# Patient Record
Sex: FEMALE | Race: WHITE | NOT HISPANIC OR LATINO
[De-identification: names, ages, dates, MRNs, and addresses within clinical notes are randomized per-mention and may not be internally consistent; named-entity substitution may affect disease eponyms.]

---

## 2018-10-02 ENCOUNTER — RX RENEWAL (OUTPATIENT)
Age: 83
End: 2018-10-02

## 2018-11-05 ENCOUNTER — RX RENEWAL (OUTPATIENT)
Age: 83
End: 2018-11-05

## 2018-11-12 ENCOUNTER — OTHER (OUTPATIENT)
Age: 83
End: 2018-11-12

## 2018-11-27 ENCOUNTER — RECORD ABSTRACTING (OUTPATIENT)
Age: 83
End: 2018-11-27

## 2018-11-27 ENCOUNTER — APPOINTMENT (OUTPATIENT)
Dept: ENDOCRINOLOGY | Facility: CLINIC | Age: 83
End: 2018-11-27
Payer: MEDICARE

## 2018-11-27 VITALS
DIASTOLIC BLOOD PRESSURE: 74 MMHG | OXYGEN SATURATION: 96 % | HEIGHT: 62 IN | HEART RATE: 79 BPM | BODY MASS INDEX: 36.8 KG/M2 | SYSTOLIC BLOOD PRESSURE: 120 MMHG | WEIGHT: 200 LBS

## 2018-11-27 DIAGNOSIS — Z78.9 OTHER SPECIFIED HEALTH STATUS: ICD-10-CM

## 2018-11-27 DIAGNOSIS — Z87.39 PERSONAL HISTORY OF OTHER DISEASES OF THE MUSCULOSKELETAL SYSTEM AND CONNECTIVE TISSUE: ICD-10-CM

## 2018-11-27 DIAGNOSIS — Z86.79 PERSONAL HISTORY OF OTHER DISEASES OF THE CIRCULATORY SYSTEM: ICD-10-CM

## 2018-11-27 LAB — GLUCOSE BLDC GLUCOMTR-MCNC: 288

## 2018-11-27 PROCEDURE — 99214 OFFICE O/P EST MOD 30 MIN: CPT | Mod: 25

## 2018-11-27 PROCEDURE — 82962 GLUCOSE BLOOD TEST: CPT

## 2018-11-27 RX ORDER — CLOPIDOGREL BISULFATE 75 MG/1
75 TABLET, FILM COATED ORAL
Refills: 0 | Status: ACTIVE | COMMUNITY

## 2018-12-04 ENCOUNTER — MEDICATION RENEWAL (OUTPATIENT)
Age: 83
End: 2018-12-04

## 2019-01-07 ENCOUNTER — OTHER (OUTPATIENT)
Age: 84
End: 2019-01-07

## 2019-01-14 ENCOUNTER — APPOINTMENT (OUTPATIENT)
Dept: ENDOCRINOLOGY | Facility: CLINIC | Age: 84
End: 2019-01-14
Payer: MEDICARE

## 2019-01-14 ENCOUNTER — MEDICATION RENEWAL (OUTPATIENT)
Age: 84
End: 2019-01-14

## 2019-01-14 VITALS — SYSTOLIC BLOOD PRESSURE: 100 MMHG | HEART RATE: 85 BPM | DIASTOLIC BLOOD PRESSURE: 60 MMHG

## 2019-01-14 PROCEDURE — 95251 CONT GLUC MNTR ANALYSIS I&R: CPT

## 2019-01-14 PROCEDURE — 99215 OFFICE O/P EST HI 40 MIN: CPT | Mod: 25

## 2019-01-14 RX ORDER — METOPROLOL TARTRATE 25 MG/1
25 TABLET, FILM COATED ORAL
Refills: 0 | Status: DISCONTINUED | COMMUNITY
End: 2019-01-14

## 2019-01-14 RX ORDER — MIRTAZAPINE 15 MG/1
15 TABLET, FILM COATED ORAL
Refills: 0 | Status: DISCONTINUED | COMMUNITY
End: 2019-01-14

## 2019-01-14 RX ORDER — LEVOTHYROXINE SODIUM 0.05 MG/1
50 TABLET ORAL DAILY
Qty: 90 | Refills: 0 | Status: DISCONTINUED | COMMUNITY
Start: 2018-12-04 | End: 2019-01-14

## 2019-01-14 RX ORDER — ASPIRIN 81 MG
81 TABLET, DELAYED RELEASE (ENTERIC COATED) ORAL
Refills: 0 | Status: ACTIVE | COMMUNITY

## 2019-01-14 RX ORDER — DONEPEZIL HYDROCHLORIDE 5 MG/1
5 TABLET ORAL
Refills: 0 | Status: DISCONTINUED | COMMUNITY
End: 2019-01-14

## 2019-01-14 RX ORDER — CALCITRIOL 0.25 UG/1
0.25 CAPSULE, LIQUID FILLED ORAL
Refills: 0 | Status: ACTIVE | COMMUNITY

## 2019-01-14 RX ORDER — PRAVASTATIN SODIUM 20 MG/1
20 TABLET ORAL
Refills: 0 | Status: DISCONTINUED | COMMUNITY
End: 2019-01-14

## 2019-01-14 RX ORDER — LEVOTHYROXINE SODIUM 0.07 MG/1
75 TABLET ORAL DAILY
Qty: 90 | Refills: 1 | Status: DISCONTINUED | COMMUNITY
Start: 2018-12-04 | End: 2019-01-14

## 2019-01-14 RX ORDER — FENOFIBRATE 48 MG/1
48 TABLET ORAL
Refills: 0 | Status: DISCONTINUED | COMMUNITY
End: 2019-01-14

## 2019-01-14 RX ORDER — OXYBUTYNIN CHLORIDE 10 MG/1
10 TABLET, EXTENDED RELEASE ORAL
Refills: 0 | Status: DISCONTINUED | COMMUNITY
End: 2019-01-14

## 2019-01-14 RX ORDER — ALLOPURINOL 100 MG/1
100 TABLET ORAL
Refills: 0 | Status: DISCONTINUED | COMMUNITY
End: 2019-01-14

## 2019-01-14 RX ORDER — HYDROXYZINE HYDROCHLORIDE 50 MG/1
50 TABLET ORAL
Refills: 0 | Status: ACTIVE | COMMUNITY

## 2019-01-22 ENCOUNTER — RECORD ABSTRACTING (OUTPATIENT)
Age: 84
End: 2019-01-22

## 2019-01-22 RX ORDER — METOPROLOL TARTRATE 25 MG/1
25 TABLET, FILM COATED ORAL
Refills: 0 | Status: ACTIVE | COMMUNITY

## 2019-01-25 ENCOUNTER — RX RENEWAL (OUTPATIENT)
Age: 84
End: 2019-01-25

## 2019-02-01 ENCOUNTER — RX RENEWAL (OUTPATIENT)
Age: 84
End: 2019-02-01

## 2019-02-04 ENCOUNTER — APPOINTMENT (OUTPATIENT)
Dept: ENDOCRINOLOGY | Facility: CLINIC | Age: 84
End: 2019-02-04
Payer: MEDICARE

## 2019-02-04 PROCEDURE — 97803 MED NUTRITION INDIV SUBSEQ: CPT

## 2019-02-20 ENCOUNTER — APPOINTMENT (OUTPATIENT)
Dept: ENDOCRINOLOGY | Facility: CLINIC | Age: 84
End: 2019-02-20
Payer: MEDICARE

## 2019-02-20 DIAGNOSIS — G30.9 ALZHEIMER'S DISEASE, UNSPECIFIED: ICD-10-CM

## 2019-02-20 DIAGNOSIS — F02.80 ALZHEIMER'S DISEASE, UNSPECIFIED: ICD-10-CM

## 2019-02-20 PROCEDURE — 36415 COLL VENOUS BLD VENIPUNCTURE: CPT

## 2019-02-26 ENCOUNTER — APPOINTMENT (OUTPATIENT)
Dept: ENDOCRINOLOGY | Facility: CLINIC | Age: 84
End: 2019-02-26

## 2019-03-01 LAB
ALBUMIN SERPL ELPH-MCNC: 4 G/DL
ALP BLD-CCNC: 63 U/L
ALT SERPL-CCNC: 13 U/L
ANION GAP SERPL CALC-SCNC: 17 MMOL/L
AST SERPL-CCNC: 10 U/L
BASOPHILS # BLD AUTO: 0.06 K/UL
BASOPHILS NFR BLD AUTO: 0.8 %
BILIRUB SERPL-MCNC: 0.3 MG/DL
BUN SERPL-MCNC: 74 MG/DL
CALCIUM SERPL-MCNC: 10.2 MG/DL
CHLORIDE SERPL-SCNC: 102 MMOL/L
CHOLEST SERPL-MCNC: 153 MG/DL
CHOLEST/HDLC SERPL: 4.1 RATIO
CO2 SERPL-SCNC: 26 MMOL/L
CREAT SERPL-MCNC: 2.43 MG/DL
EOSINOPHIL # BLD AUTO: 0.35 K/UL
EOSINOPHIL NFR BLD AUTO: 4.5 %
GLUCOSE SERPL-MCNC: 112 MG/DL
HBA1C MFR BLD HPLC: 10 %
HCT VFR BLD CALC: 43 %
HDLC SERPL-MCNC: 37 MG/DL
HGB BLD-MCNC: 13.3 G/DL
IMM GRANULOCYTES NFR BLD AUTO: 0.5 %
LDLC SERPL CALC-MCNC: 83 MG/DL
LYMPHOCYTES # BLD AUTO: 1.74 K/UL
LYMPHOCYTES NFR BLD AUTO: 22.5 %
MAN DIFF?: NORMAL
MCHC RBC-ENTMCNC: 29.7 PG
MCHC RBC-ENTMCNC: 30.9 GM/DL
MCV RBC AUTO: 96 FL
MONOCYTES # BLD AUTO: 0.85 K/UL
MONOCYTES NFR BLD AUTO: 11 %
NEUTROPHILS # BLD AUTO: 4.7 K/UL
NEUTROPHILS NFR BLD AUTO: 60.7 %
PLATELET # BLD AUTO: 256 K/UL
POTASSIUM SERPL-SCNC: 5.4 MMOL/L
PROT SERPL-MCNC: 6.1 G/DL
RBC # BLD: 4.48 M/UL
RBC # FLD: 12.3 %
SODIUM SERPL-SCNC: 145 MMOL/L
TRIGL SERPL-MCNC: 164 MG/DL
TSH SERPL-ACNC: 3.84 UIU/ML
WBC # FLD AUTO: 7.74 K/UL

## 2019-03-14 ENCOUNTER — APPOINTMENT (OUTPATIENT)
Dept: ENDOCRINOLOGY | Facility: CLINIC | Age: 84
End: 2019-03-14
Payer: MEDICARE

## 2019-03-14 ENCOUNTER — LABORATORY RESULT (OUTPATIENT)
Age: 84
End: 2019-03-14

## 2019-03-14 PROCEDURE — 36415 COLL VENOUS BLD VENIPUNCTURE: CPT

## 2019-03-19 ENCOUNTER — RECORD ABSTRACTING (OUTPATIENT)
Age: 84
End: 2019-03-19

## 2019-03-20 ENCOUNTER — APPOINTMENT (OUTPATIENT)
Dept: ENDOCRINOLOGY | Facility: CLINIC | Age: 84
End: 2019-03-20
Payer: MEDICARE

## 2019-03-20 PROCEDURE — 97803 MED NUTRITION INDIV SUBSEQ: CPT

## 2019-04-08 ENCOUNTER — RX RENEWAL (OUTPATIENT)
Age: 84
End: 2019-04-08

## 2019-04-15 ENCOUNTER — MEDICATION RENEWAL (OUTPATIENT)
Age: 84
End: 2019-04-15

## 2019-04-15 RX ORDER — FLASH GLUCOSE SENSOR
KIT MISCELLANEOUS
Qty: 9 | Refills: 2 | Status: DISCONTINUED | COMMUNITY
Start: 2018-11-05 | End: 2019-04-15

## 2019-04-16 RX ORDER — FLASH GLUCOSE SENSOR
KIT MISCELLANEOUS
Qty: 1 | Refills: 0 | Status: ACTIVE | COMMUNITY
Start: 2019-02-04 | End: 1900-01-01

## 2019-04-17 ENCOUNTER — APPOINTMENT (OUTPATIENT)
Dept: ENDOCRINOLOGY | Facility: CLINIC | Age: 84
End: 2019-04-17
Payer: MEDICARE

## 2019-04-17 VITALS
WEIGHT: 200 LBS | DIASTOLIC BLOOD PRESSURE: 60 MMHG | BODY MASS INDEX: 36.8 KG/M2 | HEIGHT: 62 IN | HEART RATE: 61 BPM | SYSTOLIC BLOOD PRESSURE: 108 MMHG

## 2019-04-17 LAB — GLUCOSE BLDC GLUCOMTR-MCNC: 301

## 2019-04-17 PROCEDURE — 99214 OFFICE O/P EST MOD 30 MIN: CPT | Mod: 25

## 2019-04-17 PROCEDURE — 82962 GLUCOSE BLOOD TEST: CPT

## 2019-04-17 PROCEDURE — 95251 CONT GLUC MNTR ANALYSIS I&R: CPT

## 2019-04-17 RX ORDER — FLASH GLUCOSE SENSOR
KIT MISCELLANEOUS
Qty: 6 | Refills: 1 | Status: ACTIVE | COMMUNITY
Start: 2019-02-04 | End: 1900-01-01

## 2019-04-17 NOTE — PHYSICAL EXAM
[Alert] : alert [No Acute Distress] : no acute distress [Well Nourished] : well nourished [Well Developed] : well developed [Normal Sclera/Conjunctiva] : normal sclera/conjunctiva [EOMI] : extra ocular movement intact [Normal Rate and Effort] : normal respiratory rhythm and effort [Normal Rate] : heart rate was normal  [Clear to Auscultation] : lungs were clear to auscultation bilaterally [Normal S1, S2] : normal S1 and S2 [Normal Bowel Sounds] : normal bowel sounds [Not Tender] : non-tender [Soft] : abdomen soft [Not Distended] : not distended [Normal Reflexes] : deep tendon reflexes were 2+ and symmetric [No Tremors] : no tremors [Oriented x3] : oriented to person, place, and time [Normal Insight/Judgement] : insight and judgment were intact [Normal Affect] : the affect was normal [Normal Mood] : the mood was normal

## 2019-04-17 NOTE — DATA REVIEWED
[FreeTextEntry1] : Labs 11/20/18\par A1c 9.9\par TSH 3.64 FT4 1.38\par Cr 2.14\par Tg 194\par LDL chol 99\par \par Labs 1/2/19\par Cr 2.28, GFR 19\par B12 470\par LDL chol 96. HDL 37, Tg 168\par A1c 10.4%\par TSH 4.550\par 25 vit D 40.2\par \par \par \par

## 2019-04-17 NOTE — HISTORY OF PRESENT ILLNESS
[FreeTextEntry1] : T2DM since 1990's on insulin since 2008\par aide with pt, daughter moved out to [place], another son helps out around the house and helps with medications \par no longer going to day center b/c she has an aide\par \par Current DM/thyroid meds:\par pt self injects insulin\par Basaglar 46 units in morning\par \par Novolog Breakfast\par 151-200 14 units\par 201-250 16\par 251-300 18\par 301-350 20\par 351-400 22\par 401+ 24 units\par \par Novolog Lunch\par 151-200 10 units\par 201-250 14\par 251-300 16\par 301-350 18\par 351-400 20\par 401+ 22 units\par \par Novolog Dinner\par 151-200 14 units\par 201-250 16\par 251-300 18\par 301-350 20\par 351-400 22\par 401+ 24 units \par \par adherent with thyroid meds\par does not have med list with her\par \par SMBG: using Hamlet sensor\par CGMS downloaded and reviewed: Hamlet\par Average glucose: 208\par % HIGH: 69\par % TARGET: 31\par % LOW: 0\par \par Interpretation: persistant hyperglycemia, worse after meals\par \par Diet: aids cooks diabetic, good appetite\par 9 am breakfast - farina, 1-2 pm lunch, 5-6 pm dinner\par \par Exercise: none\par \par Complications:\par CKD - sees Dr Acosta\par no recent eye exam \par

## 2019-04-17 NOTE — REVIEW OF SYSTEMS
[Nocturia] : nocturia [Polyuria] : polyuria [Insomnia] : insomnia [Stress] : stress [Fatigue] : no fatigue [Recent Weight Gain (___ Lbs)] : no recent weight gain [Recent Weight Loss (___ Lbs)] : no recent weight loss [Blurry Vision] : no blurred vision [Visual Field Defect] : no visual field defect [Chest Pain] : no chest pain [Palpitations] : no palpitations [Shortness Of Breath] : no shortness of breath [Nausea] : no nausea [SOB on Exertion] : no shortness of breath during exertion [Constipation] : no constipation [Vomiting] : no vomiting was observed [Diarrhea] : no diarrhea [Abdominal Pain] : no abdominal pain [Pain/Numbness of Digits] : no pain/numbness of digits [Depression] : no depression [Anxiety] : no anxiety [Polydipsia] : no polydipsia

## 2019-07-18 ENCOUNTER — APPOINTMENT (OUTPATIENT)
Dept: ENDOCRINOLOGY | Facility: CLINIC | Age: 84
End: 2019-07-18
Payer: MEDICARE

## 2019-07-18 DIAGNOSIS — Z00.00 ENCOUNTER FOR GENERAL ADULT MEDICAL EXAMINATION W/OUT ABNORMAL FINDINGS: ICD-10-CM

## 2019-07-18 PROCEDURE — 36415 COLL VENOUS BLD VENIPUNCTURE: CPT

## 2019-07-24 LAB
ALBUMIN SERPL ELPH-MCNC: 4.3 G/DL
ALP BLD-CCNC: 51 U/L
ALT SERPL-CCNC: 14 U/L
ANION GAP SERPL CALC-SCNC: 14 MMOL/L
AST SERPL-CCNC: 15 U/L
BASOPHILS # BLD AUTO: 0.06 K/UL
BASOPHILS NFR BLD AUTO: 0.8 %
BILIRUB SERPL-MCNC: 0.3 MG/DL
BUN SERPL-MCNC: 73 MG/DL
CALCIUM SERPL-MCNC: 10.3 MG/DL
CHLORIDE SERPL-SCNC: 101 MMOL/L
CHOLEST SERPL-MCNC: 180 MG/DL
CHOLEST/HDLC SERPL: 5.3 RATIO
CO2 SERPL-SCNC: 30 MMOL/L
CREAT SERPL-MCNC: 2.55 MG/DL
EOSINOPHIL # BLD AUTO: 0.34 K/UL
EOSINOPHIL NFR BLD AUTO: 4.7 %
ESTIMATED AVERAGE GLUCOSE: 183 MG/DL
GLUCOSE SERPL-MCNC: 116 MG/DL
HBA1C MFR BLD HPLC: 8 %
HCT VFR BLD CALC: 46.3 %
HDLC SERPL-MCNC: 34 MG/DL
HGB BLD-MCNC: 14.2 G/DL
IMM GRANULOCYTES NFR BLD AUTO: 0.3 %
LDLC SERPL CALC-MCNC: 95 MG/DL
LYMPHOCYTES # BLD AUTO: 1.62 K/UL
LYMPHOCYTES NFR BLD AUTO: 22.2 %
MAN DIFF?: NORMAL
MCHC RBC-ENTMCNC: 28 PG
MCHC RBC-ENTMCNC: 30.7 GM/DL
MCV RBC AUTO: 91.1 FL
MONOCYTES # BLD AUTO: 0.78 K/UL
MONOCYTES NFR BLD AUTO: 10.7 %
NEUTROPHILS # BLD AUTO: 4.49 K/UL
NEUTROPHILS NFR BLD AUTO: 61.3 %
PLATELET # BLD AUTO: 306 K/UL
POTASSIUM SERPL-SCNC: 4.6 MMOL/L
PROT SERPL-MCNC: 6.7 G/DL
RBC # BLD: 5.08 M/UL
RBC # FLD: 13.3 %
SODIUM SERPL-SCNC: 145 MMOL/L
T4 FREE SERPL-MCNC: 1.3 NG/DL
TRIGL SERPL-MCNC: 253 MG/DL
TSH SERPL-ACNC: 7.11 UIU/ML
WBC # FLD AUTO: 7.31 K/UL

## 2019-07-30 ENCOUNTER — APPOINTMENT (OUTPATIENT)
Dept: ENDOCRINOLOGY | Facility: CLINIC | Age: 84
End: 2019-07-30
Payer: MEDICARE

## 2019-07-30 VITALS — DIASTOLIC BLOOD PRESSURE: 58 MMHG | HEART RATE: 71 BPM | HEIGHT: 62 IN | SYSTOLIC BLOOD PRESSURE: 110 MMHG

## 2019-07-30 LAB — GLUCOSE BLDC GLUCOMTR-MCNC: 186

## 2019-07-30 PROCEDURE — 82962 GLUCOSE BLOOD TEST: CPT

## 2019-07-30 PROCEDURE — 95251 CONT GLUC MNTR ANALYSIS I&R: CPT

## 2019-07-30 PROCEDURE — 99215 OFFICE O/P EST HI 40 MIN: CPT | Mod: 25

## 2019-08-13 ENCOUNTER — MEDICATION RENEWAL (OUTPATIENT)
Age: 84
End: 2019-08-13

## 2019-08-13 RX ORDER — METOPROLOL SUCCINATE 25 MG/1
25 TABLET, EXTENDED RELEASE ORAL
Refills: 0 | Status: DISCONTINUED | COMMUNITY
End: 2019-08-13

## 2019-08-13 RX ORDER — LEVOCETIRIZINE DIHYDROCHLORIDE 5 MG/1
5 TABLET ORAL
Refills: 0 | Status: DISCONTINUED | COMMUNITY
End: 2019-08-13

## 2019-08-13 RX ORDER — FENOFIBRATE 48 MG/1
48 TABLET ORAL
Refills: 0 | Status: ACTIVE | COMMUNITY

## 2019-08-13 RX ORDER — OMEGA-3/DHA/EPA/FISH OIL 300-1000MG
1000 CAPSULE ORAL
Refills: 0 | Status: ACTIVE | COMMUNITY
Start: 2019-08-13

## 2019-08-13 RX ORDER — PANTOPRAZOLE SODIUM 40 MG/1
40 GRANULE, DELAYED RELEASE ORAL
Refills: 0 | Status: DISCONTINUED | COMMUNITY
End: 2019-08-13

## 2019-08-13 RX ORDER — PRAVASTATIN SODIUM 20 MG/1
20 TABLET ORAL
Refills: 0 | Status: ACTIVE | COMMUNITY

## 2019-08-13 RX ORDER — L.ACID/L.CASEI/B.BIF/B.LON/FOS 2B CELL-50
CAPSULE ORAL
Refills: 0 | Status: ACTIVE | COMMUNITY
Start: 2019-08-13

## 2019-08-13 RX ORDER — PRAVASTATIN SODIUM 20 MG/1
20 TABLET ORAL
Refills: 0 | Status: DISCONTINUED | COMMUNITY
End: 2019-08-13

## 2019-08-13 RX ORDER — OXYBUTYNIN CHLORIDE 10 MG/1
10 TABLET, EXTENDED RELEASE ORAL
Refills: 0 | Status: DISCONTINUED | COMMUNITY
End: 2019-08-13

## 2019-08-13 RX ORDER — PSYLLIUM HUSK 0.52 G/1
0.52 CAPSULE ORAL
Refills: 0 | Status: ACTIVE | COMMUNITY
Start: 2019-08-13

## 2019-08-13 RX ORDER — ALLOPURINOL 100 MG/1
100 TABLET ORAL
Refills: 0 | Status: DISCONTINUED | COMMUNITY
End: 2019-08-13

## 2019-08-13 RX ORDER — SERTRALINE 25 MG/1
25 TABLET, FILM COATED ORAL
Refills: 0 | Status: ACTIVE | COMMUNITY
Start: 2019-08-13

## 2019-08-13 RX ORDER — PANTOPRAZOLE 40 MG/1
40 TABLET, DELAYED RELEASE ORAL
Refills: 0 | Status: ACTIVE | COMMUNITY
Start: 2019-08-13

## 2019-08-13 RX ORDER — CHOLECALCIFEROL (VITAMIN D3) 25 MCG
25 TABLET ORAL
Refills: 0 | Status: ACTIVE | COMMUNITY
Start: 2019-08-13

## 2019-08-13 RX ORDER — DONEPEZIL HYDROCHLORIDE 5 MG/1
5 TABLET ORAL
Refills: 0 | Status: DISCONTINUED | COMMUNITY
End: 2019-08-13

## 2019-08-13 RX ORDER — INSULIN GLARGINE 100 [IU]/ML
100 INJECTION, SOLUTION SUBCUTANEOUS
Refills: 0 | Status: DISCONTINUED | COMMUNITY
End: 2019-08-13

## 2019-09-16 ENCOUNTER — RX RENEWAL (OUTPATIENT)
Age: 84
End: 2019-09-16

## 2019-10-02 ENCOUNTER — RX RENEWAL (OUTPATIENT)
Age: 84
End: 2019-10-02

## 2019-10-18 LAB
HBA1C MFR BLD HPLC: 7.9
LDLC SERPL DIRECT ASSAY-MCNC: 79.9

## 2019-10-21 ENCOUNTER — APPOINTMENT (OUTPATIENT)
Dept: ENDOCRINOLOGY | Facility: CLINIC | Age: 84
End: 2019-10-21
Payer: MEDICARE

## 2019-10-21 VITALS
BODY MASS INDEX: 36.8 KG/M2 | HEART RATE: 64 BPM | SYSTOLIC BLOOD PRESSURE: 130 MMHG | WEIGHT: 200 LBS | DIASTOLIC BLOOD PRESSURE: 80 MMHG | OXYGEN SATURATION: 95 % | HEIGHT: 62 IN

## 2019-10-21 LAB — GLUCOSE BLDC GLUCOMTR-MCNC: 175

## 2019-10-21 PROCEDURE — 82962 GLUCOSE BLOOD TEST: CPT

## 2019-10-21 PROCEDURE — 95251 CONT GLUC MNTR ANALYSIS I&R: CPT

## 2019-10-21 PROCEDURE — 99214 OFFICE O/P EST MOD 30 MIN: CPT | Mod: 25

## 2019-10-21 NOTE — HISTORY OF PRESENT ILLNESS
[FreeTextEntry1] : Quality: Type 2 DM\par Severity: moderate \par Duration: since 1990s and on insulin since 2008 \par Onset: routine labs\par Modifying Factors: Better with insulin \par Associated Symptoms: neuropathy \par \par Current Regimen:\par Novolog before meals \par 120-200 14 units\par 201-250 16 units\par 301-350 20 units\par 351-400 22 units \par > 400 24 units \par Basaglar 45 units at HS\par \par - Levothyroxine 125 mcg daily - taking appropriately \par \par \par Self blood sugar monitoring: Hamlet Personal Sensor - average glucose 157, SD 54.6\par % high: 36\par % Target: 60\par % Low: 4 \par - some overnight lows \par \par exercise: physical therapy twice a walk, ambulates with walker \par \par Diet:\par B- cream of wheat \par L- burger\par D- varies \par Snacks - cake, canned fruit \par \par Date of last eye exam: 9/2019 (-) diabetic retinopathy\par Date of last foot exam: last week with podiatry \par Date of last flu vaccine: 2018\par Date of last Pneumovax: 2018

## 2019-10-21 NOTE — ASSESSMENT
[FreeTextEntry1] : 84 y/o female with Type 2 DM, HTN, HLD, and Hypothyroidism. Labs reviewed from 10/10/19 - , A1C 7.9%, BUN 71, cholesterol 168, LDL 79, TSH 2.3, and Free T4 1.32\par \par Plan: \par Type 2 DM: controlled - A1C at goal\par - decrease Basaglar 40 units at HS \par - continue Novolog scale \par - continue Hamlet sensor \par - repeat A1C in 3 months\par \par BUN elevated - repeat CMP\par - follow up with Nephrology \par \par HLD: controlled with statin and Fenofibrate \par \par Hypothyroidism: euthyroid clinically and biomedically \par - continue Levothyroxine 125 mcg daily\par - repeat TFTs in 3 months \par \par HTN: acceptable, continue BB and Lasix\par  \par Labs and follow up in 3 months.

## 2019-10-21 NOTE — PHYSICAL EXAM
[Alert] : alert [No Acute Distress] : no acute distress [Well Nourished] : well nourished [Normal Sclera/Conjunctiva] : normal sclera/conjunctiva [Well Developed] : well developed [EOMI] : extra ocular movement intact [Supple] : the neck was supple [No LAD] : no lymphadenopathy [Thyroid Not Enlarged] : the thyroid was not enlarged [Normal Rate and Effort] : normal respiratory rhythm and effort [No Accessory Muscle Use] : no accessory muscle use [Clear to Auscultation] : lungs were clear to auscultation bilaterally [Normal Rate] : heart rate was normal  [Normal S1, S2] : normal S1 and S2 [No Tremors] : no tremors [Regular Rhythm] : with a regular rhythm [Oriented x3] : oriented to person, place, and time [Normal Insight/Judgement] : insight and judgment were intact [de-identified] : Pt. examined in wheelchair  [Normal Mood] : the mood was normal [de-identified] : Pt. refused foot examination

## 2019-10-21 NOTE — REASON FOR VISIT
[Follow-Up: _____] : a [unfilled] follow-up visit [Other: _____] : [unfilled] [FreeTextEntry1] : Type 2 DM, HTN, HLD, and Hypothyroidism

## 2019-10-21 NOTE — REVIEW OF SYSTEMS
[Recent Weight Loss (___ Lbs)] : recent [unfilled] ~Ulb weight loss [Constipation] : constipation [Polyuria] : polyuria [Polydipsia] : polydipsia [Heat Intolerance] : heat intolerant [Fatigue] : no fatigue [Decreased Appetite] : appetite not decreased [Recent Weight Gain (___ Lbs)] : no recent weight gain [Visual Field Defect] : no visual field defect [Blurry Vision] : no blurred vision [Dysphagia] : no dysphagia [Dysphonia] : no dysphonia [Neck Pain] : no neck pain [Chest Pain] : no chest pain [Palpitations] : no palpitations [Diarrhea] : no diarrhea [Dysuria] : no dysuria [Headache] : no headaches [Tremors] : no tremors [Depression] : no depression [Anxiety] : no anxiety [Cold Intolerance] : cold tolerant [Easy Bruising] : no tendency for easy bruising [Swelling] : no swelling

## 2019-10-22 ENCOUNTER — RESULT REVIEW (OUTPATIENT)
Age: 84
End: 2019-10-22

## 2019-10-22 LAB
ALBUMIN SERPL ELPH-MCNC: 4.4 G/DL
ALP BLD-CCNC: 56 U/L
ALT SERPL-CCNC: 16 U/L
ANION GAP SERPL CALC-SCNC: 17 MMOL/L
AST SERPL-CCNC: 17 U/L
BILIRUB SERPL-MCNC: 0.2 MG/DL
BUN SERPL-MCNC: 87 MG/DL
CALCIUM SERPL-MCNC: 10.2 MG/DL
CHLORIDE SERPL-SCNC: 99 MMOL/L
CO2 SERPL-SCNC: 27 MMOL/L
CREAT SERPL-MCNC: 3.12 MG/DL
GLUCOSE SERPL-MCNC: 167 MG/DL
POTASSIUM SERPL-SCNC: 5.2 MMOL/L
PROT SERPL-MCNC: 6.4 G/DL
SODIUM SERPL-SCNC: 143 MMOL/L

## 2019-10-23 ENCOUNTER — RX RENEWAL (OUTPATIENT)
Age: 84
End: 2019-10-23

## 2019-11-01 ENCOUNTER — RX RENEWAL (OUTPATIENT)
Age: 84
End: 2019-11-01

## 2019-11-01 RX ORDER — LEVOTHYROXINE SODIUM 0.12 MG/1
125 TABLET ORAL
Qty: 90 | Refills: 1 | Status: ACTIVE | COMMUNITY
Start: 2019-11-01 | End: 1900-01-01

## 2019-12-02 ENCOUNTER — APPOINTMENT (OUTPATIENT)
Dept: ENDOCRINOLOGY | Facility: CLINIC | Age: 84
End: 2019-12-02

## 2019-12-04 ENCOUNTER — RX RENEWAL (OUTPATIENT)
Age: 84
End: 2019-12-04

## 2020-01-21 ENCOUNTER — APPOINTMENT (OUTPATIENT)
Dept: ENDOCRINOLOGY | Facility: CLINIC | Age: 85
End: 2020-01-21
Payer: MEDICARE

## 2020-01-21 VITALS
DIASTOLIC BLOOD PRESSURE: 74 MMHG | WEIGHT: 200 LBS | BODY MASS INDEX: 36.8 KG/M2 | HEART RATE: 64 BPM | HEIGHT: 62 IN | SYSTOLIC BLOOD PRESSURE: 124 MMHG

## 2020-01-21 LAB — GLUCOSE BLDC GLUCOMTR-MCNC: 146

## 2020-01-21 PROCEDURE — 99214 OFFICE O/P EST MOD 30 MIN: CPT | Mod: 25

## 2020-01-21 PROCEDURE — 95251 CONT GLUC MNTR ANALYSIS I&R: CPT

## 2020-01-21 PROCEDURE — 82962 GLUCOSE BLOOD TEST: CPT

## 2020-01-21 NOTE — REVIEW OF SYSTEMS
[Constipation] : constipation [Polyuria] : polyuria [Polydipsia] : polydipsia [Heat Intolerance] : heat intolerant [Incontinence] : incontinence [Pain/Numbness of Digits] : pain/numbness of digits [Fatigue] : no fatigue [Decreased Appetite] : appetite not decreased [Recent Weight Gain (___ Lbs)] : no recent weight gain [Recent Weight Loss (___ Lbs)] : no recent weight loss [Visual Field Defect] : no visual field defect [Blurry Vision] : no blurred vision [Dysphagia] : no dysphagia [Dysphonia] : no dysphonia [Neck Pain] : no neck pain [Chest Pain] : no chest pain [Palpitations] : no palpitations [Nausea] : no nausea [Vomiting] : no vomiting was observed [Diarrhea] : no diarrhea [Abdominal Pain] : no abdominal pain [Nocturia] : no nocturia [Headache] : no headaches [Tremors] : no tremors [Depression] : no depression [Anxiety] : no anxiety [Cold Intolerance] : cold tolerant [Easy Bruising] : no tendency for easy bruising [Swelling] : no swelling

## 2020-01-21 NOTE — PHYSICAL EXAM
[Alert] : alert [No Acute Distress] : no acute distress [Well Nourished] : well nourished [Normal Sclera/Conjunctiva] : normal sclera/conjunctiva [Well Developed] : well developed [EOMI] : extra ocular movement intact [Supple] : the neck was supple [No LAD] : no lymphadenopathy [Thyroid Not Enlarged] : the thyroid was not enlarged [Normal Rate and Effort] : normal respiratory rhythm and effort [Clear to Auscultation] : lungs were clear to auscultation bilaterally [Normal Rate] : heart rate was normal  [Normal S1, S2] : normal S1 and S2 [Regular Rhythm] : with a regular rhythm [No Tremors] : no tremors [Oriented x3] : oriented to person, place, and time [Normal Insight/Judgement] : insight and judgment were intact [Normal Mood] : the mood was normal [Foot Ulcers] : no foot ulcers [de-identified] : Pt. examined in wheelchair  [de-identified] : bilateral 2+ pitting edema up to shins [de-identified] : unable to palpate pedal pulses due to edema

## 2020-01-21 NOTE — REASON FOR VISIT
[Follow-Up: _____] : a [unfilled] follow-up visit [Other: _____] : [unfilled] [FreeTextEntry1] : worsening Type 2 DM, stable HyperTg, HLD, and Hypothyroidism

## 2020-01-21 NOTE — DATA REVIEWED
[FreeTextEntry1] : Labs 1/15/20\par urine microalb/Cr 72\par B12 429\par Cr 2.36, GFR 20\par A1c 8.5, Gluc 181\par FT4 0.93. TSH 1.850\par LDL 74\par Vit D 44.3

## 2020-01-21 NOTE — ASSESSMENT
[FreeTextEntry1] : Type 2 DM complicated by CKD - worsening control\par - pt to call office with insulin doses\par - Hamlet DL reviewed - good control overnight but post meal hyperglycemia - will need more prandial insulin once I have proper insulin doses\par - continue Hamlet sensor \par - repeat A1C in 3 months\par  \par HLD: controlled with statin \par HyperTg controlled with Fenofibrate \par Hypothyroidism: clinically and biomedically euthyroid\par - continue Levothyroxine 125 mcg daily\par 2+ pitting edema - pt advised to f/u PCP\par - repeat TFTs in 3 months \par \par

## 2020-01-21 NOTE — HISTORY OF PRESENT ILLNESS
[FreeTextEntry1] : recent URI s/p Abx\par \par Quality: Type 2 DM\par Severity: moderate \par Duration: since 1990s and on insulin since 2008 \par Onset: routine labs\par Modifying Factors: Better with insulin \par Associated Symptoms: neuropathy, ?recent eye exam. (+) CKD elevated Cr - following with Dr Acosta\par \par Current Regimen: pt is not sure of scale\par Novolog before meals \par 120-200 14 units\par 201-250 16 units\par 301-350 20 units\par 351-400 22 units \par > 400 24 units \par Basaglar ?40 or 45 units at HS\par \par Exercise: physical therapy twice a walk, ambulates with walker \par Diet:\par B- cream of wheat \par L- burger\par D- varies \par Snacks - cake, canned fruit \par \par SMBG - Hamlet\par CGM downloaded and reviewed\par Average glucose:168\par SD: 49\par % HIGH:39\par % TARGET:59\par % LOW:2\par \par Interpretation: overnight numbers okay, post BF and lunch and dinner hyperglycemia\par \par \par

## 2020-03-03 ENCOUNTER — APPOINTMENT (OUTPATIENT)
Dept: ENDOCRINOLOGY | Facility: CLINIC | Age: 85
End: 2020-03-03
Payer: MEDICARE

## 2020-03-03 PROCEDURE — 97803 MED NUTRITION INDIV SUBSEQ: CPT

## 2020-03-20 ENCOUNTER — RX RENEWAL (OUTPATIENT)
Age: 85
End: 2020-03-20

## 2020-03-25 ENCOUNTER — RX RENEWAL (OUTPATIENT)
Age: 85
End: 2020-03-25

## 2020-04-13 ENCOUNTER — NON-APPOINTMENT (OUTPATIENT)
Age: 85
End: 2020-04-13

## 2020-04-14 ENCOUNTER — APPOINTMENT (OUTPATIENT)
Dept: ENDOCRINOLOGY | Facility: CLINIC | Age: 85
End: 2020-04-14
Payer: MEDICARE

## 2020-04-14 PROCEDURE — 98967 PH1 ASSMT&MGMT NQHP 11-20: CPT | Mod: CR

## 2020-05-12 ENCOUNTER — APPOINTMENT (OUTPATIENT)
Dept: ENDOCRINOLOGY | Facility: CLINIC | Age: 85
End: 2020-05-12
Payer: MEDICARE

## 2020-05-12 PROCEDURE — 99442: CPT | Mod: CR

## 2020-05-12 NOTE — HISTORY OF PRESENT ILLNESS
[FreeTextEntry1] : Interval Hx: no issues\par \par Quality: Type 2 DM\par Severity: moderate \par Duration: since 1990s and on insulin since 2008 \par Onset: routine labs\par Modifying Factors: Better with insulin \par Associated Symptoms: neuropathy, ?recent eye exam. (+) CKD elevated Cr - following with Dr Acosta\par \par Current Regimen: pt is not sure of scale\par Novolog before meals \par 120 - 200 16 units\par 201 - 250 18 units\par 251 - 300 20 units\par 300 - 350 22 units\par 351- 402 24 units\par Over 401 26 units\par Basaglar 25 units HS\par \par Exercise: physical therapy twice a walk, ambulates with walker \par Diet: eats 3 meals daily\par \par SMBG - Hamlet, per pt FS <200, scanning 4x daily, occasional FS 59 at 2 pm 1x/week\par ===================================\par Hypothyroid on LT4 125 mcg daily; adherent. denies anterior neck pain, dysphagia or voice changes\par ===================================\par HyperTg on Feofibrate\par =================================\par HLD - on pravastatin 20 mg nightly\par \par \par

## 2020-05-12 NOTE — ASSESSMENT
[FreeTextEntry1] : Type 2 DM complicated by CKD - stable control by history, c/o afternoon lows at times\par - cont Basaglar 25 units\par cont Novolog scale : w Bf and Dinner, suggest -2 units for Lunch\par 120 - 200 16 units\par 201 - 250 18 units\par 251 - 300 20 units\par 300 - 350 22 units\par 351- 402 24 units\par Over 401 26 units\par - continue Hamlet sensor \par - repeat A1C in 3 months\par  \par HLD: controlled with statin\par  \par HyperTg : controlled with Fenofibrate\par  \par Hypothyroidism: clinically and biomedically euthyroid\par - continue Levothyroxine 125 mcg daily\par \par repeat labs 3 months\par \par \par

## 2020-05-12 NOTE — DATA REVIEWED
[FreeTextEntry1] : Labs 1/15/20\par urine microalb/Cr 72\par B12 429\par Cr 2.36, GFR 20\par A1c 8.5, Gluc 181\par FT4 0.93. TSH 1.850\par LDL 74\par Vit D 44.3\par \par Labs 5/6/20\par Gluc 185\par Cr 2.54, GFR 18\par A1c 7.1\par TSH 1.700\par LDL 81

## 2020-05-12 NOTE — REVIEW OF SYSTEMS
[Recent Weight Gain (___ Lbs)] : recent weight gain: [unfilled] lbs [Recent Weight Loss (___ Lbs)] : no recent weight loss [Blurred Vision] : no blurred vision [As Noted in HPI] : as noted in HPI [Chest Pain] : no chest pain [Shortness Of Breath] : no shortness of breath [Palpitations] : no palpitations [SOB on Exertion] : no shortness of breath on exertion [Constipation] : no constipation [Polyuria] : polyuria [Diarrhea] : no diarrhea [Abdominal Pain] : no abdominal pain [Nocturia] : no nocturia [Muscle Weakness] : muscle weakness [Pain/Numbness of Digits] : pain/numbness of digits [Polydipsia] : polydipsia

## 2020-05-12 NOTE — REASON FOR VISIT
[Verbal consent obtained from patient] : the patient, [unfilled] [Follow - Up] : a follow-up visit [Hypothyroidism] : hypothyroidism [DM Type 2] : DM Type 2 [Other___] : [unfilled]

## 2020-06-17 ENCOUNTER — RX RENEWAL (OUTPATIENT)
Age: 85
End: 2020-06-17

## 2020-12-18 ENCOUNTER — RX RENEWAL (OUTPATIENT)
Age: 85
End: 2020-12-18

## 2020-12-31 PROBLEM — G30.9 ALZHEIMER'S DEMENTIA: Status: ACTIVE | Noted: 2018-11-27

## 2021-03-01 ENCOUNTER — RX RENEWAL (OUTPATIENT)
Age: 86
End: 2021-03-01

## 2021-04-13 ENCOUNTER — APPOINTMENT (OUTPATIENT)
Dept: ENDOCRINOLOGY | Facility: CLINIC | Age: 86
End: 2021-04-13
Payer: MEDICARE

## 2021-04-13 ENCOUNTER — RESULT CHARGE (OUTPATIENT)
Age: 86
End: 2021-04-13

## 2021-04-13 VITALS
OXYGEN SATURATION: 99 % | BODY MASS INDEX: 36.8 KG/M2 | WEIGHT: 200 LBS | HEART RATE: 69 BPM | HEIGHT: 62 IN | DIASTOLIC BLOOD PRESSURE: 70 MMHG | SYSTOLIC BLOOD PRESSURE: 110 MMHG

## 2021-04-13 LAB — GLUCOSE BLDC GLUCOMTR-MCNC: 197

## 2021-04-13 PROCEDURE — 95251 CONT GLUC MNTR ANALYSIS I&R: CPT

## 2021-04-13 PROCEDURE — 99214 OFFICE O/P EST MOD 30 MIN: CPT | Mod: 25

## 2021-04-13 PROCEDURE — 82962 GLUCOSE BLOOD TEST: CPT

## 2021-04-13 NOTE — HISTORY OF PRESENT ILLNESS
[FreeTextEntry1] : Interval Hx: sacral pressure ulcer is healing, still seeing wound care and has visiting nurse 3x per week.\par \par Quality: Type 2 DM\par Severity: moderate \par Duration: since  and on insulin since  \par Onset: routine labs\par Modifying Factors: Better with insulin \par Associated Symptoms: \par kidney: (+) CKD elevated Cr - following with Dr Acosta, has appointment coming up\par eye exam scheduled for , left eye getting worse. unsure of history of diabetic retinopathy.\par feet: +neuropathy\par heart: no MI, stent, or CVA\par \par Current Regimen: pt is not sure of scale\par Novolog before meals, -2 units at lunch, dose set up by aide and then patient injects\par 120 - 200 16 units\par 201 - 250 18 units\par 251 - 300 20 units\par 300 - 350 22 units\par 351- 402 24 units\par Over 401 26 units\par Basaglar 25 units HS, given by her children\par \par Exercise: ambulates with walker, goes up and down the tanner 2-3x daily\par Diet: eats 3 meals daily\par weight: stable\par \par SMBG with Freestyle Hamlet, reviewed CGMS. Avg , CV 25.5% with 78% of values in range and 22% high. None low. \par BG spikes significantly after dinner. Mild spike after breakfast.\par Current B, berries with whipped cream, hard boiled egg, and coffee with sugar for breakfast 2.5 hours ago.\par ===================================\par Hypothyroid on LT4 125 mcg daily; takes appropriately\par Denies anterior neck pain, dysphagia or voice changes\par ===================================\par HyperTg on Feofibrate\par =================================\par HLD - on pravastatin 20 mg nightly\par

## 2021-04-13 NOTE — PHYSICAL EXAM
[Alert] : alert [Well Nourished] : well nourished [Obese] : obese [No Acute Distress] : no acute distress [Well Developed] : well developed [Normal Sclera/Conjunctiva] : normal sclera/conjunctiva [EOMI] : extra ocular movement intact [No Proptosis] : no proptosis [Thyroid Not Enlarged] : the thyroid was not enlarged [No Thyroid Nodules] : no palpable thyroid nodules [No Respiratory Distress] : no respiratory distress [No Accessory Muscle Use] : no accessory muscle use [Clear to Auscultation] : lungs were clear to auscultation bilaterally [Normal S1, S2] : normal S1 and S2 [Normal Rate] : heart rate was normal [Regular Rhythm] : with a regular rhythm [Normal Anterior Cervical Nodes] : no anterior cervical lymphadenopathy [Normal Posterior Cervical Nodes] : no posterior cervical lymphadenopathy [No Rash] : no rash [Oriented x3] : oriented to person, place, and time [Normal Affect] : the affect was normal [Normal Mood] : the mood was normal [Acanthosis Nigricans] : no acanthosis nigricans [de-identified] : B/L LE edema with venous stasis changes [de-identified] : in a wheelchair

## 2021-04-13 NOTE — REVIEW OF SYSTEMS
[Blurred Vision] : blurred vision [SOB on Exertion] : shortness of breath on exertion [Constipation] : constipation [Polyuria] : polyuria [Depression] : depression [Dysphagia] : no dysphagia [Neck Pain] : no neck pain [Dysphonia] : no dysphonia [Chest Pain] : no chest pain [Palpitations] : no palpitations [Shortness Of Breath] : no shortness of breath [Nausea] : no nausea [Abdominal Pain] : no abdominal pain [Vomiting] : no vomiting [Diarrhea] : no diarrhea [Tremors] : no tremors [Pain/Numbness of Digits] : no pain/numbness of digits [Anxiety] : no anxiety [Polydipsia] : no polydipsia

## 2021-04-13 NOTE — ASSESSMENT
[FreeTextEntry1] : 86 year old female with T2DM and associated CKD, also with hypothyroidism, hyperlipidemia/hypertriglyceridemia, and hypertension. Glycemic control is acceptable.\par \par 1. T2DM with associated CKD- A1c 7.2%, not a candidate for tight control given age and risk of hypoglycemia. \par -Continue Basaglar 25 units daily.\par -Continue Novolog sliding scale but increase by 2 units at dinner. Written instructions provided.\par -Watch carbohydrate intake at breakfast and dinner.\par -Continue SMBG with Hamlet.\par -Repeat A1c before next office visit in 2 months.\par -Glucose sensor necessity: This patient with diabetes performs four or more glucose checks per day utilizing a home blood glucose monitor. The patient is treated with insulin via three or more injections daily. This patient requires frequent adjustments to their insulin treatment on the basis of therapeutic continuous glucose monitoring results. In addition, the patient has been to our office for an evaluation of their diabetes control within the past six months. \par \par  2. Hyperlipidemia- LDL acceptable.\par -Continue statin and fenofibrate.\par -Low fat, low carb diet.\par \par 3. Hypothyroidism\par -Continue current dose of LT4.\par -Check TFTs with next labs.\par \par

## 2021-06-01 ENCOUNTER — APPOINTMENT (OUTPATIENT)
Dept: ENDOCRINOLOGY | Facility: CLINIC | Age: 86
End: 2021-06-01

## 2021-06-01 ENCOUNTER — RX RENEWAL (OUTPATIENT)
Age: 86
End: 2021-06-01

## 2021-06-22 ENCOUNTER — APPOINTMENT (OUTPATIENT)
Dept: ENDOCRINOLOGY | Facility: CLINIC | Age: 86
End: 2021-06-22
Payer: MEDICARE

## 2021-06-22 VITALS
OXYGEN SATURATION: 95 % | SYSTOLIC BLOOD PRESSURE: 110 MMHG | HEIGHT: 62 IN | WEIGHT: 203 LBS | DIASTOLIC BLOOD PRESSURE: 70 MMHG | HEART RATE: 82 BPM | BODY MASS INDEX: 37.36 KG/M2

## 2021-06-22 LAB — GLUCOSE BLDC GLUCOMTR-MCNC: 109

## 2021-06-22 PROCEDURE — 99214 OFFICE O/P EST MOD 30 MIN: CPT | Mod: 25

## 2021-06-22 PROCEDURE — 82962 GLUCOSE BLOOD TEST: CPT

## 2021-06-22 RX ORDER — FEBUXOSTAT 80 MG/1
80 TABLET ORAL
Refills: 0 | Status: DISCONTINUED | COMMUNITY
End: 2021-06-22

## 2021-06-22 RX ORDER — DOCUSATE SODIUM 100 MG/1
CAPSULE ORAL
Refills: 0 | Status: ACTIVE | COMMUNITY

## 2021-06-22 RX ORDER — FUROSEMIDE 40 MG/1
40 TABLET ORAL
Refills: 0 | Status: DISCONTINUED | COMMUNITY
End: 2021-06-22

## 2021-06-22 RX ORDER — FUROSEMIDE 20 MG/1
20 TABLET ORAL
Refills: 0 | Status: DISCONTINUED | COMMUNITY
Start: 2019-08-13 | End: 2021-06-22

## 2021-06-22 NOTE — PHYSICAL EXAM
[Alert] : alert [Well Nourished] : well nourished [Obese] : obese [No Acute Distress] : no acute distress [Well Developed] : well developed [Normal Sclera/Conjunctiva] : normal sclera/conjunctiva [EOMI] : extra ocular movement intact [No Proptosis] : no proptosis [Thyroid Not Enlarged] : the thyroid was not enlarged [No Thyroid Nodules] : no palpable thyroid nodules [No Respiratory Distress] : no respiratory distress [No Accessory Muscle Use] : no accessory muscle use [Clear to Auscultation] : lungs were clear to auscultation bilaterally [Normal S1, S2] : normal S1 and S2 [Normal Rate] : heart rate was normal [Regular Rhythm] : with a regular rhythm [Normal Anterior Cervical Nodes] : no anterior cervical lymphadenopathy [No Rash] : no rash [Oriented x3] : oriented to person, place, and time [Normal Affect] : the affect was normal [Normal Mood] : the mood was normal [Acanthosis Nigricans] : no acanthosis nigricans [de-identified] : B/L LE edema with venous stasis changes [de-identified] : in a wheelchair [de-identified] : Le shin ulcerations - no signs of infection

## 2021-06-22 NOTE — HISTORY OF PRESENT ILLNESS
[FreeTextEntry1] : Interval Hx: recently hospitalized for sacral decubitus ulcer, following w wound care and VNS comes every 3 days to change dressing\par \par Quality: Type 2 DM\par Severity: moderate \par Duration: since 1990s and on insulin since 2008 \par Onset: routine labs\par Modifying Factors: Better with insulin \par Associated Symptoms: \par kidney: (+) CKD elevated Cr - following with Dr Acosta, has appointment coming up\par unsure of history of diabetic retinopathy.\par feet: +neuropathy\par \par Current Regimen: \par Novolog before meals, -2 units at lunch, dose set up by aide and then patient injects\par 120 - 200 16 units\par 201 - 250 18 units\par 251 - 300 20 units\par 300 - 350 22 units\par 351- 402 24 units\par Over 401 26 units\par Basaglar 25 units HS, given by her children\par \par Exercise: ambulates with walker, goes up and down the tanner 2-3x daily\par Diet: eats 3 meals daily\par weight: stable\par \par SMBG: using Hamlet, per son FS okay <200's, increased 60-80's\par ===================================\par Hypothyroid on LT4 125 mcg daily; takes appropriately. Denies anterior neck pain, dysphagia or voice changes\par ===================================\par HyperTg on Fenofibrate 28 mg\par =================================\par HLD - on pravastatin 20 mg nightly\par

## 2021-06-22 NOTE — ASSESSMENT
[FreeTextEntry1] : 86 year old female with T2DM and associated CKD, also with hypothyroidism, hyperlipidemia/hypertriglyceridemia, and hypertension. \par \par 1. T2DM with associated CKD - son reports incresaed lows lately but no Hamlet today at visit\par -  not a candidate for tight control given age, decreased mobility and risk of hypoglycemia,  asked son to RTO w Hamlet reader for DL\par -Continue Basaglar 25 units daily.\par -Continue current Novolog insulin dosing\par -Continue SMBG with Hamlet,\par -Repeat A1c needed\par \par -Glucose sensor necessity: This patient with diabetes performs four or more glucose checks per day utilizing a home blood glucose monitor. The patient is treated with insulin via three or more injections daily. This patient requires frequent adjustments to their insulin treatment on the basis of therapeutic continuous glucose monitoring results. In addition, the patient has been to our office for an evaluation of their diabetes control within the past six months. \par \par  2. Hyperlipidemia\par -Continue statin and fenofibrate.\par -Low fat, low carb diet.\par \par 3. Hypothyroidism\par -Continue current dose of LT4.\par -Check TFTs\par \par - try and get labs from [facility] \par \par  [Insulin Self-Administration] : insulin self-administration [Injection Technique, Storage, Sharps Disposal] : injection technique, storage, and sharps disposal [FreeTextEntry2] : air shot prior to injections, rotate injection site, hold button for several seconds after full depression

## 2021-06-22 NOTE — REASON FOR VISIT
[Follow - Up] : a follow-up visit [DM Type 2] : DM Type 2 [Hypothyroidism] : hypothyroidism [Family Member] : family member

## 2021-06-25 ENCOUNTER — NON-APPOINTMENT (OUTPATIENT)
Age: 86
End: 2021-06-25

## 2021-08-28 ENCOUNTER — RX RENEWAL (OUTPATIENT)
Age: 86
End: 2021-08-28

## 2021-09-24 LAB
HBA1C MFR BLD HPLC: 6.1
LDLC SERPL DIRECT ASSAY-MCNC: 72
MICROALBUMIN/CREAT 24H UR-RTO: 203

## 2021-09-28 ENCOUNTER — APPOINTMENT (OUTPATIENT)
Dept: ENDOCRINOLOGY | Facility: CLINIC | Age: 86
End: 2021-09-28
Payer: MEDICARE

## 2021-09-28 VITALS
HEIGHT: 62 IN | BODY MASS INDEX: 37.36 KG/M2 | DIASTOLIC BLOOD PRESSURE: 70 MMHG | HEART RATE: 61 BPM | WEIGHT: 203 LBS | SYSTOLIC BLOOD PRESSURE: 120 MMHG

## 2021-09-28 LAB — GLUCOSE BLDC GLUCOMTR-MCNC: 111

## 2021-09-28 PROCEDURE — 82962 GLUCOSE BLOOD TEST: CPT

## 2021-09-28 PROCEDURE — 95251 CONT GLUC MNTR ANALYSIS I&R: CPT

## 2021-09-28 PROCEDURE — 99214 OFFICE O/P EST MOD 30 MIN: CPT | Mod: 25

## 2021-09-28 RX ORDER — LACTULOSE 10 G/15ML
10 SOLUTION ORAL
Qty: 2700 | Refills: 0 | Status: ACTIVE | COMMUNITY
Start: 2021-07-20

## 2021-09-28 RX ORDER — SENNOSIDES 8.6 MG
8.6 TABLET ORAL
Qty: 30 | Refills: 0 | Status: ACTIVE | COMMUNITY
Start: 2021-08-23

## 2021-09-28 NOTE — HISTORY OF PRESENT ILLNESS
[FreeTextEntry1] : Interval Hx: chronic sacral decubitus ulcer, also with constipation\par \par Quality: Type 2 DM\par Severity: moderate \par Duration: since 1990s and on insulin since 2008 \par Onset: routine labs\par Modifying Factors: Better with insulin \par Associated Symptoms: \par kidney: (+) CKD elevated Cr - following with Dr Acosta, has appointment coming up\par unsure of history of diabetic retinopathy.\par feet: +neuropathy\par \par Current Regimen: \par Novolog before meals, -2 units at lunch, dose set up by aide and then patient injects\par 120 - 200 16 units\par 201 - 250 18 units\par 251 - 300 20 units\par 300 - 350 22 units\par 351- 402 24 units\par Over 401 26 units\par Basaglar 20 units HS, given by her children\par \par Exercise: ambulates with walker, goes up and down the tanner 2-3x daily\par Diet: eats 3 meals daily\par weight: stable\par \par SMBG: using Hamlet,\par CGM downloaded and reviewed: Hamlet\par Average glucose: 121\par % time CGM active: 86%\par Glucose variability (target <36%): 27\par \par % VERY HIGH (>250): 0\par % HIGH (181-250): 6\par % TARGET (): 91\par % LOW (54-69): 3\par % VERY LOW (<54): 0\par \par Interpretation: good control, some hypoglycemia early evening, glucoses tend to be low overnight\par \par ===================================\par Hypothyroid on LT4 125 mcg daily; takes appropriately. Denies anterior neck pain, dysphagia or voice changes\par ===================================\par HyperTg on Fenofibrate 28 mg\par =================================\par HLD - on pravastatin 20 mg nightly\par

## 2021-09-28 NOTE — DATA REVIEWED
[FreeTextEntry1] : Labs 3/13/21\par Cr 2.25, GFR 21\par Tg 203, HDL 33, LDL 74\par A1c 7.2\par \par labs 9/22/21\par urine alb/Cr 203\par LDL 72, HDL 34, Tg 156\par TSH 3.75, Ft4 1.2\par Cr 1.73, GFR 30\par A1c 6.1\par normal Hb

## 2021-09-28 NOTE — PHYSICAL EXAM
[Alert] : alert [Well Nourished] : well nourished [Obese] : obese [No Acute Distress] : no acute distress [Well Developed] : well developed [Normal Sclera/Conjunctiva] : normal sclera/conjunctiva [EOMI] : extra ocular movement intact [No Proptosis] : no proptosis [Thyroid Not Enlarged] : the thyroid was not enlarged [No Thyroid Nodules] : no palpable thyroid nodules [No Respiratory Distress] : no respiratory distress [No Accessory Muscle Use] : no accessory muscle use [Clear to Auscultation] : lungs were clear to auscultation bilaterally [Normal S1, S2] : normal S1 and S2 [Normal Rate] : heart rate was normal [Regular Rhythm] : with a regular rhythm [Normal Anterior Cervical Nodes] : no anterior cervical lymphadenopathy [No Rash] : no rash [Oriented x3] : oriented to person, place, and time [Normal Affect] : the affect was normal [Normal Mood] : the mood was normal [Acanthosis Nigricans] : no acanthosis nigricans [de-identified] : B/L LE edema with venous stasis changes [de-identified] : in a wheelchair [de-identified] : Le shin ulcerations - no signs of infection

## 2021-09-28 NOTE — ASSESSMENT
[Insulin Self-Administration] : insulin self-administration [Injection Technique, Storage, Sharps Disposal] : injection technique, storage, and sharps disposal [FreeTextEntry1] : 86 year old female with T2DM and associated CKD, also with hypothyroidism, hyperlipidemia/hypertriglyceridemia, and hypertension. \par \par 1. T2DM with associated CKD - Hamlet reviewed - fasting lows ,post prandial spike ep w BF and dinner\par -  not a candidate for tight control given age, decreased mobility and risk of hypoglycemia\par -decrease Basaglar 15 units daily.\par -increase Novolog BF and DInner\par 100 - 200 18 units\par 201 - 250 20 units\par 251 - 300 22 units\par 300 - 350 24 units\par 351- 402 26 units\par Over 401 28 units\par \par  Lunch\par 120 - 200 14 units\par 201 - 250 16 units\par 251 - 300 18 units\par 300 - 350 20 units\par 351- 402 22 units\par Over 401 24 units\par -Continue SMBG with Hamlet,\par -Repeat A1c 3 months\par - f/u retina\par - Hamlet DL in 1 month (son will being in reader)\par \par -Glucose sensor necessity: This patient with diabetes performs four or more glucose checks per day utilizing a home blood glucose monitor. The patient is treated with insulin via three or more injections daily. This patient requires frequent adjustments to their insulin treatment on the basis of therapeutic continuous glucose monitoring results. In addition, the patient has been to our office for an evaluation of their diabetes control within the past six months. \par \par  2. Hyperlipidemia\par -Continue statin and fenofibrate.\par -Low fat, low carb diet.\par \par 3. Hypothyroidism - euthyroid\par -Continue current dose of LT4.\par \par  [FreeTextEntry2] : air shot prior to injections, rotate injection site, hold button for several seconds after full depression

## 2021-11-09 ENCOUNTER — NON-APPOINTMENT (OUTPATIENT)
Age: 86
End: 2021-11-09

## 2021-11-09 ENCOUNTER — APPOINTMENT (OUTPATIENT)
Dept: OPHTHALMOLOGY | Facility: CLINIC | Age: 86
End: 2021-11-09
Payer: MEDICARE

## 2021-11-09 PROCEDURE — 92004 COMPRE OPH EXAM NEW PT 1/>: CPT

## 2021-11-18 ENCOUNTER — RX RENEWAL (OUTPATIENT)
Age: 86
End: 2021-11-18

## 2021-11-30 ENCOUNTER — RX RENEWAL (OUTPATIENT)
Age: 86
End: 2021-11-30

## 2021-12-28 ENCOUNTER — APPOINTMENT (OUTPATIENT)
Dept: ENDOCRINOLOGY | Facility: CLINIC | Age: 86
End: 2021-12-28
Payer: MEDICARE

## 2021-12-28 PROCEDURE — 99443: CPT | Mod: 95

## 2021-12-28 RX ORDER — LACTULOSE 10 G/15 ML
10 SOLUTION, ORAL ORAL
Refills: 0 | Status: DISCONTINUED | COMMUNITY
End: 2021-12-28

## 2021-12-28 RX ORDER — BETAMETHASONE DIPROPIONATE 0.5 MG/G
0.05 CREAM TOPICAL
Qty: 60 | Refills: 0 | Status: ACTIVE | COMMUNITY
Start: 2021-11-28

## 2021-12-28 RX ORDER — FUROSEMIDE 40 MG/1
40 TABLET ORAL DAILY
Refills: 0 | Status: ACTIVE | COMMUNITY

## 2021-12-28 NOTE — REASON FOR VISIT
[Home] : at home, [unfilled] , at the time of the visit. [Medical Office: ([facility])___] : at the medical office located in  [Verbal consent obtained from patient] : the patient, [unfilled] [Follow - Up] : a follow-up visit [DM Type 2] : DM Type 2 [Hypothyroidism] : hypothyroidism [Family Member] : family member

## 2021-12-28 NOTE — REVIEW OF SYSTEMS
[Recent Weight Gain (___ Lbs)] : no recent weight gain [Recent Weight Loss (___ Lbs)] : no recent weight loss [Blurred Vision] : blurred vision [Dysphagia] : no dysphagia [Neck Pain] : no neck pain [Dysphonia] : no dysphonia [Chest Pain] : no chest pain [Palpitations] : no palpitations [Shortness Of Breath] : no shortness of breath [Nausea] : no nausea [Constipation] : constipation [Abdominal Pain] : no abdominal pain [Vomiting] : no vomiting [Diarrhea] : no diarrhea [Polyuria] : polyuria [Tremors] : no tremors [Pain/Numbness of Digits] : no pain/numbness of digits [Depression] : depression [Anxiety] : no anxiety [Polydipsia] : no polydipsia

## 2021-12-28 NOTE — HISTORY OF PRESENT ILLNESS
[FreeTextEntry1] : Interval Hx: chronic sacral decubitus ulcer improving per pt, to see Derm\par \par Quality: Type 2 DM\par Severity: moderate \par Duration: since 1990s and on insulin since 2008 \par Onset: routine labs\par Modifying Factors: Better with insulin \par Associated Symptoms: \par kidney: (+) CKD elevated Cr - following with Dr Acosta\par unsure of history of diabetic retinopathy.\par feet: +neuropathy\par \par Current Regimen: \par Novolog BF and DInner\par 100 - 200 18 units\par 201 - 250 20 units\par 251 - 300 22 units\par 300 - 350 24 units\par 351- 402 26 units\par Over 401 28 units\par \par  Lunch\par 120 - 200 14 units\par 201 - 250 16 units\par 251 - 300 18 units\par 300 - 350 20 units\par 351- 402 22 units\par Over 401 24 units\par Basaglar 15 units HS, given by her children\par \par Exercise: ambulates with walker, goes up and down the tanner 2-3x daily\par Diet: eats 3 meals daily, light lunch - soup, afternoon snack\par weight: stable\par \par SMBG: using Hamlet, Glucoses <200, afternoon lows 60's. sometimes bedtime lows lows\par ===================================\par Hypothyroid on LT4 125 mcg daily; takes appropriately. Denies anterior neck pain, dysphagia or voice changes\par ===================================\par HyperTg on Fenofibrate 28 mg\par =================================\par HLD - on pravastatin 20 mg nightly\par

## 2021-12-28 NOTE — ASSESSMENT
[FreeTextEntry1] : 87 year old female with T2DM and associated CKD, also with hypothyroidism, hyperlipidemia/hypertriglyceridemia, and hypertension. \par \par 1. T2DM with associated CKD - reports afternoon lows\par -  not a candidate for tight control given age, decreased mobility and risk of hypoglycemia\par -cont Basaglar 15 units daily.\par -cont  Novolog BF and DInner\par 100 - 200 18 units\par 201 - 250 20 units\par 251 - 300 22 units\par 300 - 350 24 units\par 351- 402 26 units\par Over 401 28 units\par \par  - decrease Lunch insulin \par 120 - 200 8 units\par 201 - 250 10 units\par 251 - 300 12 units\par 301 - 350 14 units\par 351- 400 16 units\par Over 401 18  units\par -Continue SMBG with Hamlet, RTO for Hamlet DL in 1 week (son will bring in reader)\par -Repeat A1c needed, labs from  [place]\par - f/u retina\par \par \par -Glucose sensor necessity: Glucose Sensor Necessity:  This patient with diabetes (dx:  E11.65)  This patient is currently using a Hamlet continuous glucose monitor.  The patient is treated with insulin via 3 or more injections daily  This patient requires frequent adjustments to their insulin treatment on the basis of therapeutic continuous glucose monitoring results. (or This patient is adjusting their blood glucose based on data from the continuous glucose monitor.) In addition, the patient has been to our office for an evaluation of their diabetes control within the past 6 months.\par \par \par  2. Hyperlipidemia\par -Continue statin and fenofibrate.\par -Low fat, low carb diet.\par - updated labs needed\par \par 3. Hypothyroidism - clinically euthyroid \par -Continue current dose of LT4.\par - updated labs needed\par \par

## 2022-01-01 ENCOUNTER — APPOINTMENT (OUTPATIENT)
Dept: ENDOCRINOLOGY | Facility: CLINIC | Age: 87
End: 2022-01-01
Payer: MEDICARE

## 2022-01-01 ENCOUNTER — RX RENEWAL (OUTPATIENT)
Age: 87
End: 2022-01-01

## 2022-01-01 ENCOUNTER — APPOINTMENT (OUTPATIENT)
Dept: ENDOCRINOLOGY | Facility: CLINIC | Age: 87
End: 2022-01-01

## 2022-01-01 ENCOUNTER — NON-APPOINTMENT (OUTPATIENT)
Age: 87
End: 2022-01-01

## 2022-01-01 ENCOUNTER — APPOINTMENT (OUTPATIENT)
Dept: OPHTHALMOLOGY | Facility: CLINIC | Age: 87
End: 2022-01-01

## 2022-01-01 VITALS
BODY MASS INDEX: 36.8 KG/M2 | HEIGHT: 62 IN | WEIGHT: 200 LBS | SYSTOLIC BLOOD PRESSURE: 126 MMHG | HEART RATE: 68 BPM | DIASTOLIC BLOOD PRESSURE: 60 MMHG

## 2022-01-01 DIAGNOSIS — E11.22 TYPE 2 DIABETES MELLITUS WITH DIABETIC CHRONIC KIDNEY DISEASE: ICD-10-CM

## 2022-01-01 DIAGNOSIS — Z79.4 LONG TERM (CURRENT) USE OF INSULIN: ICD-10-CM

## 2022-01-01 DIAGNOSIS — I10 ESSENTIAL (PRIMARY) HYPERTENSION: ICD-10-CM

## 2022-01-01 DIAGNOSIS — E11.65 TYPE 2 DIABETES MELLITUS WITH HYPERGLYCEMIA: ICD-10-CM

## 2022-01-01 DIAGNOSIS — E03.8 OTHER SPECIFIED HYPOTHYROIDISM: ICD-10-CM

## 2022-01-01 DIAGNOSIS — E78.1 PURE HYPERGLYCERIDEMIA: ICD-10-CM

## 2022-01-01 DIAGNOSIS — E78.5 HYPERLIPIDEMIA, UNSPECIFIED: ICD-10-CM

## 2022-01-01 LAB
HBA1C MFR BLD HPLC: 6.5
HBA1C MFR BLD HPLC: 6.6
LDLC SERPL DIRECT ASSAY-MCNC: 67
LDLC SERPL DIRECT ASSAY-MCNC: 94
MICROALBUMIN/CREAT 24H UR-RTO: 155
MICROALBUMIN/CREAT UR-RTO: 108

## 2022-01-01 PROCEDURE — 99214 OFFICE O/P EST MOD 30 MIN: CPT

## 2022-01-01 PROCEDURE — 95251 CONT GLUC MNTR ANALYSIS I&R: CPT

## 2022-01-01 PROCEDURE — 99214 OFFICE O/P EST MOD 30 MIN: CPT | Mod: 25

## 2022-01-01 PROCEDURE — 99443: CPT | Mod: 95

## 2022-01-01 RX ORDER — MIRTAZAPINE 45 MG/1
45 TABLET, FILM COATED ORAL
Refills: 0 | Status: DISCONTINUED | COMMUNITY
End: 2022-01-01

## 2022-01-01 RX ORDER — TRAZODONE HYDROCHLORIDE 50 MG/1
50 TABLET ORAL
Qty: 90 | Refills: 0 | Status: ACTIVE | COMMUNITY
Start: 2022-01-01

## 2022-01-01 RX ORDER — PEN NEEDLE, DIABETIC 29 G X1/2"
31G X 8 MM NEEDLE, DISPOSABLE MISCELLANEOUS
Qty: 4 | Refills: 1 | Status: ACTIVE | COMMUNITY
Start: 2018-10-02 | End: 1900-01-01

## 2022-01-01 RX ORDER — INSULIN ASPART 100 [IU]/ML
100 INJECTION, SOLUTION INTRAVENOUS; SUBCUTANEOUS
Qty: 5 | Refills: 1 | Status: ACTIVE | COMMUNITY
Start: 2019-02-01 | End: 1900-01-01

## 2022-01-01 RX ORDER — INSULIN GLARGINE 100 [IU]/ML
100 INJECTION, SOLUTION SUBCUTANEOUS
Qty: 2 | Refills: 1 | Status: ACTIVE | COMMUNITY
Start: 2019-04-08 | End: 1900-01-01

## 2022-01-01 RX ORDER — FLASH GLUCOSE SENSOR
KIT MISCELLANEOUS
Qty: 6 | Refills: 3 | Status: ACTIVE | COMMUNITY
Start: 2019-09-16 | End: 1900-01-01

## 2022-01-01 RX ORDER — OXYCODONE 5 MG/1
5 TABLET ORAL
Refills: 0 | Status: DISCONTINUED | COMMUNITY
End: 2022-01-01

## 2022-01-19 ENCOUNTER — NON-APPOINTMENT (OUTPATIENT)
Age: 87
End: 2022-01-19

## 2022-01-21 ENCOUNTER — NON-APPOINTMENT (OUTPATIENT)
Age: 87
End: 2022-01-21

## 2022-03-10 PROBLEM — I10 ESSENTIAL HYPERTENSION: Status: ACTIVE | Noted: 2018-11-27

## 2022-03-10 NOTE — HISTORY OF PRESENT ILLNESS
[FreeTextEntry1] : Interval Hx: chronic sacral decubitus ulcer improving per pt, asymptomatic \par chronic puritis \par feels she is gaining weight \par \par Quality: Type 2 DM\par Severity: moderate \par Duration: since 1990s and on insulin since 2008 \par Onset: routine labs\par Modifying Factors: Better with insulin \par Associated Symptoms: \par kidney: (+) CKD elevated Cr - following with Dr Acosta\par unsure of history of diabetic retinopathy.\par feet: +neuropathy\par \par Current Regimen: \par Novolog BF and DInner\par 100 - 200 18 units\par 201 - 250 20 units\par 251 - 300 22 units\par 300 - 350 24 units\par 351- 402 26 units\par Over 401 28 units\par \par  Lunch\par 120 - 200 8 units\par 201 - 250 10 units\par 251 - 300 12 units\par 301 - 350 14 units\par 351- 400 16 units\par Over 401 18 units\par \par Basaglar 10 units HS, given by her children\par \par Exercise: ambulates with walker, goes up and down the tanner 2-3x daily\par Diet: eats 3 meals daily, light lunch - soup, afternoon snack\par weight: unable to get on scale today \par \par SMBG: using Hamlet\par Average glucose 151\par GMI 6.9%\par glucose variability 25.2%\par \par \par Very low 0%\par Low 0%\par In range 78%\par High 21%\par Very high 1%\par \par FS in office 130- hamlet scan\par \par HGA1C 6.5\par ===================================\par Hypothyroid on LT4 125 mcg daily; takes appropriately. Denies anterior neck pain, dysphagia or voice changes\par TSH 3.54, Free T4 1.2\par ===================================\par HyperTg on Fenofibrate 28 mg\par =================================\par HLD - on pravastatin 20 mg nightly\par Total cholesterol 160, HDL 44, Triglycerides 106, LDL 94\par  H

## 2022-03-10 NOTE — PHYSICAL EXAM
[Alert] : alert [Well Nourished] : well nourished [No Acute Distress] : no acute distress [Normal Sclera/Conjunctiva] : normal sclera/conjunctiva [Normal Hearing] : hearing was normal [Thyroid Not Enlarged] : the thyroid was not enlarged [No Accessory Muscle Use] : no accessory muscle use [Clear to Auscultation] : lungs were clear to auscultation bilaterally [Normal S1, S2] : normal S1 and S2 [Normal Rate] : heart rate was normal [de-identified] : in wheelchair

## 2022-03-10 NOTE — ASSESSMENT
[FreeTextEntry1] : 87 year old female with T2DM and associated CKD, also with hypothyroidism, hyperlipidemia/hypertriglyceridemia, and hypertension. \par \par 1. T2DM with associated CKD - reports afternoon lows\par - not a candidate for tight control given age, decreased mobility and risk of hypoglycemia\par -cont Basaglar 10 units daily.\par -cont Novolog dosing\par -Continue SMBG with Hamlet\par -Repeat A1c needed in 3 months, labs from [place]\par - f/u retina\par \par \par -Glucose sensor necessity: Glucose Sensor Necessity: This patient with diabetes (dx: E11.65) This patient is currently using a Hamlet continuous glucose monitor. The patient is treated with insulin via 3 or more injections daily This patient requires frequent adjustments to their insulin treatment on the basis of therapeutic continuous glucose monitoring results. (or This patient is adjusting their blood glucose based on data from the continuous glucose monitor.) In addition, the patient has been to our office for an evaluation of their diabetes control within the past 6 months.\par \par \par  2. Hyperlipidemia\par -Continue statin and fenofibrate.\par -Low fat, low carb diet.\par -LDL at goal\par \par 3. Hypothyroidism - clinically euthyroid \par -Continue current dose of LT4.\par -TFTs appropriate

## 2022-03-10 NOTE — REVIEW OF SYSTEMS
[Recent Weight Gain (___ Lbs)] : recent weight gain: [unfilled] lbs [Constipation] : constipation [Chest Pain] : no chest pain [Shortness Of Breath] : no shortness of breath [Diarrhea] : no diarrhea [Polyuria] : no polyuria [Polydipsia] : no polydipsia

## 2022-06-22 NOTE — REASON FOR VISIT
[Home] : at home, [unfilled] , at the time of the visit. [Medical Office: ([facility])___] : at the medical office located in  [Patient] : the patient [Follow - Up] : a follow-up visit [DM Type 2] : DM Type 2 [Hypothyroidism] : hypothyroidism [Family Member] : family member

## 2022-09-23 NOTE — PHYSICAL EXAM
[Alert] : alert [No Acute Distress] : no acute distress [EOMI] : extra ocular movement intact [Oriented x3] : oriented to person, place, and time [Normal Affect] : the affect was normal [Normal Insight/Judgement] : insight and judgment were intact [Normal Mood] : the mood was normal [de-identified] : (+) bilateral LE swelling

## 2022-09-23 NOTE — DATA REVIEWED
[FreeTextEntry1] : Labs 3/13/21\par Cr 2.25, GFR 21\par Tg 203, HDL 33, LDL 74\par A1c 7.2\par \par labs 9/22/21\par urine alb/Cr 203\par LDL 72, HDL 34, Tg 156\par TSH 3.75, Ft4 1.2\par Cr 1.73, GFR 30\par A1c 6.1\par normal Hb\par \par Labs 6/8/22\par urine alb/cr 108\par A1c 6.6\par vit D 43\par LDL 67, Tg 170\par B12>2000\par TSH 3.22, Ft4 1.2\par Cr 2.05, GFR 23

## 2022-09-23 NOTE — ASSESSMENT
[FreeTextEntry1] : 87 year old female with T2DM and associated CKD, also with hypothyroidism, hyperlipidemia/hypertriglyceridemia, and hypertension. \par \par 1. T2DM with associated CKD - good control by history\par -  not a candidate for tight control given age, decreased mobility and risk of hypoglycemia, risks of hypoglycemia discussed\par -cont Basaglar 10units daily.\par -cont  Novolog BF and DInner\par 100 - 200 18 units\par 201 - 250 20 units\par 251 - 300 22 units\par 300 - 350 24 units\par 351- 402 26 units\par Over 401 28 units\par \par  - cont Lunch insulin \par 120 - 200 8 units\par 201 - 250 10 units\par 251 - 300 12 units\par 301 - 350 14 units\par 351- 400 16 units\par Over 401 18  units\par -Continue SMBG with Hamlet, RTO for Hamlet DL in 1 week (son will bring in reader)\par -Repeat A1c 3 months, labs from  [place]\par - f/u retina\par \par \par -Glucose sensor necessity: Glucose Sensor Necessity:  This patient with diabetes (dx:  E11.65)  This patient is currently using a Hamlet continuous glucose monitor.  The patient is treated with insulin via 3 or more injections daily  This patient requires frequent adjustments to their insulin treatment on the basis of therapeutic continuous glucose monitoring results. (or This patient is adjusting their blood glucose based on data from the continuous glucose monitor.) In addition, the patient has been to our office for an evaluation of their diabetes control within the past 6 months.\par \par \par  2. Hyperlipidemia\par -Continue statin and fenofibrate.\par -Low fat, low carb diet.\par \par 3. Hypothyroidism -  euthyroid \par -Continue current dose of LT4.\par \par

## 2022-09-23 NOTE — HISTORY OF PRESENT ILLNESS
[FreeTextEntry1] : Interval Hx:leg swelling, swill w sacral ulcers, unable to walk and may need knee replacement surgery, eating less, (+) recent URI\par \par Quality: Type 2 DM\par Severity: moderate \par Duration: since 1990s and on insulin since 2008 \par Onset: routine labs\par \par Modifying Factors: Better with insulin \par Associated Symptoms: \par kidney: (+) CKD elevated Cr - following with Dr Acosta\par unsure of history of diabetic retinopathy.\par feet: +neuropathy\par \par Current Regimen: \par cont Novolog BF and DInner\par 100 - 200 18 units\par 201 - 250 20 units\par 251 - 300 22 units\par 300 - 350 24 units\par 351- 402 26 units\par Over 401 28 units\par \par  -  Lunch insulin \par 100 - 200 8 units\par 201 - 250 10 units\par 251 - 300 12 units\par 301 - 350 14 units\par 351- 400 16 units\par Over 401 18 units\par Basaglar 10 units HS, given by her children\par \par SMBG: using Hamlet CGM, per son numbers okay, lowest sugar 79\par =======================================\par Hypothyroid on LT4 125 mcg daily; takes appropriately. Denies anterior neck pain, dysphagia or voice changes\par ===================================\par HyperTg on Fenofibrate 28 mg\par =================================\par HLD - on pravastatin 20 mg nightly\par

## 2022-09-23 NOTE — REVIEW OF SYSTEMS
[Recent Weight Gain (___ Lbs)] : recent weight gain: [unfilled] lbs [Polyuria] : polyuria [Joint Pain] : joint pain [Blurred Vision] : no blurred vision [Chest Pain] : no chest pain [Shortness Of Breath] : no shortness of breath [Nausea] : no nausea [Abdominal Pain] : no abdominal pain [Cold Intolerance] : no cold intolerance [Heat Intolerance] : no heat intolerance

## 2022-10-12 PROBLEM — E11.65 TYPE 2 DIABETES MELLITUS WITH HYPERGLYCEMIA: Status: ACTIVE | Noted: 2018-10-02

## 2022-10-12 PROBLEM — E78.1 HYPERTRIGLYCERIDEMIA: Status: ACTIVE | Noted: 2019-01-14

## 2022-10-12 PROBLEM — E03.8 OTHER HYPOTHYROIDISM: Status: ACTIVE | Noted: 2018-11-12

## 2022-10-12 PROBLEM — Z79.4 LONG TERM (CURRENT) USE OF INSULIN: Status: ACTIVE | Noted: 2018-11-27

## 2022-10-12 PROBLEM — E11.22 TYPE 2 DIABETES MELLITUS WITH CHRONIC KIDNEY DISEASE: Status: ACTIVE | Noted: 2018-11-27

## 2022-10-12 PROBLEM — E78.5 HYPERLIPIDEMIA: Status: ACTIVE | Noted: 2018-11-12

## 2022-10-12 NOTE — DATA REVIEWED
[FreeTextEntry1] : Labs 3/13/21\par Cr 2.25, GFR 21\par Tg 203, HDL 33, LDL 74\par A1c 7.2\par \par labs 9/22/21\par urine alb/Cr 203\par LDL 72, HDL 34, Tg 156\par TSH 3.75, Ft4 1.2\par Cr 1.73, GFR 30\par A1c 6.1\par normal Hb\par \par Labs 6/8/22\par urine alb/cr 108\par A1c 6.6\par vit D 43\par LDL 67, Tg 170\par B12>2000\par TSH 3.22, Ft4 1.2\par Cr 2.05, GFR 23\par \par Labs 9/20/22\par LDL 91, HDL 36, Tg 174\par GFR 17, Cr 2.61\par A1c 7.1

## 2022-10-12 NOTE — REVIEW OF SYSTEMS
[Fatigue] : no fatigue [Chest Pain] : no chest pain [Shortness Of Breath] : no shortness of breath [Nausea] : no nausea [Abdominal Pain] : no abdominal pain [Polyuria] : no polyuria [Nocturia] : no nocturia [Polydipsia] : no polydipsia [de-identified] : does not sleep well

## 2022-10-12 NOTE — HISTORY OF PRESENT ILLNESS
[FreeTextEntry1] : Interval Hx:  no changes, issues with knee and needs knee replacement surgery, surgeon is reluctant to do surgery and waiting for repeat imaging\par \par Quality: Type 2 DM\par Severity: moderate \par Duration: since 1990s and on insulin since 2008 \par Onset: routine labs\par \par Modifying Factors: Better with insulin \par Associated Symptoms: \par kidney: (+) CKD elevated Cr - following with Dr Acosta\par unsure of history of diabetic retinopathy.\par feet: +neuropathy\par \par Current Regimen: \par cont Novolog BF and DInner\par 100 - 200 18 units\par 201 - 250 20 units\par 251 - 300 22 units\par 300 - 350 24 units\par 351- 402 26 units\par Over 401 28 units\par \par  -  Lunch insulin \par 100 - 200 8 units\par 201 - 250 10 units\par 251 - 300 12 units\par 301 - 350 14 units\par 351- 400 16 units\par Over 401 18 units\par Basaglar 5 units HS, given by her children\par \par SMBG: using Hamlet CGM \par CGM downloaded and reviewed: Hamlet\par Average glucose: 149\par % time CGM active:  86\par Glucose variability (target <36%): 32\par \par % VERY HIGH (>250): 1\par % HIGH (181-250): 27\par % TARGET (): 67\par % LOW (54-69): 4\par % VERY LOW (<54): 1\par \par Interpretation: post prandial hyperglycemia, overnight lows\par \par =======================================\par Hypothyroid on LT4 125 mcg daily; takes appropriately. Denies anterior neck pain, dysphagia or voice changes\par ===================================\par HyperTg on Fenofibrate 28 mg\par =================================\par HLD - on pravastatin 20 mg nightly\par

## 2022-10-12 NOTE — ASSESSMENT
[FreeTextEntry1] : 87 year old female with T2DM and associated CKD, also with hypothyroidism, hyperlipidemia/hypertriglyceridemia, and hypertension. \par \par 1. T2DM with associated CKD - Hamlet reviewed - overnight lows, post prandial hyperglycemia but worsening CKD on labs. A1c 7.1 - acceptable, will allow TSh to rise up to 8%\par -  not a candidate for tight control given age, decreased mobility and risk of hypoglycemia, risks of hypoglycemia discussed\par - if glucoses remain suboptimal despite below changes, consider GLP1 agonist (pt will need eye exam first)\par -stop Basaglar\par -cont  Novolog BF \par 100 - 200 18 units\par 201 - 250 20 units\par 251 - 300 22 units\par 300 - 350 24 units\par 351- 402 26 units\par Over 401 28 units\par \par  - cont Lunch insulin \par 120 - 200 8 units\par 201 - 250 10 units\par 251 - 300 12 units\par 301 - 350 14 units\par 351- 400 16 units\par Over 401 18  units\par \par - increase Novolog Dinner\par 100 - 200 20 units\par 201 - 250 22 units\par 251 - 300 24 units\par 300 - 350 26 units\par 351- 402 28 units\par Over 401 30 units\par -Continue SMBG with JANE Mcnulty for Hamlet DL in 2 week (son will bring in reader)\par -Repeat A1c 3 months\par - f/u retina\par \par -Glucose sensor necessity: Glucose Sensor Necessity:  This patient with diabetes (dx:  E11.65)  This patient is currently using a Hamlet continuous glucose monitor.  The patient is treated with insulin via 3 or more injections daily  This patient requires frequent adjustments to their insulin treatment on the basis of therapeutic continuous glucose monitoring results. (or This patient is adjusting their blood glucose based on data from the continuous glucose monitor.) In addition, the patient has been to our office for an evaluation of their diabetes control within the past 6 months.\par \par \par  2. Hyperlipidemia\par -Continue statin and fenofibrate.\par -Low fat, low carb diet.\par \par 3. Hypothyroidism - no recent TFTs\par - labs needed TSH, FT4 ([place] home draw)\par -Continue current dose of LT4.\par \par

## 2023-01-01 ENCOUNTER — TRANSCRIPTION ENCOUNTER (OUTPATIENT)
Age: 88
End: 2023-01-01

## 2023-01-01 ENCOUNTER — INPATIENT (INPATIENT)
Facility: HOSPITAL | Age: 88
LOS: 6 days | DRG: 853 | End: 2023-02-04
Attending: INTERNAL MEDICINE | Admitting: INTERNAL MEDICINE
Payer: MEDICARE

## 2023-01-01 VITALS
TEMPERATURE: 98 F | RESPIRATION RATE: 19 BRPM | HEART RATE: 74 BPM | DIASTOLIC BLOOD PRESSURE: 75 MMHG | OXYGEN SATURATION: 95 % | SYSTOLIC BLOOD PRESSURE: 136 MMHG

## 2023-01-01 VITALS
SYSTOLIC BLOOD PRESSURE: 70 MMHG | TEMPERATURE: 99 F | WEIGHT: 210.1 LBS | OXYGEN SATURATION: 93 % | HEIGHT: 62 IN | RESPIRATION RATE: 24 BRPM | DIASTOLIC BLOOD PRESSURE: 44 MMHG | HEART RATE: 64 BPM

## 2023-01-01 DIAGNOSIS — D62 ACUTE POSTHEMORRHAGIC ANEMIA: ICD-10-CM

## 2023-01-01 DIAGNOSIS — R11.0 NAUSEA: ICD-10-CM

## 2023-01-01 DIAGNOSIS — I21.4 NON-ST ELEVATION (NSTEMI) MYOCARDIAL INFARCTION: ICD-10-CM

## 2023-01-01 DIAGNOSIS — K92.2 GASTROINTESTINAL HEMORRHAGE, UNSPECIFIED: ICD-10-CM

## 2023-01-01 LAB
-  AMIKACIN: SIGNIFICANT CHANGE UP
-  AMPICILLIN/SULBACTAM: SIGNIFICANT CHANGE UP
-  AMPICILLIN: SIGNIFICANT CHANGE UP
-  AMPICILLIN: SIGNIFICANT CHANGE UP
-  AZTREONAM: SIGNIFICANT CHANGE UP
-  CEFAZOLIN: SIGNIFICANT CHANGE UP
-  CEFEPIME: SIGNIFICANT CHANGE UP
-  CEFOXITIN: SIGNIFICANT CHANGE UP
-  CEFTRIAXONE: SIGNIFICANT CHANGE UP
-  CIPROFLOXACIN: SIGNIFICANT CHANGE UP
-  COAGULASE NEGATIVE STAPHYLOCOCCUS: SIGNIFICANT CHANGE UP
-  ERTAPENEM: SIGNIFICANT CHANGE UP
-  GENTAMICIN SYNERGY: SIGNIFICANT CHANGE UP
-  GENTAMICIN: SIGNIFICANT CHANGE UP
-  IMIPENEM: SIGNIFICANT CHANGE UP
-  K. PNEUMONIAE GROUP: SIGNIFICANT CHANGE UP
-  LEVOFLOXACIN: SIGNIFICANT CHANGE UP
-  MEROPENEM: SIGNIFICANT CHANGE UP
-  PIPERACILLIN/TAZOBACTAM: SIGNIFICANT CHANGE UP
-  STREPTOMYCIN SYNERGY: SIGNIFICANT CHANGE UP
-  TOBRAMYCIN: SIGNIFICANT CHANGE UP
-  TRIMETHOPRIM/SULFAMETHOXAZOLE: SIGNIFICANT CHANGE UP
-  VANCOMYCIN: SIGNIFICANT CHANGE UP
24R-OH-CALCIDIOL SERPL-MCNC: 31.2 NG/ML — SIGNIFICANT CHANGE UP (ref 30–80)
A1C WITH ESTIMATED AVERAGE GLUCOSE RESULT: 6 % — HIGH (ref 4–5.6)
ABO RH CONFIRMATION: SIGNIFICANT CHANGE UP
ALBUMIN SERPL ELPH-MCNC: 2.6 G/DL — LOW (ref 3.3–5.2)
ALBUMIN SERPL ELPH-MCNC: 2.8 G/DL — LOW (ref 3.3–5.2)
ALBUMIN SERPL ELPH-MCNC: 2.8 G/DL — LOW (ref 3.3–5.2)
ALBUMIN SERPL ELPH-MCNC: 3 G/DL — LOW (ref 3.3–5.2)
ALP SERPL-CCNC: 50 U/L — SIGNIFICANT CHANGE UP (ref 40–120)
ALP SERPL-CCNC: 56 U/L — SIGNIFICANT CHANGE UP (ref 40–120)
ALP SERPL-CCNC: 60 U/L — SIGNIFICANT CHANGE UP (ref 40–120)
ALP SERPL-CCNC: 70 U/L — SIGNIFICANT CHANGE UP (ref 40–120)
ALT FLD-CCNC: 10 U/L — SIGNIFICANT CHANGE UP
ALT FLD-CCNC: 17 U/L — SIGNIFICANT CHANGE UP
ALT FLD-CCNC: 17 U/L — SIGNIFICANT CHANGE UP
ALT FLD-CCNC: 24 U/L — SIGNIFICANT CHANGE UP
ANION GAP SERPL CALC-SCNC: 10 MMOL/L — SIGNIFICANT CHANGE UP (ref 5–17)
ANION GAP SERPL CALC-SCNC: 11 MMOL/L — SIGNIFICANT CHANGE UP (ref 5–17)
ANION GAP SERPL CALC-SCNC: 13 MMOL/L — SIGNIFICANT CHANGE UP (ref 5–17)
ANION GAP SERPL CALC-SCNC: 14 MMOL/L — SIGNIFICANT CHANGE UP (ref 5–17)
ANION GAP SERPL CALC-SCNC: 17 MMOL/L — SIGNIFICANT CHANGE UP (ref 5–17)
ANION GAP SERPL CALC-SCNC: 7 MMOL/L — SIGNIFICANT CHANGE UP (ref 5–17)
ANION GAP SERPL CALC-SCNC: 9 MMOL/L — SIGNIFICANT CHANGE UP (ref 5–17)
ANION GAP SERPL CALC-SCNC: 9 MMOL/L — SIGNIFICANT CHANGE UP (ref 5–17)
ANISOCYTOSIS BLD QL: SLIGHT — SIGNIFICANT CHANGE UP
ANISOCYTOSIS BLD QL: SLIGHT — SIGNIFICANT CHANGE UP
APPEARANCE UR: ABNORMAL
APPEARANCE UR: CLEAR — SIGNIFICANT CHANGE UP
APTT BLD: 24.2 SEC — LOW (ref 27.5–35.5)
AST SERPL-CCNC: 18 U/L — SIGNIFICANT CHANGE UP
AST SERPL-CCNC: 20 U/L — SIGNIFICANT CHANGE UP
AST SERPL-CCNC: 22 U/L — SIGNIFICANT CHANGE UP
AST SERPL-CCNC: 52 U/L — HIGH
BACTERIA # UR AUTO: ABNORMAL
BACTERIA # UR AUTO: ABNORMAL
BASE EXCESS BLDV CALC-SCNC: -2.9 MMOL/L — LOW (ref -2–3)
BASE EXCESS BLDV CALC-SCNC: -3.6 MMOL/L — LOW (ref -2–3)
BASO STIPL BLD QL SMEAR: PRESENT — SIGNIFICANT CHANGE UP
BASOPHILS # BLD AUTO: 0.02 K/UL — SIGNIFICANT CHANGE UP (ref 0–0.2)
BASOPHILS # BLD AUTO: 0.03 K/UL — SIGNIFICANT CHANGE UP (ref 0–0.2)
BASOPHILS # BLD AUTO: 0.11 K/UL — SIGNIFICANT CHANGE UP (ref 0–0.2)
BASOPHILS NFR BLD AUTO: 0.3 % — SIGNIFICANT CHANGE UP (ref 0–2)
BASOPHILS NFR BLD AUTO: 0.3 % — SIGNIFICANT CHANGE UP (ref 0–2)
BASOPHILS NFR BLD AUTO: 0.9 % — SIGNIFICANT CHANGE UP (ref 0–2)
BILIRUB SERPL-MCNC: 0.2 MG/DL — LOW (ref 0.4–2)
BILIRUB SERPL-MCNC: 0.2 MG/DL — LOW (ref 0.4–2)
BILIRUB SERPL-MCNC: 0.4 MG/DL — SIGNIFICANT CHANGE UP (ref 0.4–2)
BILIRUB SERPL-MCNC: <0.2 MG/DL — LOW (ref 0.4–2)
BILIRUB UR-MCNC: NEGATIVE — SIGNIFICANT CHANGE UP
BILIRUB UR-MCNC: NEGATIVE — SIGNIFICANT CHANGE UP
BLD GP AB SCN SERPL QL: SIGNIFICANT CHANGE UP
BLD GP AB SCN SERPL QL: SIGNIFICANT CHANGE UP
BUN SERPL-MCNC: 59.7 MG/DL — HIGH (ref 8–20)
BUN SERPL-MCNC: 60 MG/DL — HIGH (ref 8–20)
BUN SERPL-MCNC: 63.2 MG/DL — HIGH (ref 8–20)
BUN SERPL-MCNC: 65.1 MG/DL — HIGH (ref 8–20)
BUN SERPL-MCNC: 67.6 MG/DL — HIGH (ref 8–20)
BUN SERPL-MCNC: 68.8 MG/DL — HIGH (ref 8–20)
BUN SERPL-MCNC: 76.7 MG/DL — HIGH (ref 8–20)
BUN SERPL-MCNC: 82.6 MG/DL — HIGH (ref 8–20)
BUN SERPL-MCNC: 87.8 MG/DL — HIGH (ref 8–20)
BUN SERPL-MCNC: 89.1 MG/DL — HIGH (ref 8–20)
BUN SERPL-MCNC: 90.8 MG/DL — HIGH (ref 8–20)
BUN SERPL-MCNC: 92 MG/DL — HIGH (ref 8–20)
BUN SERPL-MCNC: 92.2 MG/DL — HIGH (ref 8–20)
BUN SERPL-MCNC: 93.8 MG/DL — HIGH (ref 8–20)
BURR CELLS BLD QL SMEAR: PRESENT — SIGNIFICANT CHANGE UP
CA-I SERPL-SCNC: 1.18 MMOL/L — SIGNIFICANT CHANGE UP (ref 1.15–1.33)
CA-I SERPL-SCNC: 1.27 MMOL/L — SIGNIFICANT CHANGE UP (ref 1.15–1.33)
CALCIUM SERPL-MCNC: 8.1 MG/DL — LOW (ref 8.4–10.5)
CALCIUM SERPL-MCNC: 8.1 MG/DL — LOW (ref 8.4–10.5)
CALCIUM SERPL-MCNC: 8.2 MG/DL — LOW (ref 8.4–10.5)
CALCIUM SERPL-MCNC: 8.4 MG/DL — SIGNIFICANT CHANGE UP (ref 8.4–10.5)
CALCIUM SERPL-MCNC: 8.4 MG/DL — SIGNIFICANT CHANGE UP (ref 8.4–10.5)
CALCIUM SERPL-MCNC: 8.6 MG/DL — SIGNIFICANT CHANGE UP (ref 8.4–10.5)
CALCIUM SERPL-MCNC: 8.7 MG/DL — SIGNIFICANT CHANGE UP (ref 8.4–10.5)
CALCIUM SERPL-MCNC: 8.7 MG/DL — SIGNIFICANT CHANGE UP (ref 8.4–10.5)
CALCIUM SERPL-MCNC: 8.8 MG/DL — SIGNIFICANT CHANGE UP (ref 8.4–10.5)
CALCIUM SERPL-MCNC: 9.1 MG/DL — SIGNIFICANT CHANGE UP (ref 8.4–10.5)
CALCIUM SERPL-MCNC: 9.2 MG/DL — SIGNIFICANT CHANGE UP (ref 8.4–10.5)
CALCIUM SERPL-MCNC: 9.6 MG/DL — SIGNIFICANT CHANGE UP (ref 8.4–10.5)
CALCIUM SERPL-MCNC: 9.7 MG/DL — SIGNIFICANT CHANGE UP (ref 8.4–10.5)
CHLORIDE BLDV-SCNC: 106 MMOL/L — SIGNIFICANT CHANGE UP (ref 96–108)
CHLORIDE BLDV-SCNC: 107 MMOL/L — SIGNIFICANT CHANGE UP (ref 96–108)
CHLORIDE SERPL-SCNC: 103 MMOL/L — SIGNIFICANT CHANGE UP (ref 96–108)
CHLORIDE SERPL-SCNC: 104 MMOL/L — SIGNIFICANT CHANGE UP (ref 96–108)
CHLORIDE SERPL-SCNC: 105 MMOL/L — SIGNIFICANT CHANGE UP (ref 96–108)
CHLORIDE SERPL-SCNC: 106 MMOL/L — SIGNIFICANT CHANGE UP (ref 96–108)
CHLORIDE SERPL-SCNC: 107 MMOL/L — SIGNIFICANT CHANGE UP (ref 96–108)
CHLORIDE SERPL-SCNC: 111 MMOL/L — HIGH (ref 96–108)
CO2 SERPL-SCNC: 21 MMOL/L — LOW (ref 22–29)
CO2 SERPL-SCNC: 22 MMOL/L — SIGNIFICANT CHANGE UP (ref 22–29)
CO2 SERPL-SCNC: 22 MMOL/L — SIGNIFICANT CHANGE UP (ref 22–29)
CO2 SERPL-SCNC: 23 MMOL/L — SIGNIFICANT CHANGE UP (ref 22–29)
CO2 SERPL-SCNC: 25 MMOL/L — SIGNIFICANT CHANGE UP (ref 22–29)
CO2 SERPL-SCNC: 26 MMOL/L — SIGNIFICANT CHANGE UP (ref 22–29)
COLOR SPEC: YELLOW — SIGNIFICANT CHANGE UP
COLOR SPEC: YELLOW — SIGNIFICANT CHANGE UP
COMMENT - URINE: SIGNIFICANT CHANGE UP
CREAT SERPL-MCNC: 2.25 MG/DL — HIGH (ref 0.5–1.3)
CREAT SERPL-MCNC: 2.33 MG/DL — HIGH (ref 0.5–1.3)
CREAT SERPL-MCNC: 2.35 MG/DL — HIGH (ref 0.5–1.3)
CREAT SERPL-MCNC: 2.37 MG/DL — HIGH (ref 0.5–1.3)
CREAT SERPL-MCNC: 2.38 MG/DL — HIGH (ref 0.5–1.3)
CREAT SERPL-MCNC: 2.46 MG/DL — HIGH (ref 0.5–1.3)
CREAT SERPL-MCNC: 2.47 MG/DL — HIGH (ref 0.5–1.3)
CREAT SERPL-MCNC: 2.48 MG/DL — HIGH (ref 0.5–1.3)
CREAT SERPL-MCNC: 2.52 MG/DL — HIGH (ref 0.5–1.3)
CREAT SERPL-MCNC: 2.53 MG/DL — HIGH (ref 0.5–1.3)
CREAT SERPL-MCNC: 2.62 MG/DL — HIGH (ref 0.5–1.3)
CREAT SERPL-MCNC: 2.63 MG/DL — HIGH (ref 0.5–1.3)
CREAT SERPL-MCNC: 2.68 MG/DL — HIGH (ref 0.5–1.3)
CREAT SERPL-MCNC: 2.93 MG/DL — HIGH (ref 0.5–1.3)
CULTURE RESULTS: SIGNIFICANT CHANGE UP
DIFF PNL FLD: ABNORMAL
DIFF PNL FLD: ABNORMAL
E FAECALIS DNA BLD POS QL NAA+NON-PROBE: SIGNIFICANT CHANGE UP
EGFR: 15 ML/MIN/1.73M2 — LOW
EGFR: 17 ML/MIN/1.73M2 — LOW
EGFR: 18 ML/MIN/1.73M2 — LOW
EGFR: 19 ML/MIN/1.73M2 — LOW
EGFR: 20 ML/MIN/1.73M2 — LOW
EGFR: 20 ML/MIN/1.73M2 — LOW
EOSINOPHIL # BLD AUTO: 0.22 K/UL — SIGNIFICANT CHANGE UP (ref 0–0.5)
EOSINOPHIL # BLD AUTO: 0.33 K/UL — SIGNIFICANT CHANGE UP (ref 0–0.5)
EOSINOPHIL # BLD AUTO: 0.52 K/UL — HIGH (ref 0–0.5)
EOSINOPHIL NFR BLD AUTO: 2.5 % — SIGNIFICANT CHANGE UP (ref 0–6)
EOSINOPHIL NFR BLD AUTO: 2.7 % — SIGNIFICANT CHANGE UP (ref 0–6)
EOSINOPHIL NFR BLD AUTO: 8.3 % — HIGH (ref 0–6)
EPI CELLS # UR: SIGNIFICANT CHANGE UP
EPI CELLS # UR: SIGNIFICANT CHANGE UP
ESTIMATED AVERAGE GLUCOSE: 126 MG/DL — HIGH (ref 68–114)
FERRITIN SERPL-MCNC: 461 NG/ML — HIGH (ref 15–150)
GAS PNL BLDV: 135 MMOL/L — LOW (ref 136–145)
GAS PNL BLDV: 136 MMOL/L — SIGNIFICANT CHANGE UP (ref 136–145)
GAS PNL BLDV: SIGNIFICANT CHANGE UP
GIANT PLATELETS BLD QL SMEAR: PRESENT — SIGNIFICANT CHANGE UP
GIANT PLATELETS BLD QL SMEAR: PRESENT — SIGNIFICANT CHANGE UP
GLUCOSE BLDC GLUCOMTR-MCNC: 106 MG/DL — HIGH (ref 70–99)
GLUCOSE BLDC GLUCOMTR-MCNC: 108 MG/DL — HIGH (ref 70–99)
GLUCOSE BLDC GLUCOMTR-MCNC: 113 MG/DL — HIGH (ref 70–99)
GLUCOSE BLDC GLUCOMTR-MCNC: 118 MG/DL — HIGH (ref 70–99)
GLUCOSE BLDC GLUCOMTR-MCNC: 119 MG/DL — HIGH (ref 70–99)
GLUCOSE BLDC GLUCOMTR-MCNC: 123 MG/DL — HIGH (ref 70–99)
GLUCOSE BLDC GLUCOMTR-MCNC: 124 MG/DL — HIGH (ref 70–99)
GLUCOSE BLDC GLUCOMTR-MCNC: 125 MG/DL — HIGH (ref 70–99)
GLUCOSE BLDC GLUCOMTR-MCNC: 130 MG/DL — HIGH (ref 70–99)
GLUCOSE BLDC GLUCOMTR-MCNC: 137 MG/DL — HIGH (ref 70–99)
GLUCOSE BLDC GLUCOMTR-MCNC: 148 MG/DL — HIGH (ref 70–99)
GLUCOSE BLDC GLUCOMTR-MCNC: 153 MG/DL — HIGH (ref 70–99)
GLUCOSE BLDC GLUCOMTR-MCNC: 155 MG/DL — HIGH (ref 70–99)
GLUCOSE BLDC GLUCOMTR-MCNC: 156 MG/DL — HIGH (ref 70–99)
GLUCOSE BLDC GLUCOMTR-MCNC: 167 MG/DL — HIGH (ref 70–99)
GLUCOSE BLDC GLUCOMTR-MCNC: 176 MG/DL — HIGH (ref 70–99)
GLUCOSE BLDC GLUCOMTR-MCNC: 179 MG/DL — HIGH (ref 70–99)
GLUCOSE BLDC GLUCOMTR-MCNC: 181 MG/DL — HIGH (ref 70–99)
GLUCOSE BLDC GLUCOMTR-MCNC: 183 MG/DL — HIGH (ref 70–99)
GLUCOSE BLDC GLUCOMTR-MCNC: 188 MG/DL — HIGH (ref 70–99)
GLUCOSE BLDC GLUCOMTR-MCNC: 189 MG/DL — HIGH (ref 70–99)
GLUCOSE BLDC GLUCOMTR-MCNC: 189 MG/DL — HIGH (ref 70–99)
GLUCOSE BLDC GLUCOMTR-MCNC: 190 MG/DL — HIGH (ref 70–99)
GLUCOSE BLDC GLUCOMTR-MCNC: 193 MG/DL — HIGH (ref 70–99)
GLUCOSE BLDC GLUCOMTR-MCNC: 203 MG/DL — HIGH (ref 70–99)
GLUCOSE BLDC GLUCOMTR-MCNC: 213 MG/DL — HIGH (ref 70–99)
GLUCOSE BLDC GLUCOMTR-MCNC: 226 MG/DL — HIGH (ref 70–99)
GLUCOSE BLDC GLUCOMTR-MCNC: 227 MG/DL — HIGH (ref 70–99)
GLUCOSE BLDC GLUCOMTR-MCNC: 244 MG/DL — HIGH (ref 70–99)
GLUCOSE BLDC GLUCOMTR-MCNC: 271 MG/DL — HIGH (ref 70–99)
GLUCOSE BLDC GLUCOMTR-MCNC: 78 MG/DL — SIGNIFICANT CHANGE UP (ref 70–99)
GLUCOSE BLDC GLUCOMTR-MCNC: 80 MG/DL — SIGNIFICANT CHANGE UP (ref 70–99)
GLUCOSE BLDC GLUCOMTR-MCNC: 81 MG/DL — SIGNIFICANT CHANGE UP (ref 70–99)
GLUCOSE BLDC GLUCOMTR-MCNC: 90 MG/DL — SIGNIFICANT CHANGE UP (ref 70–99)
GLUCOSE BLDC GLUCOMTR-MCNC: 98 MG/DL — SIGNIFICANT CHANGE UP (ref 70–99)
GLUCOSE BLDV-MCNC: 148 MG/DL — HIGH (ref 70–99)
GLUCOSE BLDV-MCNC: 298 MG/DL — HIGH (ref 70–99)
GLUCOSE SERPL-MCNC: 130 MG/DL — HIGH (ref 70–99)
GLUCOSE SERPL-MCNC: 139 MG/DL — HIGH (ref 70–99)
GLUCOSE SERPL-MCNC: 140 MG/DL — HIGH (ref 70–99)
GLUCOSE SERPL-MCNC: 141 MG/DL — HIGH (ref 70–99)
GLUCOSE SERPL-MCNC: 141 MG/DL — HIGH (ref 70–99)
GLUCOSE SERPL-MCNC: 143 MG/DL — HIGH (ref 70–99)
GLUCOSE SERPL-MCNC: 145 MG/DL — HIGH (ref 70–99)
GLUCOSE SERPL-MCNC: 156 MG/DL — HIGH (ref 70–99)
GLUCOSE SERPL-MCNC: 158 MG/DL — HIGH (ref 70–99)
GLUCOSE SERPL-MCNC: 210 MG/DL — HIGH (ref 70–99)
GLUCOSE SERPL-MCNC: 229 MG/DL — HIGH (ref 70–99)
GLUCOSE SERPL-MCNC: 299 MG/DL — HIGH (ref 70–99)
GLUCOSE SERPL-MCNC: 85 MG/DL — SIGNIFICANT CHANGE UP (ref 70–99)
GLUCOSE SERPL-MCNC: 85 MG/DL — SIGNIFICANT CHANGE UP (ref 70–99)
GLUCOSE UR QL: NEGATIVE MG/DL — SIGNIFICANT CHANGE UP
GLUCOSE UR QL: NEGATIVE MG/DL — SIGNIFICANT CHANGE UP
GRAM STN FLD: SIGNIFICANT CHANGE UP
GRAM STN FLD: SIGNIFICANT CHANGE UP
GRAN CASTS # UR COMP ASSIST: ABNORMAL /LPF
H PYLORI AG STL QL: NEGATIVE — SIGNIFICANT CHANGE UP
HCO3 BLDV-SCNC: 23 MMOL/L — SIGNIFICANT CHANGE UP (ref 22–29)
HCO3 BLDV-SCNC: 25 MMOL/L — SIGNIFICANT CHANGE UP (ref 22–29)
HCT VFR BLD CALC: 20.4 % — CRITICAL LOW (ref 34.5–45)
HCT VFR BLD CALC: 20.9 % — CRITICAL LOW (ref 34.5–45)
HCT VFR BLD CALC: 22.3 % — LOW (ref 34.5–45)
HCT VFR BLD CALC: 23.1 % — LOW (ref 34.5–45)
HCT VFR BLD CALC: 23.3 % — LOW (ref 34.5–45)
HCT VFR BLD CALC: 23.5 % — LOW (ref 34.5–45)
HCT VFR BLD CALC: 23.8 % — LOW (ref 34.5–45)
HCT VFR BLD CALC: 24 % — LOW (ref 34.5–45)
HCT VFR BLD CALC: 24 % — LOW (ref 34.5–45)
HCT VFR BLD CALC: 25.5 % — LOW (ref 34.5–45)
HCT VFR BLD CALC: 26.4 % — LOW (ref 34.5–45)
HCT VFR BLD CALC: 27.2 % — LOW (ref 34.5–45)
HCT VFR BLD CALC: 27.5 % — LOW (ref 34.5–45)
HCT VFR BLD CALC: 28.1 % — LOW (ref 34.5–45)
HCT VFR BLD CALC: 28.5 % — LOW (ref 34.5–45)
HCT VFR BLD CALC: 28.6 % — LOW (ref 34.5–45)
HCT VFR BLD CALC: 29 % — LOW (ref 34.5–45)
HCT VFR BLD CALC: 29.7 % — LOW (ref 34.5–45)
HCT VFR BLD CALC: 30.5 % — LOW (ref 34.5–45)
HCT VFR BLDA CALC: 22 % — SIGNIFICANT CHANGE UP
HCT VFR BLDA CALC: 31 % — SIGNIFICANT CHANGE UP
HGB BLD CALC-MCNC: 10.2 G/DL — LOW (ref 11.7–16.1)
HGB BLD CALC-MCNC: 7.2 G/DL — LOW (ref 11.7–16.1)
HGB BLD-MCNC: 10.1 G/DL — LOW (ref 11.5–15.5)
HGB BLD-MCNC: 6.7 G/DL — CRITICAL LOW (ref 11.5–15.5)
HGB BLD-MCNC: 6.7 G/DL — CRITICAL LOW (ref 11.5–15.5)
HGB BLD-MCNC: 7.2 G/DL — LOW (ref 11.5–15.5)
HGB BLD-MCNC: 7.5 G/DL — LOW (ref 11.5–15.5)
HGB BLD-MCNC: 7.6 G/DL — LOW (ref 11.5–15.5)
HGB BLD-MCNC: 7.7 G/DL — LOW (ref 11.5–15.5)
HGB BLD-MCNC: 7.9 G/DL — LOW (ref 11.5–15.5)
HGB BLD-MCNC: 8.4 G/DL — LOW (ref 11.5–15.5)
HGB BLD-MCNC: 8.7 G/DL — LOW (ref 11.5–15.5)
HGB BLD-MCNC: 8.9 G/DL — LOW (ref 11.5–15.5)
HGB BLD-MCNC: 9.1 G/DL — LOW (ref 11.5–15.5)
HGB BLD-MCNC: 9.2 G/DL — LOW (ref 11.5–15.5)
HGB BLD-MCNC: 9.4 G/DL — LOW (ref 11.5–15.5)
HGB BLD-MCNC: 9.4 G/DL — LOW (ref 11.5–15.5)
HGB BLD-MCNC: 9.5 G/DL — LOW (ref 11.5–15.5)
HGB BLD-MCNC: 9.9 G/DL — LOW (ref 11.5–15.5)
HOROWITZ INDEX BLDV+IHG-RTO: 4 — SIGNIFICANT CHANGE UP
HYALINE CASTS # UR AUTO: ABNORMAL /LPF
IMM GRANULOCYTES NFR BLD AUTO: 0.5 % — SIGNIFICANT CHANGE UP (ref 0–0.9)
IMM GRANULOCYTES NFR BLD AUTO: 1.4 % — HIGH (ref 0–0.9)
INR BLD: 1.05 RATIO — SIGNIFICANT CHANGE UP (ref 0.88–1.16)
IRON SATN MFR SERPL: 15 % — SIGNIFICANT CHANGE UP (ref 14–50)
IRON SATN MFR SERPL: 45 UG/DL — SIGNIFICANT CHANGE UP (ref 37–145)
KETONES UR-MCNC: NEGATIVE — SIGNIFICANT CHANGE UP
KETONES UR-MCNC: NEGATIVE — SIGNIFICANT CHANGE UP
LACTATE BLDV-MCNC: 1.4 MMOL/L — SIGNIFICANT CHANGE UP (ref 0.5–2)
LACTATE BLDV-MCNC: 4.9 MMOL/L — CRITICAL HIGH (ref 0.5–2)
LACTATE SERPL-SCNC: 0.5 MMOL/L — SIGNIFICANT CHANGE UP (ref 0.5–2)
LACTATE SERPL-SCNC: 1.4 MMOL/L — SIGNIFICANT CHANGE UP (ref 0.5–2)
LACTATE SERPL-SCNC: 1.5 MMOL/L — SIGNIFICANT CHANGE UP (ref 0.5–2)
LACTATE SERPL-SCNC: 2.3 MMOL/L — HIGH (ref 0.5–2)
LACTATE SERPL-SCNC: 2.8 MMOL/L — HIGH (ref 0.5–2)
LEGIONELLA AG UR QL: NEGATIVE — SIGNIFICANT CHANGE UP
LEUKOCYTE ESTERASE UR-ACNC: ABNORMAL
LEUKOCYTE ESTERASE UR-ACNC: ABNORMAL
LYMPHOCYTES # BLD AUTO: 0.33 K/UL — LOW (ref 1–3.3)
LYMPHOCYTES # BLD AUTO: 0.69 K/UL — LOW (ref 1–3.3)
LYMPHOCYTES # BLD AUTO: 11 % — LOW (ref 13–44)
LYMPHOCYTES # BLD AUTO: 2.05 K/UL — SIGNIFICANT CHANGE UP (ref 1–3.3)
LYMPHOCYTES # BLD AUTO: 2.7 % — LOW (ref 13–44)
LYMPHOCYTES # BLD AUTO: 23.3 % — SIGNIFICANT CHANGE UP (ref 13–44)
MACROCYTES BLD QL: SLIGHT — SIGNIFICANT CHANGE UP
MAGNESIUM SERPL-MCNC: 1.9 MG/DL — SIGNIFICANT CHANGE UP (ref 1.6–2.6)
MAGNESIUM SERPL-MCNC: 1.9 MG/DL — SIGNIFICANT CHANGE UP (ref 1.6–2.6)
MAGNESIUM SERPL-MCNC: 2 MG/DL — SIGNIFICANT CHANGE UP (ref 1.6–2.6)
MAGNESIUM SERPL-MCNC: 2 MG/DL — SIGNIFICANT CHANGE UP (ref 1.6–2.6)
MAGNESIUM SERPL-MCNC: 2.1 MG/DL — SIGNIFICANT CHANGE UP (ref 1.6–2.6)
MAGNESIUM SERPL-MCNC: 2.3 MG/DL — SIGNIFICANT CHANGE UP (ref 1.6–2.6)
MAGNESIUM SERPL-MCNC: 2.4 MG/DL — SIGNIFICANT CHANGE UP (ref 1.6–2.6)
MAGNESIUM SERPL-MCNC: 2.5 MG/DL — SIGNIFICANT CHANGE UP (ref 1.6–2.6)
MANUAL SMEAR VERIFICATION: SIGNIFICANT CHANGE UP
MCHC RBC-ENTMCNC: 28.5 PG — SIGNIFICANT CHANGE UP (ref 27–34)
MCHC RBC-ENTMCNC: 28.8 PG — SIGNIFICANT CHANGE UP (ref 27–34)
MCHC RBC-ENTMCNC: 29 PG — SIGNIFICANT CHANGE UP (ref 27–34)
MCHC RBC-ENTMCNC: 29.3 PG — SIGNIFICANT CHANGE UP (ref 27–34)
MCHC RBC-ENTMCNC: 29.5 PG — SIGNIFICANT CHANGE UP (ref 27–34)
MCHC RBC-ENTMCNC: 29.6 PG — SIGNIFICANT CHANGE UP (ref 27–34)
MCHC RBC-ENTMCNC: 29.7 PG — SIGNIFICANT CHANGE UP (ref 27–34)
MCHC RBC-ENTMCNC: 29.8 PG — SIGNIFICANT CHANGE UP (ref 27–34)
MCHC RBC-ENTMCNC: 29.9 PG — SIGNIFICANT CHANGE UP (ref 27–34)
MCHC RBC-ENTMCNC: 30 GM/DL — LOW (ref 32–36)
MCHC RBC-ENTMCNC: 30 PG — SIGNIFICANT CHANGE UP (ref 27–34)
MCHC RBC-ENTMCNC: 30.2 PG — SIGNIFICANT CHANGE UP (ref 27–34)
MCHC RBC-ENTMCNC: 30.3 PG — SIGNIFICANT CHANGE UP (ref 27–34)
MCHC RBC-ENTMCNC: 30.3 PG — SIGNIFICANT CHANGE UP (ref 27–34)
MCHC RBC-ENTMCNC: 30.4 PG — SIGNIFICANT CHANGE UP (ref 27–34)
MCHC RBC-ENTMCNC: 30.4 PG — SIGNIFICANT CHANGE UP (ref 27–34)
MCHC RBC-ENTMCNC: 30.5 PG — SIGNIFICANT CHANGE UP (ref 27–34)
MCHC RBC-ENTMCNC: 30.5 PG — SIGNIFICANT CHANGE UP (ref 27–34)
MCHC RBC-ENTMCNC: 30.6 PG — SIGNIFICANT CHANGE UP (ref 27–34)
MCHC RBC-ENTMCNC: 30.7 PG — SIGNIFICANT CHANGE UP (ref 27–34)
MCHC RBC-ENTMCNC: 31 GM/DL — LOW (ref 32–36)
MCHC RBC-ENTMCNC: 32.1 GM/DL — SIGNIFICANT CHANGE UP (ref 32–36)
MCHC RBC-ENTMCNC: 32.2 GM/DL — SIGNIFICANT CHANGE UP (ref 32–36)
MCHC RBC-ENTMCNC: 32.3 GM/DL — SIGNIFICANT CHANGE UP (ref 32–36)
MCHC RBC-ENTMCNC: 32.7 GM/DL — SIGNIFICANT CHANGE UP (ref 32–36)
MCHC RBC-ENTMCNC: 32.8 GM/DL — SIGNIFICANT CHANGE UP (ref 32–36)
MCHC RBC-ENTMCNC: 32.9 GM/DL — SIGNIFICANT CHANGE UP (ref 32–36)
MCHC RBC-ENTMCNC: 32.9 GM/DL — SIGNIFICANT CHANGE UP (ref 32–36)
MCHC RBC-ENTMCNC: 33 GM/DL — SIGNIFICANT CHANGE UP (ref 32–36)
MCHC RBC-ENTMCNC: 33 GM/DL — SIGNIFICANT CHANGE UP (ref 32–36)
MCHC RBC-ENTMCNC: 33.1 GM/DL — SIGNIFICANT CHANGE UP (ref 32–36)
MCHC RBC-ENTMCNC: 33.1 GM/DL — SIGNIFICANT CHANGE UP (ref 32–36)
MCHC RBC-ENTMCNC: 33.2 GM/DL — SIGNIFICANT CHANGE UP (ref 32–36)
MCHC RBC-ENTMCNC: 33.2 GM/DL — SIGNIFICANT CHANGE UP (ref 32–36)
MCHC RBC-ENTMCNC: 33.5 GM/DL — SIGNIFICANT CHANGE UP (ref 32–36)
MCHC RBC-ENTMCNC: 34.1 GM/DL — SIGNIFICANT CHANGE UP (ref 32–36)
MCHC RBC-ENTMCNC: 34.2 GM/DL — SIGNIFICANT CHANGE UP (ref 32–36)
MCHC RBC-ENTMCNC: 34.4 GM/DL — SIGNIFICANT CHANGE UP (ref 32–36)
MCV RBC AUTO: 102.3 FL — HIGH (ref 80–100)
MCV RBC AUTO: 86.7 FL — SIGNIFICANT CHANGE UP (ref 80–100)
MCV RBC AUTO: 87.8 FL — SIGNIFICANT CHANGE UP (ref 80–100)
MCV RBC AUTO: 88.1 FL — SIGNIFICANT CHANGE UP (ref 80–100)
MCV RBC AUTO: 88.1 FL — SIGNIFICANT CHANGE UP (ref 80–100)
MCV RBC AUTO: 88.3 FL — SIGNIFICANT CHANGE UP (ref 80–100)
MCV RBC AUTO: 88.7 FL — SIGNIFICANT CHANGE UP (ref 80–100)
MCV RBC AUTO: 89 FL — SIGNIFICANT CHANGE UP (ref 80–100)
MCV RBC AUTO: 89.4 FL — SIGNIFICANT CHANGE UP (ref 80–100)
MCV RBC AUTO: 89.9 FL — SIGNIFICANT CHANGE UP (ref 80–100)
MCV RBC AUTO: 90.5 FL — SIGNIFICANT CHANGE UP (ref 80–100)
MCV RBC AUTO: 91.3 FL — SIGNIFICANT CHANGE UP (ref 80–100)
MCV RBC AUTO: 91.7 FL — SIGNIFICANT CHANGE UP (ref 80–100)
MCV RBC AUTO: 92 FL — SIGNIFICANT CHANGE UP (ref 80–100)
MCV RBC AUTO: 92.3 FL — SIGNIFICANT CHANGE UP (ref 80–100)
MCV RBC AUTO: 92.7 FL — SIGNIFICANT CHANGE UP (ref 80–100)
MCV RBC AUTO: 93.2 FL — SIGNIFICANT CHANGE UP (ref 80–100)
MCV RBC AUTO: 94.9 FL — SIGNIFICANT CHANGE UP (ref 80–100)
MCV RBC AUTO: 95.1 FL — SIGNIFICANT CHANGE UP (ref 80–100)
METHOD TYPE: SIGNIFICANT CHANGE UP
MICROCYTES BLD QL: SLIGHT — SIGNIFICANT CHANGE UP
MONOCYTES # BLD AUTO: 0.62 K/UL — SIGNIFICANT CHANGE UP (ref 0–0.9)
MONOCYTES # BLD AUTO: 0.74 K/UL — SIGNIFICANT CHANGE UP (ref 0–0.9)
MONOCYTES # BLD AUTO: 0.77 K/UL — SIGNIFICANT CHANGE UP (ref 0–0.9)
MONOCYTES NFR BLD AUTO: 6.2 % — SIGNIFICANT CHANGE UP (ref 2–14)
MONOCYTES NFR BLD AUTO: 8.4 % — SIGNIFICANT CHANGE UP (ref 2–14)
MONOCYTES NFR BLD AUTO: 9.8 % — SIGNIFICANT CHANGE UP (ref 2–14)
MRSA PCR RESULT.: SIGNIFICANT CHANGE UP
NEUTROPHILS # BLD AUTO: 10.58 K/UL — HIGH (ref 1.8–7.4)
NEUTROPHILS # BLD AUTO: 4.42 K/UL — SIGNIFICANT CHANGE UP (ref 1.8–7.4)
NEUTROPHILS # BLD AUTO: 5.63 K/UL — SIGNIFICANT CHANGE UP (ref 1.8–7.4)
NEUTROPHILS NFR BLD AUTO: 64.1 % — SIGNIFICANT CHANGE UP (ref 43–77)
NEUTROPHILS NFR BLD AUTO: 70.1 % — SIGNIFICANT CHANGE UP (ref 43–77)
NEUTROPHILS NFR BLD AUTO: 85.7 % — HIGH (ref 43–77)
NEUTS BAND # BLD: 1.8 % — SIGNIFICANT CHANGE UP (ref 0–8)
NITRITE UR-MCNC: NEGATIVE — SIGNIFICANT CHANGE UP
NITRITE UR-MCNC: NEGATIVE — SIGNIFICANT CHANGE UP
NRBC # BLD: 1 /100 WBCS — HIGH (ref 0–0)
NRBC # BLD: 1 /100 — HIGH (ref 0–0)
NRBC # BLD: 6 /100 WBCS — HIGH (ref 0–0)
NRBC # BLD: 6 /100 WBCS — HIGH (ref 0–0)
NT-PROBNP SERPL-SCNC: HIGH PG/ML (ref 0–300)
OB PNL STL: POSITIVE
ORGANISM # SPEC MICROSCOPIC CNT: SIGNIFICANT CHANGE UP
PCO2 BLDV: 45 MMHG — HIGH (ref 39–42)
PCO2 BLDV: 59 MMHG — HIGH (ref 39–42)
PH BLDV: 7.23 — LOW (ref 7.32–7.43)
PH BLDV: 7.31 — LOW (ref 7.32–7.43)
PH UR: 5 — SIGNIFICANT CHANGE UP (ref 5–8)
PH UR: 6 — SIGNIFICANT CHANGE UP (ref 5–8)
PHOSPHATE SERPL-MCNC: 3.6 MG/DL — SIGNIFICANT CHANGE UP (ref 2.4–4.7)
PHOSPHATE SERPL-MCNC: 3.8 MG/DL — SIGNIFICANT CHANGE UP (ref 2.4–4.7)
PHOSPHATE SERPL-MCNC: 3.9 MG/DL — SIGNIFICANT CHANGE UP (ref 2.4–4.7)
PHOSPHATE SERPL-MCNC: 4.1 MG/DL — SIGNIFICANT CHANGE UP (ref 2.4–4.7)
PHOSPHATE SERPL-MCNC: 4.1 MG/DL — SIGNIFICANT CHANGE UP (ref 2.4–4.7)
PHOSPHATE SERPL-MCNC: 4.2 MG/DL — SIGNIFICANT CHANGE UP (ref 2.4–4.7)
PHOSPHATE SERPL-MCNC: 4.3 MG/DL — SIGNIFICANT CHANGE UP (ref 2.4–4.7)
PHOSPHATE SERPL-MCNC: 4.4 MG/DL — SIGNIFICANT CHANGE UP (ref 2.4–4.7)
PLAT MORPH BLD: NORMAL — SIGNIFICANT CHANGE UP
PLAT MORPH BLD: NORMAL — SIGNIFICANT CHANGE UP
PLATELET # BLD AUTO: 150 K/UL — SIGNIFICANT CHANGE UP (ref 150–400)
PLATELET # BLD AUTO: 154 K/UL — SIGNIFICANT CHANGE UP (ref 150–400)
PLATELET # BLD AUTO: 158 K/UL — SIGNIFICANT CHANGE UP (ref 150–400)
PLATELET # BLD AUTO: 159 K/UL — SIGNIFICANT CHANGE UP (ref 150–400)
PLATELET # BLD AUTO: 164 K/UL — SIGNIFICANT CHANGE UP (ref 150–400)
PLATELET # BLD AUTO: 165 K/UL — SIGNIFICANT CHANGE UP (ref 150–400)
PLATELET # BLD AUTO: 165 K/UL — SIGNIFICANT CHANGE UP (ref 150–400)
PLATELET # BLD AUTO: 167 K/UL — SIGNIFICANT CHANGE UP (ref 150–400)
PLATELET # BLD AUTO: 169 K/UL — SIGNIFICANT CHANGE UP (ref 150–400)
PLATELET # BLD AUTO: 173 K/UL — SIGNIFICANT CHANGE UP (ref 150–400)
PLATELET # BLD AUTO: 174 K/UL — SIGNIFICANT CHANGE UP (ref 150–400)
PLATELET # BLD AUTO: 174 K/UL — SIGNIFICANT CHANGE UP (ref 150–400)
PLATELET # BLD AUTO: 177 K/UL — SIGNIFICANT CHANGE UP (ref 150–400)
PLATELET # BLD AUTO: 181 K/UL — SIGNIFICANT CHANGE UP (ref 150–400)
PLATELET # BLD AUTO: 188 K/UL — SIGNIFICANT CHANGE UP (ref 150–400)
PLATELET # BLD AUTO: 195 K/UL — SIGNIFICANT CHANGE UP (ref 150–400)
PLATELET # BLD AUTO: 200 K/UL — SIGNIFICANT CHANGE UP (ref 150–400)
PLATELET # BLD AUTO: 251 K/UL — SIGNIFICANT CHANGE UP (ref 150–400)
PLATELET # BLD AUTO: 252 K/UL — SIGNIFICANT CHANGE UP (ref 150–400)
PO2 BLDV: 129 MMHG — HIGH (ref 25–45)
PO2 BLDV: <42 MMHG — SIGNIFICANT CHANGE UP (ref 25–45)
POIKILOCYTOSIS BLD QL AUTO: SIGNIFICANT CHANGE UP
POIKILOCYTOSIS BLD QL AUTO: SLIGHT — SIGNIFICANT CHANGE UP
POLYCHROMASIA BLD QL SMEAR: SLIGHT — SIGNIFICANT CHANGE UP
POLYCHROMASIA BLD QL SMEAR: SLIGHT — SIGNIFICANT CHANGE UP
POTASSIUM BLDV-SCNC: 3.8 MMOL/L — SIGNIFICANT CHANGE UP (ref 3.5–5.1)
POTASSIUM BLDV-SCNC: 6.8 MMOL/L — CRITICAL HIGH (ref 3.5–5.1)
POTASSIUM SERPL-MCNC: 3.4 MMOL/L — LOW (ref 3.5–5.3)
POTASSIUM SERPL-MCNC: 3.6 MMOL/L — SIGNIFICANT CHANGE UP (ref 3.5–5.3)
POTASSIUM SERPL-MCNC: 3.6 MMOL/L — SIGNIFICANT CHANGE UP (ref 3.5–5.3)
POTASSIUM SERPL-MCNC: 3.8 MMOL/L — SIGNIFICANT CHANGE UP (ref 3.5–5.3)
POTASSIUM SERPL-MCNC: 3.9 MMOL/L — SIGNIFICANT CHANGE UP (ref 3.5–5.3)
POTASSIUM SERPL-MCNC: 3.9 MMOL/L — SIGNIFICANT CHANGE UP (ref 3.5–5.3)
POTASSIUM SERPL-MCNC: 4.4 MMOL/L — SIGNIFICANT CHANGE UP (ref 3.5–5.3)
POTASSIUM SERPL-MCNC: 5.1 MMOL/L — SIGNIFICANT CHANGE UP (ref 3.5–5.3)
POTASSIUM SERPL-MCNC: 5.5 MMOL/L — HIGH (ref 3.5–5.3)
POTASSIUM SERPL-MCNC: 5.5 MMOL/L — HIGH (ref 3.5–5.3)
POTASSIUM SERPL-MCNC: 5.9 MMOL/L — HIGH (ref 3.5–5.3)
POTASSIUM SERPL-MCNC: 5.9 MMOL/L — HIGH (ref 3.5–5.3)
POTASSIUM SERPL-MCNC: 6.7 MMOL/L — CRITICAL HIGH (ref 3.5–5.3)
POTASSIUM SERPL-MCNC: 6.8 MMOL/L — CRITICAL HIGH (ref 3.5–5.3)
POTASSIUM SERPL-SCNC: 3.4 MMOL/L — LOW (ref 3.5–5.3)
POTASSIUM SERPL-SCNC: 3.6 MMOL/L — SIGNIFICANT CHANGE UP (ref 3.5–5.3)
POTASSIUM SERPL-SCNC: 3.6 MMOL/L — SIGNIFICANT CHANGE UP (ref 3.5–5.3)
POTASSIUM SERPL-SCNC: 3.8 MMOL/L — SIGNIFICANT CHANGE UP (ref 3.5–5.3)
POTASSIUM SERPL-SCNC: 3.9 MMOL/L — SIGNIFICANT CHANGE UP (ref 3.5–5.3)
POTASSIUM SERPL-SCNC: 3.9 MMOL/L — SIGNIFICANT CHANGE UP (ref 3.5–5.3)
POTASSIUM SERPL-SCNC: 4.4 MMOL/L — SIGNIFICANT CHANGE UP (ref 3.5–5.3)
POTASSIUM SERPL-SCNC: 5.1 MMOL/L — SIGNIFICANT CHANGE UP (ref 3.5–5.3)
POTASSIUM SERPL-SCNC: 5.5 MMOL/L — HIGH (ref 3.5–5.3)
POTASSIUM SERPL-SCNC: 5.5 MMOL/L — HIGH (ref 3.5–5.3)
POTASSIUM SERPL-SCNC: 5.9 MMOL/L — HIGH (ref 3.5–5.3)
POTASSIUM SERPL-SCNC: 5.9 MMOL/L — HIGH (ref 3.5–5.3)
POTASSIUM SERPL-SCNC: 6.7 MMOL/L — CRITICAL HIGH (ref 3.5–5.3)
POTASSIUM SERPL-SCNC: 6.8 MMOL/L — CRITICAL HIGH (ref 3.5–5.3)
PROCALCITONIN SERPL-MCNC: 0.37 NG/ML — HIGH (ref 0.02–0.1)
PROCALCITONIN SERPL-MCNC: 0.46 NG/ML — HIGH (ref 0.02–0.1)
PROT SERPL-MCNC: 4.6 G/DL — LOW (ref 6.6–8.7)
PROT SERPL-MCNC: 4.8 G/DL — LOW (ref 6.6–8.7)
PROT SERPL-MCNC: 4.9 G/DL — LOW (ref 6.6–8.7)
PROT SERPL-MCNC: 5.2 G/DL — LOW (ref 6.6–8.7)
PROT UR-MCNC: 15
PROT UR-MCNC: NEGATIVE — SIGNIFICANT CHANGE UP
PROTHROM AB SERPL-ACNC: 12.2 SEC — SIGNIFICANT CHANGE UP (ref 10.5–13.4)
PTH-INTACT FLD-MCNC: 80 PG/ML — HIGH (ref 15–65)
RAPID RVP RESULT: SIGNIFICANT CHANGE UP
RAPID RVP RESULT: SIGNIFICANT CHANGE UP
RBC # BLD: 2.18 M/UL — LOW (ref 3.8–5.2)
RBC # BLD: 2.31 M/UL — LOW (ref 3.8–5.2)
RBC # BLD: 2.41 M/UL — LOW (ref 3.8–5.2)
RBC # BLD: 2.45 M/UL — LOW (ref 3.8–5.2)
RBC # BLD: 2.53 M/UL — LOW (ref 3.8–5.2)
RBC # BLD: 2.63 M/UL — LOW (ref 3.8–5.2)
RBC # BLD: 2.64 M/UL — LOW (ref 3.8–5.2)
RBC # BLD: 2.67 M/UL — LOW (ref 3.8–5.2)
RBC # BLD: 2.7 M/UL — LOW (ref 3.8–5.2)
RBC # BLD: 2.75 M/UL — LOW (ref 3.8–5.2)
RBC # BLD: 2.86 M/UL — LOW (ref 3.8–5.2)
RBC # BLD: 2.92 M/UL — LOW (ref 3.8–5.2)
RBC # BLD: 3 M/UL — LOW (ref 3.8–5.2)
RBC # BLD: 3.15 M/UL — LOW (ref 3.8–5.2)
RBC # BLD: 3.19 M/UL — LOW (ref 3.8–5.2)
RBC # BLD: 3.2 M/UL — LOW (ref 3.8–5.2)
RBC # BLD: 3.23 M/UL — LOW (ref 3.8–5.2)
RBC # BLD: 3.27 M/UL — LOW (ref 3.8–5.2)
RBC # BLD: 3.34 M/UL — LOW (ref 3.8–5.2)
RBC # FLD: 16.5 % — HIGH (ref 10.3–14.5)
RBC # FLD: 17.2 % — HIGH (ref 10.3–14.5)
RBC # FLD: 17.4 % — HIGH (ref 10.3–14.5)
RBC # FLD: 17.6 % — HIGH (ref 10.3–14.5)
RBC # FLD: 17.6 % — HIGH (ref 10.3–14.5)
RBC # FLD: 18.1 % — HIGH (ref 10.3–14.5)
RBC # FLD: 18.1 % — HIGH (ref 10.3–14.5)
RBC # FLD: 18.2 % — HIGH (ref 10.3–14.5)
RBC # FLD: 18.3 % — HIGH (ref 10.3–14.5)
RBC # FLD: 18.3 % — HIGH (ref 10.3–14.5)
RBC # FLD: 18.4 % — HIGH (ref 10.3–14.5)
RBC # FLD: 18.5 % — HIGH (ref 10.3–14.5)
RBC # FLD: 18.6 % — HIGH (ref 10.3–14.5)
RBC # FLD: 18.8 % — HIGH (ref 10.3–14.5)
RBC # FLD: 19.4 % — HIGH (ref 10.3–14.5)
RBC # FLD: 19.8 % — HIGH (ref 10.3–14.5)
RBC # FLD: 19.9 % — HIGH (ref 10.3–14.5)
RBC # FLD: 21.8 % — HIGH (ref 10.3–14.5)
RBC # FLD: 22.3 % — HIGH (ref 10.3–14.5)
RBC BLD AUTO: ABNORMAL
RBC BLD AUTO: SIGNIFICANT CHANGE UP
RBC CASTS # UR COMP ASSIST: ABNORMAL /HPF (ref 0–4)
RBC CASTS # UR COMP ASSIST: ABNORMAL /HPF (ref 0–4)
S AUREUS DNA NOSE QL NAA+PROBE: SIGNIFICANT CHANGE UP
S PNEUM AG UR QL: NEGATIVE — SIGNIFICANT CHANGE UP
SAO2 % BLDV: 69.7 % — SIGNIFICANT CHANGE UP
SAO2 % BLDV: 99.4 % — SIGNIFICANT CHANGE UP
SARS-COV-2 RNA SPEC QL NAA+PROBE: SIGNIFICANT CHANGE UP
SARS-COV-2 RNA SPEC QL NAA+PROBE: SIGNIFICANT CHANGE UP
SODIUM SERPL-SCNC: 136 MMOL/L — SIGNIFICANT CHANGE UP (ref 135–145)
SODIUM SERPL-SCNC: 136 MMOL/L — SIGNIFICANT CHANGE UP (ref 135–145)
SODIUM SERPL-SCNC: 137 MMOL/L — SIGNIFICANT CHANGE UP (ref 135–145)
SODIUM SERPL-SCNC: 138 MMOL/L — SIGNIFICANT CHANGE UP (ref 135–145)
SODIUM SERPL-SCNC: 140 MMOL/L — SIGNIFICANT CHANGE UP (ref 135–145)
SODIUM SERPL-SCNC: 140 MMOL/L — SIGNIFICANT CHANGE UP (ref 135–145)
SODIUM SERPL-SCNC: 141 MMOL/L — SIGNIFICANT CHANGE UP (ref 135–145)
SODIUM SERPL-SCNC: 141 MMOL/L — SIGNIFICANT CHANGE UP (ref 135–145)
SODIUM SERPL-SCNC: 142 MMOL/L — SIGNIFICANT CHANGE UP (ref 135–145)
SODIUM SERPL-SCNC: 143 MMOL/L — SIGNIFICANT CHANGE UP (ref 135–145)
SODIUM SERPL-SCNC: 146 MMOL/L — HIGH (ref 135–145)
SODIUM SERPL-SCNC: 148 MMOL/L — HIGH (ref 135–145)
SP GR SPEC: 1.01 — SIGNIFICANT CHANGE UP (ref 1.01–1.02)
SP GR SPEC: 1.01 — SIGNIFICANT CHANGE UP (ref 1.01–1.02)
SPECIMEN SOURCE: SIGNIFICANT CHANGE UP
T4 AB SER-ACNC: 3.1 UG/DL — LOW (ref 4.5–12)
TIBC SERPL-MCNC: 303 UG/DL — SIGNIFICANT CHANGE UP (ref 220–430)
TRANSFERRIN SERPL-MCNC: 212 MG/DL — SIGNIFICANT CHANGE UP (ref 192–382)
TROPONIN T SERPL-MCNC: 0.48 NG/ML — HIGH (ref 0–0.06)
TROPONIN T SERPL-MCNC: 0.55 NG/ML — HIGH (ref 0–0.06)
TROPONIN T SERPL-MCNC: 0.56 NG/ML — HIGH (ref 0–0.06)
TROPONIN T SERPL-MCNC: 0.59 NG/ML — HIGH (ref 0–0.06)
TROPONIN T SERPL-MCNC: 0.6 NG/ML — HIGH (ref 0–0.06)
TROPONIN T SERPL-MCNC: 0.65 NG/ML — HIGH (ref 0–0.06)
TROPONIN T SERPL-MCNC: 0.76 NG/ML — HIGH (ref 0–0.06)
TROPONIN T SERPL-MCNC: <0.01 NG/ML — SIGNIFICANT CHANGE UP (ref 0–0.06)
TSH SERPL-MCNC: 8.17 UIU/ML — HIGH (ref 0.27–4.2)
UROBILINOGEN FLD QL: NEGATIVE MG/DL — SIGNIFICANT CHANGE UP
UROBILINOGEN FLD QL: NEGATIVE MG/DL — SIGNIFICANT CHANGE UP
VANCOMYCIN TROUGH SERPL-MCNC: 10.9 UG/ML — SIGNIFICANT CHANGE UP (ref 10–20)
VANCOMYCIN TROUGH SERPL-MCNC: 17.5 UG/ML — SIGNIFICANT CHANGE UP (ref 10–20)
VANCOMYCIN TROUGH SERPL-MCNC: 18.6 UG/ML — SIGNIFICANT CHANGE UP (ref 10–20)
VANCOMYCIN TROUGH SERPL-MCNC: 20.7 UG/ML — HIGH (ref 10–20)
VARIANT LYMPHS # BLD: 1.8 % — SIGNIFICANT CHANGE UP (ref 0–6)
WBC # BLD: 10.14 K/UL — SIGNIFICANT CHANGE UP (ref 3.8–10.5)
WBC # BLD: 10.19 K/UL — SIGNIFICANT CHANGE UP (ref 3.8–10.5)
WBC # BLD: 10.38 K/UL — SIGNIFICANT CHANGE UP (ref 3.8–10.5)
WBC # BLD: 10.78 K/UL — HIGH (ref 3.8–10.5)
WBC # BLD: 10.92 K/UL — HIGH (ref 3.8–10.5)
WBC # BLD: 11.16 K/UL — HIGH (ref 3.8–10.5)
WBC # BLD: 11.89 K/UL — HIGH (ref 3.8–10.5)
WBC # BLD: 12.35 K/UL — HIGH (ref 3.8–10.5)
WBC # BLD: 13.23 K/UL — HIGH (ref 3.8–10.5)
WBC # BLD: 13.66 K/UL — HIGH (ref 3.8–10.5)
WBC # BLD: 15.4 K/UL — HIGH (ref 3.8–10.5)
WBC # BLD: 6.3 K/UL — SIGNIFICANT CHANGE UP (ref 3.8–10.5)
WBC # BLD: 6.54 K/UL — SIGNIFICANT CHANGE UP (ref 3.8–10.5)
WBC # BLD: 6.85 K/UL — SIGNIFICANT CHANGE UP (ref 3.8–10.5)
WBC # BLD: 7.61 K/UL — SIGNIFICANT CHANGE UP (ref 3.8–10.5)
WBC # BLD: 7.71 K/UL — SIGNIFICANT CHANGE UP (ref 3.8–10.5)
WBC # BLD: 8.53 K/UL — SIGNIFICANT CHANGE UP (ref 3.8–10.5)
WBC # BLD: 8.79 K/UL — SIGNIFICANT CHANGE UP (ref 3.8–10.5)
WBC # BLD: 9.25 K/UL — SIGNIFICANT CHANGE UP (ref 3.8–10.5)
WBC # FLD AUTO: 10.14 K/UL — SIGNIFICANT CHANGE UP (ref 3.8–10.5)
WBC # FLD AUTO: 10.19 K/UL — SIGNIFICANT CHANGE UP (ref 3.8–10.5)
WBC # FLD AUTO: 10.38 K/UL — SIGNIFICANT CHANGE UP (ref 3.8–10.5)
WBC # FLD AUTO: 10.78 K/UL — HIGH (ref 3.8–10.5)
WBC # FLD AUTO: 10.92 K/UL — HIGH (ref 3.8–10.5)
WBC # FLD AUTO: 11.16 K/UL — HIGH (ref 3.8–10.5)
WBC # FLD AUTO: 11.89 K/UL — HIGH (ref 3.8–10.5)
WBC # FLD AUTO: 12.35 K/UL — HIGH (ref 3.8–10.5)
WBC # FLD AUTO: 13.23 K/UL — HIGH (ref 3.8–10.5)
WBC # FLD AUTO: 13.66 K/UL — HIGH (ref 3.8–10.5)
WBC # FLD AUTO: 15.4 K/UL — HIGH (ref 3.8–10.5)
WBC # FLD AUTO: 6.3 K/UL — SIGNIFICANT CHANGE UP (ref 3.8–10.5)
WBC # FLD AUTO: 6.54 K/UL — SIGNIFICANT CHANGE UP (ref 3.8–10.5)
WBC # FLD AUTO: 6.85 K/UL — SIGNIFICANT CHANGE UP (ref 3.8–10.5)
WBC # FLD AUTO: 7.61 K/UL — SIGNIFICANT CHANGE UP (ref 3.8–10.5)
WBC # FLD AUTO: 7.71 K/UL — SIGNIFICANT CHANGE UP (ref 3.8–10.5)
WBC # FLD AUTO: 8.53 K/UL — SIGNIFICANT CHANGE UP (ref 3.8–10.5)
WBC # FLD AUTO: 8.79 K/UL — SIGNIFICANT CHANGE UP (ref 3.8–10.5)
WBC # FLD AUTO: 9.25 K/UL — SIGNIFICANT CHANGE UP (ref 3.8–10.5)
WBC UR QL: ABNORMAL /HPF (ref 0–5)
WBC UR QL: ABNORMAL /HPF (ref 0–5)

## 2023-01-01 PROCEDURE — 36247 INS CATH ABD/L-EXT ART 3RD: CPT

## 2023-01-01 PROCEDURE — 94640 AIRWAY INHALATION TREATMENT: CPT

## 2023-01-01 PROCEDURE — 99233 SBSQ HOSP IP/OBS HIGH 50: CPT | Mod: GC

## 2023-01-01 PROCEDURE — 82043 UR ALBUMIN QUANTITATIVE: CPT

## 2023-01-01 PROCEDURE — 93306 TTE W/DOPPLER COMPLETE: CPT

## 2023-01-01 PROCEDURE — 85014 HEMATOCRIT: CPT

## 2023-01-01 PROCEDURE — 83540 ASSAY OF IRON: CPT

## 2023-01-01 PROCEDURE — 84145 PROCALCITONIN (PCT): CPT

## 2023-01-01 PROCEDURE — 71250 CT THORAX DX C-: CPT

## 2023-01-01 PROCEDURE — 83880 ASSAY OF NATRIURETIC PEPTIDE: CPT

## 2023-01-01 PROCEDURE — 84484 ASSAY OF TROPONIN QUANT: CPT

## 2023-01-01 PROCEDURE — 99233 SBSQ HOSP IP/OBS HIGH 50: CPT

## 2023-01-01 PROCEDURE — 71045 X-RAY EXAM CHEST 1 VIEW: CPT | Mod: 26

## 2023-01-01 PROCEDURE — 93306 TTE W/DOPPLER COMPLETE: CPT | Mod: 26

## 2023-01-01 PROCEDURE — 82947 ASSAY GLUCOSE BLOOD QUANT: CPT

## 2023-01-01 PROCEDURE — 99223 1ST HOSP IP/OBS HIGH 75: CPT

## 2023-01-01 PROCEDURE — 84132 ASSAY OF SERUM POTASSIUM: CPT

## 2023-01-01 PROCEDURE — 12345: CPT | Mod: NC

## 2023-01-01 PROCEDURE — 74176 CT ABD & PELVIS W/O CONTRAST: CPT

## 2023-01-01 PROCEDURE — 87640 STAPH A DNA AMP PROBE: CPT

## 2023-01-01 PROCEDURE — 36556 INSERT NON-TUNNEL CV CATH: CPT

## 2023-01-01 PROCEDURE — 80048 BASIC METABOLIC PNL TOTAL CA: CPT

## 2023-01-01 PROCEDURE — 86901 BLOOD TYPING SEROLOGIC RH(D): CPT

## 2023-01-01 PROCEDURE — C1769: CPT

## 2023-01-01 PROCEDURE — 85027 COMPLETE CBC AUTOMATED: CPT

## 2023-01-01 PROCEDURE — 85025 COMPLETE CBC W/AUTO DIFF WBC: CPT

## 2023-01-01 PROCEDURE — 85610 PROTHROMBIN TIME: CPT

## 2023-01-01 PROCEDURE — 83550 IRON BINDING TEST: CPT

## 2023-01-01 PROCEDURE — C1889: CPT

## 2023-01-01 PROCEDURE — 74176 CT ABD & PELVIS W/O CONTRAST: CPT | Mod: 26

## 2023-01-01 PROCEDURE — 83036 HEMOGLOBIN GLYCOSYLATED A1C: CPT

## 2023-01-01 PROCEDURE — 96374 THER/PROPH/DIAG INJ IV PUSH: CPT

## 2023-01-01 PROCEDURE — 99232 SBSQ HOSP IP/OBS MODERATE 35: CPT

## 2023-01-01 PROCEDURE — 36415 COLL VENOUS BLD VENIPUNCTURE: CPT

## 2023-01-01 PROCEDURE — 87040 BLOOD CULTURE FOR BACTERIA: CPT

## 2023-01-01 PROCEDURE — 99239 HOSP IP/OBS DSCHRG MGMT >30: CPT

## 2023-01-01 PROCEDURE — 94760 N-INVAS EAR/PLS OXIMETRY 1: CPT

## 2023-01-01 PROCEDURE — 82330 ASSAY OF CALCIUM: CPT

## 2023-01-01 PROCEDURE — 84443 ASSAY THYROID STIM HORMONE: CPT

## 2023-01-01 PROCEDURE — 93010 ELECTROCARDIOGRAM REPORT: CPT

## 2023-01-01 PROCEDURE — 81001 URINALYSIS AUTO W/SCOPE: CPT

## 2023-01-01 PROCEDURE — 82803 BLOOD GASES ANY COMBINATION: CPT

## 2023-01-01 PROCEDURE — 0225U NFCT DS DNA&RNA 21 SARSCOV2: CPT

## 2023-01-01 PROCEDURE — 43235 EGD DIAGNOSTIC BRUSH WASH: CPT

## 2023-01-01 PROCEDURE — 99233 SBSQ HOSP IP/OBS HIGH 50: CPT | Mod: FS

## 2023-01-01 PROCEDURE — 82962 GLUCOSE BLOOD TEST: CPT

## 2023-01-01 PROCEDURE — 87449 NOS EACH ORGANISM AG IA: CPT

## 2023-01-01 PROCEDURE — 87186 SC STD MICRODIL/AGAR DIL: CPT

## 2023-01-01 PROCEDURE — 84466 ASSAY OF TRANSFERRIN: CPT

## 2023-01-01 PROCEDURE — 99222 1ST HOSP IP/OBS MODERATE 55: CPT

## 2023-01-01 PROCEDURE — P9045: CPT

## 2023-01-01 PROCEDURE — 71250 CT THORAX DX C-: CPT | Mod: 26

## 2023-01-01 PROCEDURE — 99497 ADVNCD CARE PLAN 30 MIN: CPT | Mod: 25

## 2023-01-01 PROCEDURE — 85730 THROMBOPLASTIN TIME PARTIAL: CPT

## 2023-01-01 PROCEDURE — 80053 COMPREHEN METABOLIC PANEL: CPT

## 2023-01-01 PROCEDURE — 36245 INS CATH ABD/L-EXT ART 1ST: CPT | Mod: 59

## 2023-01-01 PROCEDURE — 75726 ARTERY X-RAYS ABDOMEN: CPT

## 2023-01-01 PROCEDURE — 76937 US GUIDE VASCULAR ACCESS: CPT | Mod: 26

## 2023-01-01 PROCEDURE — 82728 ASSAY OF FERRITIN: CPT

## 2023-01-01 PROCEDURE — 36430 TRANSFUSION BLD/BLD COMPNT: CPT

## 2023-01-01 PROCEDURE — 82272 OCCULT BLD FECES 1-3 TESTS: CPT

## 2023-01-01 PROCEDURE — 99222 1ST HOSP IP/OBS MODERATE 55: CPT | Mod: FS

## 2023-01-01 PROCEDURE — 92610 EVALUATE SWALLOWING FUNCTION: CPT

## 2023-01-01 PROCEDURE — 75894 X-RAYS TRANSCATH THERAPY: CPT

## 2023-01-01 PROCEDURE — C1894: CPT

## 2023-01-01 PROCEDURE — P9100: CPT

## 2023-01-01 PROCEDURE — 87641 MR-STAPH DNA AMP PROBE: CPT

## 2023-01-01 PROCEDURE — 37244 VASC EMBOLIZE/OCCLUDE BLEED: CPT

## 2023-01-01 PROCEDURE — 86850 RBC ANTIBODY SCREEN: CPT

## 2023-01-01 PROCEDURE — 77001 FLUOROGUIDE FOR VEIN DEVICE: CPT | Mod: 26,59

## 2023-01-01 PROCEDURE — 87077 CULTURE AEROBIC IDENTIFY: CPT

## 2023-01-01 PROCEDURE — 73562 X-RAY EXAM OF KNEE 3: CPT

## 2023-01-01 PROCEDURE — 83735 ASSAY OF MAGNESIUM: CPT

## 2023-01-01 PROCEDURE — 82435 ASSAY OF BLOOD CHLORIDE: CPT

## 2023-01-01 PROCEDURE — 93005 ELECTROCARDIOGRAM TRACING: CPT

## 2023-01-01 PROCEDURE — 76942 ECHO GUIDE FOR BIOPSY: CPT

## 2023-01-01 PROCEDURE — 82306 VITAMIN D 25 HYDROXY: CPT

## 2023-01-01 PROCEDURE — 84295 ASSAY OF SERUM SODIUM: CPT

## 2023-01-01 PROCEDURE — 84100 ASSAY OF PHOSPHORUS: CPT

## 2023-01-01 PROCEDURE — P9011: CPT

## 2023-01-01 PROCEDURE — 92526 ORAL FUNCTION THERAPY: CPT

## 2023-01-01 PROCEDURE — 87150 DNA/RNA AMPLIFIED PROBE: CPT

## 2023-01-01 PROCEDURE — 87899 AGENT NOS ASSAY W/OPTIC: CPT

## 2023-01-01 PROCEDURE — 75774 ARTERY X-RAY EACH VESSEL: CPT

## 2023-01-01 PROCEDURE — 86923 COMPATIBILITY TEST ELECTRIC: CPT

## 2023-01-01 PROCEDURE — 71045 X-RAY EXAM CHEST 1 VIEW: CPT

## 2023-01-01 PROCEDURE — 80202 ASSAY OF VANCOMYCIN: CPT

## 2023-01-01 PROCEDURE — 83605 ASSAY OF LACTIC ACID: CPT

## 2023-01-01 PROCEDURE — 99291 CRITICAL CARE FIRST HOUR: CPT

## 2023-01-01 PROCEDURE — 96375 TX/PRO/DX INJ NEW DRUG ADDON: CPT

## 2023-01-01 PROCEDURE — 86985 SPLIT BLOOD OR PRODUCTS: CPT

## 2023-01-01 PROCEDURE — 84436 ASSAY OF TOTAL THYROXINE: CPT

## 2023-01-01 PROCEDURE — 82310 ASSAY OF CALCIUM: CPT

## 2023-01-01 PROCEDURE — 99285 EMERGENCY DEPT VISIT HI MDM: CPT | Mod: 25

## 2023-01-01 PROCEDURE — P9040: CPT

## 2023-01-01 PROCEDURE — P9037: CPT

## 2023-01-01 PROCEDURE — 86900 BLOOD TYPING SEROLOGIC ABO: CPT

## 2023-01-01 PROCEDURE — 85018 HEMOGLOBIN: CPT

## 2023-01-01 PROCEDURE — 87338 HPYLORI STOOL AG IA: CPT

## 2023-01-01 PROCEDURE — 73502 X-RAY EXAM HIP UNI 2-3 VIEWS: CPT | Mod: 26,LT

## 2023-01-01 PROCEDURE — 73562 X-RAY EXAM OF KNEE 3: CPT | Mod: 26,LT

## 2023-01-01 PROCEDURE — P9016: CPT

## 2023-01-01 PROCEDURE — 73502 X-RAY EXAM HIP UNI 2-3 VIEWS: CPT

## 2023-01-01 PROCEDURE — 83970 ASSAY OF PARATHORMONE: CPT

## 2023-01-01 DEVICE — CLIP RESOLUTION 360 ULTRA 235CM 20/BX: Type: IMPLANTABLE DEVICE | Status: FUNCTIONAL

## 2023-01-01 RX ORDER — DIATRIZOATE MEGLUMINE 180 MG/ML
30 INJECTION, SOLUTION INTRAVESICAL ONCE
Refills: 0 | Status: DISCONTINUED | OUTPATIENT
Start: 2023-01-01 | End: 2023-01-01

## 2023-01-01 RX ORDER — ZOLPIDEM TARTRATE 10 MG/1
5 TABLET ORAL ONCE
Refills: 0 | Status: DISCONTINUED | OUTPATIENT
Start: 2023-01-01 | End: 2023-01-01

## 2023-01-01 RX ORDER — ACETAMINOPHEN 500 MG
2 TABLET ORAL
Qty: 0 | Refills: 0 | DISCHARGE

## 2023-01-01 RX ORDER — INSULIN LISPRO 100/ML
2 VIAL (ML) SUBCUTANEOUS
Refills: 0 | Status: DISCONTINUED | OUTPATIENT
Start: 2023-01-01 | End: 2023-01-01

## 2023-01-01 RX ORDER — MIDODRINE HYDROCHLORIDE 2.5 MG/1
10 TABLET ORAL EVERY 8 HOURS
Refills: 0 | Status: DISCONTINUED | OUTPATIENT
Start: 2023-01-01 | End: 2023-01-01

## 2023-01-01 RX ORDER — LEVOTHYROXINE SODIUM 125 MCG
125 TABLET ORAL AT BEDTIME
Refills: 0 | Status: DISCONTINUED | OUTPATIENT
Start: 2023-01-01 | End: 2023-01-01

## 2023-01-01 RX ORDER — ALBUTEROL 90 UG/1
10 AEROSOL, METERED ORAL ONCE
Refills: 0 | Status: COMPLETED | OUTPATIENT
Start: 2023-01-01 | End: 2023-01-01

## 2023-01-01 RX ORDER — FUROSEMIDE 40 MG
80 TABLET ORAL ONCE
Refills: 0 | Status: COMPLETED | OUTPATIENT
Start: 2023-01-01 | End: 2023-01-01

## 2023-01-01 RX ORDER — SODIUM CHLORIDE 9 MG/ML
1000 INJECTION, SOLUTION INTRAVENOUS
Refills: 0 | Status: DISCONTINUED | OUTPATIENT
Start: 2023-01-01 | End: 2023-01-01

## 2023-01-01 RX ORDER — DEXTROSE 50 % IN WATER 50 %
12.5 SYRINGE (ML) INTRAVENOUS ONCE
Refills: 0 | Status: DISCONTINUED | OUTPATIENT
Start: 2023-01-01 | End: 2023-01-01

## 2023-01-01 RX ORDER — SODIUM CHLORIDE 9 MG/ML
500 INJECTION, SOLUTION INTRAVENOUS ONCE
Refills: 0 | Status: COMPLETED | OUTPATIENT
Start: 2023-01-01 | End: 2023-01-01

## 2023-01-01 RX ORDER — FENOFIBRATE,MICRONIZED 130 MG
1 CAPSULE ORAL
Qty: 0 | Refills: 0 | DISCHARGE

## 2023-01-01 RX ORDER — IRON SUCROSE 20 MG/ML
100 INJECTION, SOLUTION INTRAVENOUS EVERY 24 HOURS
Refills: 0 | Status: COMPLETED | OUTPATIENT
Start: 2023-01-01 | End: 2023-01-01

## 2023-01-01 RX ORDER — LACTULOSE 10 G/15ML
10 SOLUTION ORAL
Qty: 0 | Refills: 0 | DISCHARGE

## 2023-01-01 RX ORDER — SODIUM ZIRCONIUM CYCLOSILICATE 10 G/10G
10 POWDER, FOR SUSPENSION ORAL THREE TIMES A DAY
Refills: 0 | Status: COMPLETED | OUTPATIENT
Start: 2023-01-01 | End: 2023-01-01

## 2023-01-01 RX ORDER — IPRATROPIUM/ALBUTEROL SULFATE 18-103MCG
3 AEROSOL WITH ADAPTER (GRAM) INHALATION ONCE
Refills: 0 | Status: COMPLETED | OUTPATIENT
Start: 2023-01-01 | End: 2023-01-01

## 2023-01-01 RX ORDER — PIPERACILLIN AND TAZOBACTAM 4; .5 G/20ML; G/20ML
3.38 INJECTION, POWDER, LYOPHILIZED, FOR SOLUTION INTRAVENOUS EVERY 12 HOURS
Refills: 0 | Status: DISCONTINUED | OUTPATIENT
Start: 2023-01-01 | End: 2023-01-01

## 2023-01-01 RX ORDER — NOREPINEPHRINE BITARTRATE/D5W 8 MG/250ML
0.05 PLASTIC BAG, INJECTION (ML) INTRAVENOUS
Qty: 8 | Refills: 0 | Status: DISCONTINUED | OUTPATIENT
Start: 2023-01-01 | End: 2023-01-01

## 2023-01-01 RX ORDER — CEFTRIAXONE 500 MG/1
1000 INJECTION, POWDER, FOR SOLUTION INTRAMUSCULAR; INTRAVENOUS ONCE
Refills: 0 | Status: DISCONTINUED | OUTPATIENT
Start: 2023-01-01 | End: 2023-01-01

## 2023-01-01 RX ORDER — FENTANYL CITRATE 50 UG/ML
25 INJECTION INTRAVENOUS ONCE
Refills: 0 | Status: DISCONTINUED | OUTPATIENT
Start: 2023-01-01 | End: 2023-01-01

## 2023-01-01 RX ORDER — ACETAMINOPHEN 500 MG
1000 TABLET ORAL ONCE
Refills: 0 | Status: COMPLETED | OUTPATIENT
Start: 2023-01-01 | End: 2023-01-01

## 2023-01-01 RX ORDER — PANTOPRAZOLE SODIUM 20 MG/1
8 TABLET, DELAYED RELEASE ORAL
Qty: 80 | Refills: 0 | Status: DISCONTINUED | OUTPATIENT
Start: 2023-01-01 | End: 2023-01-01

## 2023-01-01 RX ORDER — CEFTRIAXONE 500 MG/1
1000 INJECTION, POWDER, FOR SOLUTION INTRAMUSCULAR; INTRAVENOUS EVERY 24 HOURS
Refills: 0 | Status: DISCONTINUED | OUTPATIENT
Start: 2023-01-01 | End: 2023-01-01

## 2023-01-01 RX ORDER — MIDODRINE HYDROCHLORIDE 2.5 MG/1
TABLET ORAL
Refills: 0 | Status: DISCONTINUED | OUTPATIENT
Start: 2023-01-01 | End: 2023-01-01

## 2023-01-01 RX ORDER — DEXTROSE 50 % IN WATER 50 %
50 SYRINGE (ML) INTRAVENOUS ONCE
Refills: 0 | Status: COMPLETED | OUTPATIENT
Start: 2023-01-01 | End: 2023-01-01

## 2023-01-01 RX ORDER — CINNAMON BARK 500 MG
2 CAPSULE ORAL
Qty: 0 | Refills: 0 | DISCHARGE

## 2023-01-01 RX ORDER — SODIUM CHLORIDE 9 MG/ML
1000 INJECTION, SOLUTION INTRAVENOUS ONCE
Refills: 0 | Status: COMPLETED | OUTPATIENT
Start: 2023-01-01 | End: 2023-01-01

## 2023-01-01 RX ORDER — NYSTATIN CREAM 100000 [USP'U]/G
1 CREAM TOPICAL
Refills: 0 | Status: DISCONTINUED | OUTPATIENT
Start: 2023-01-01 | End: 2023-01-01

## 2023-01-01 RX ORDER — BENZOCAINE AND MENTHOL 5; 1 G/100ML; G/100ML
1 LIQUID ORAL EVERY 4 HOURS
Refills: 0 | Status: DISCONTINUED | OUTPATIENT
Start: 2023-01-01 | End: 2023-01-01

## 2023-01-01 RX ORDER — CEFTRIAXONE 500 MG/1
1000 INJECTION, POWDER, FOR SOLUTION INTRAMUSCULAR; INTRAVENOUS ONCE
Refills: 0 | Status: COMPLETED | OUTPATIENT
Start: 2023-01-01 | End: 2023-01-01

## 2023-01-01 RX ORDER — PANTOPRAZOLE SODIUM 20 MG/1
40 TABLET, DELAYED RELEASE ORAL
Refills: 0 | Status: DISCONTINUED | OUTPATIENT
Start: 2023-01-01 | End: 2023-01-01

## 2023-01-01 RX ORDER — MULTIVIT-MIN/FERROUS GLUCONATE 9 MG/15 ML
0 LIQUID (ML) ORAL
Qty: 0 | Refills: 0 | DISCHARGE

## 2023-01-01 RX ORDER — PIPERACILLIN AND TAZOBACTAM 4; .5 G/20ML; G/20ML
3.38 INJECTION, POWDER, LYOPHILIZED, FOR SOLUTION INTRAVENOUS ONCE
Refills: 0 | Status: COMPLETED | OUTPATIENT
Start: 2023-01-01 | End: 2023-01-01

## 2023-01-01 RX ORDER — ASPIRIN/CALCIUM CARB/MAGNESIUM 324 MG
81 TABLET ORAL
Qty: 0 | Refills: 0 | DISCHARGE

## 2023-01-01 RX ORDER — BENZOCAINE AND MENTHOL 5; 1 G/100ML; G/100ML
1 LIQUID ORAL ONCE
Refills: 0 | Status: COMPLETED | OUTPATIENT
Start: 2023-01-01 | End: 2023-01-01

## 2023-01-01 RX ORDER — MIDODRINE HYDROCHLORIDE 2.5 MG/1
10 TABLET ORAL ONCE
Refills: 0 | Status: COMPLETED | OUTPATIENT
Start: 2023-01-01 | End: 2023-01-01

## 2023-01-01 RX ORDER — BENZOCAINE AND MENTHOL 5; 1 G/100ML; G/100ML
1 LIQUID ORAL ONCE
Refills: 0 | Status: DISCONTINUED | OUTPATIENT
Start: 2023-01-01 | End: 2023-01-01

## 2023-01-01 RX ORDER — TRAZODONE HCL 50 MG
150 TABLET ORAL
Qty: 0 | Refills: 0 | DISCHARGE

## 2023-01-01 RX ORDER — SODIUM CHLORIDE 9 MG/ML
10 INJECTION INTRAMUSCULAR; INTRAVENOUS; SUBCUTANEOUS
Refills: 0 | Status: DISCONTINUED | OUTPATIENT
Start: 2023-01-01 | End: 2023-01-01

## 2023-01-01 RX ORDER — CALCIUM GLUCONATE 100 MG/ML
2 VIAL (ML) INTRAVENOUS ONCE
Refills: 0 | Status: COMPLETED | OUTPATIENT
Start: 2023-01-01 | End: 2023-01-01

## 2023-01-01 RX ORDER — POTASSIUM CHLORIDE 20 MEQ
10 PACKET (EA) ORAL ONCE
Refills: 0 | Status: COMPLETED | OUTPATIENT
Start: 2023-01-01 | End: 2023-01-01

## 2023-01-01 RX ORDER — METOPROLOL TARTRATE 50 MG
1 TABLET ORAL
Qty: 0 | Refills: 0 | DISCHARGE

## 2023-01-01 RX ORDER — METOPROLOL TARTRATE 50 MG
12.5 TABLET ORAL
Refills: 0 | Status: DISCONTINUED | OUTPATIENT
Start: 2023-01-01 | End: 2023-01-01

## 2023-01-01 RX ORDER — AZITHROMYCIN 500 MG/1
500 TABLET, FILM COATED ORAL ONCE
Refills: 0 | Status: COMPLETED | OUTPATIENT
Start: 2023-01-01 | End: 2023-01-01

## 2023-01-01 RX ORDER — BACILLUS COAGULANS/INULIN 1B-250 MG
1 CAPSULE ORAL
Qty: 0 | Refills: 0 | DISCHARGE

## 2023-01-01 RX ORDER — QUETIAPINE FUMARATE 200 MG/1
12.5 TABLET, FILM COATED ORAL ONCE
Refills: 0 | Status: COMPLETED | OUTPATIENT
Start: 2023-01-01 | End: 2023-01-01

## 2023-01-01 RX ORDER — CLOPIDOGREL BISULFATE 75 MG/1
1 TABLET, FILM COATED ORAL
Qty: 0 | Refills: 0 | DISCHARGE

## 2023-01-01 RX ORDER — PHENYLEPHRINE HYDROCHLORIDE 10 MG/ML
0.5 INJECTION INTRAVENOUS
Qty: 40 | Refills: 0 | Status: DISCONTINUED | OUTPATIENT
Start: 2023-01-01 | End: 2023-01-01

## 2023-01-01 RX ORDER — METOPROLOL TARTRATE 50 MG
5 TABLET ORAL ONCE
Refills: 0 | Status: COMPLETED | OUTPATIENT
Start: 2023-01-01 | End: 2023-01-01

## 2023-01-01 RX ORDER — LEVOTHYROXINE SODIUM 125 MCG
100 TABLET ORAL AT BEDTIME
Refills: 0 | Status: DISCONTINUED | OUTPATIENT
Start: 2023-01-01 | End: 2023-01-01

## 2023-01-01 RX ORDER — ONDANSETRON 8 MG/1
4 TABLET, FILM COATED ORAL ONCE
Refills: 0 | Status: COMPLETED | OUTPATIENT
Start: 2023-01-01 | End: 2023-01-01

## 2023-01-01 RX ORDER — OMEGA-3 ACID ETHYL ESTERS 1 G
1 CAPSULE ORAL
Qty: 0 | Refills: 0 | DISCHARGE

## 2023-01-01 RX ORDER — AZITHROMYCIN 500 MG/1
500 TABLET, FILM COATED ORAL EVERY 24 HOURS
Refills: 0 | Status: DISCONTINUED | OUTPATIENT
Start: 2023-01-01 | End: 2023-01-01

## 2023-01-01 RX ORDER — FUROSEMIDE 40 MG
80 TABLET ORAL
Refills: 0 | Status: DISCONTINUED | OUTPATIENT
Start: 2023-01-01 | End: 2023-01-01

## 2023-01-01 RX ORDER — LEVOTHYROXINE SODIUM 125 MCG
1 TABLET ORAL
Qty: 0 | Refills: 0 | DISCHARGE

## 2023-01-01 RX ORDER — FUROSEMIDE 40 MG
40 TABLET ORAL EVERY 24 HOURS
Refills: 0 | Status: DISCONTINUED | OUTPATIENT
Start: 2023-01-01 | End: 2023-01-01

## 2023-01-01 RX ORDER — ACETYLCYSTEINE 200 MG/ML
4 VIAL (ML) MISCELLANEOUS
Refills: 0 | Status: DISCONTINUED | OUTPATIENT
Start: 2023-01-01 | End: 2023-01-01

## 2023-01-01 RX ORDER — CEFTRIAXONE 500 MG/1
2000 INJECTION, POWDER, FOR SOLUTION INTRAMUSCULAR; INTRAVENOUS EVERY 24 HOURS
Refills: 0 | Status: DISCONTINUED | OUTPATIENT
Start: 2023-01-01 | End: 2023-01-01

## 2023-01-01 RX ORDER — DEXTROSE 50 % IN WATER 50 %
15 SYRINGE (ML) INTRAVENOUS ONCE
Refills: 0 | Status: DISCONTINUED | OUTPATIENT
Start: 2023-01-01 | End: 2023-01-01

## 2023-01-01 RX ORDER — FUROSEMIDE 40 MG
80 TABLET ORAL EVERY 12 HOURS
Refills: 0 | Status: DISCONTINUED | OUTPATIENT
Start: 2023-01-01 | End: 2023-01-01

## 2023-01-01 RX ORDER — HYDROXYZINE HCL 10 MG
50 TABLET ORAL
Qty: 0 | Refills: 0 | DISCHARGE

## 2023-01-01 RX ORDER — LANOLIN ALCOHOL/MO/W.PET/CERES
5 CREAM (GRAM) TOPICAL AT BEDTIME
Refills: 0 | Status: DISCONTINUED | OUTPATIENT
Start: 2023-01-01 | End: 2023-01-01

## 2023-01-01 RX ORDER — POTASSIUM CHLORIDE 20 MEQ
10 PACKET (EA) ORAL ONCE
Refills: 0 | Status: DISCONTINUED | OUTPATIENT
Start: 2023-01-01 | End: 2023-01-01

## 2023-01-01 RX ORDER — INSULIN HUMAN 100 [IU]/ML
10 INJECTION, SOLUTION SUBCUTANEOUS ONCE
Refills: 0 | Status: COMPLETED | OUTPATIENT
Start: 2023-01-01 | End: 2023-01-01

## 2023-01-01 RX ORDER — INSULIN LISPRO 100/ML
VIAL (ML) SUBCUTANEOUS EVERY 6 HOURS
Refills: 0 | Status: DISCONTINUED | OUTPATIENT
Start: 2023-01-01 | End: 2023-01-01

## 2023-01-01 RX ORDER — MIDODRINE HYDROCHLORIDE 2.5 MG/1
10 TABLET ORAL THREE TIMES A DAY
Refills: 0 | Status: DISCONTINUED | OUTPATIENT
Start: 2023-01-01 | End: 2023-01-01

## 2023-01-01 RX ORDER — CALCITRIOL 0.5 UG/1
1 CAPSULE ORAL
Qty: 0 | Refills: 0 | DISCHARGE

## 2023-01-01 RX ORDER — SODIUM ZIRCONIUM CYCLOSILICATE 10 G/10G
10 POWDER, FOR SUSPENSION ORAL EVERY 8 HOURS
Refills: 0 | Status: DISCONTINUED | OUTPATIENT
Start: 2023-01-01 | End: 2023-01-01

## 2023-01-01 RX ORDER — ALBUMIN HUMAN 25 %
250 VIAL (ML) INTRAVENOUS ONCE
Refills: 0 | Status: COMPLETED | OUTPATIENT
Start: 2023-01-01 | End: 2023-01-01

## 2023-01-01 RX ORDER — INSULIN GLARGINE 100 [IU]/ML
20 INJECTION, SOLUTION SUBCUTANEOUS AT BEDTIME
Refills: 0 | Status: DISCONTINUED | OUTPATIENT
Start: 2023-01-01 | End: 2023-01-01

## 2023-01-01 RX ORDER — DEXTROSE 50 % IN WATER 50 %
25 SYRINGE (ML) INTRAVENOUS ONCE
Refills: 0 | Status: DISCONTINUED | OUTPATIENT
Start: 2023-01-01 | End: 2023-01-01

## 2023-01-01 RX ORDER — VANCOMYCIN HCL 1 G
750 VIAL (EA) INTRAVENOUS
Refills: 0 | Status: DISCONTINUED | OUTPATIENT
Start: 2023-01-01 | End: 2023-01-01

## 2023-01-01 RX ORDER — INFLUENZA VIRUS VACCINE 15; 15; 15; 15 UG/.5ML; UG/.5ML; UG/.5ML; UG/.5ML
0.7 SUSPENSION INTRAMUSCULAR ONCE
Refills: 0 | Status: DISCONTINUED | OUTPATIENT
Start: 2023-01-01 | End: 2023-01-01

## 2023-01-01 RX ORDER — TRAZODONE HCL 50 MG
50 TABLET ORAL AT BEDTIME
Refills: 0 | Status: DISCONTINUED | OUTPATIENT
Start: 2023-01-01 | End: 2023-01-01

## 2023-01-01 RX ORDER — ACETAMINOPHEN 500 MG
1000 TABLET ORAL ONCE
Refills: 0 | Status: DISCONTINUED | OUTPATIENT
Start: 2023-01-01 | End: 2023-01-01

## 2023-01-01 RX ORDER — GLUCAGON INJECTION, SOLUTION 0.5 MG/.1ML
1 INJECTION, SOLUTION SUBCUTANEOUS ONCE
Refills: 0 | Status: DISCONTINUED | OUTPATIENT
Start: 2023-01-01 | End: 2023-01-01

## 2023-01-01 RX ORDER — VANCOMYCIN HCL 1 G
1000 VIAL (EA) INTRAVENOUS ONCE
Refills: 0 | Status: COMPLETED | OUTPATIENT
Start: 2023-01-01 | End: 2023-01-01

## 2023-01-01 RX ORDER — IPRATROPIUM/ALBUTEROL SULFATE 18-103MCG
3 AEROSOL WITH ADAPTER (GRAM) INHALATION EVERY 6 HOURS
Refills: 0 | Status: DISCONTINUED | OUTPATIENT
Start: 2023-01-01 | End: 2023-01-01

## 2023-01-01 RX ORDER — MORPHINE SULFATE 50 MG/1
1 CAPSULE, EXTENDED RELEASE ORAL EVERY 4 HOURS
Refills: 0 | Status: DISCONTINUED | OUTPATIENT
Start: 2023-01-01 | End: 2023-01-01

## 2023-01-01 RX ORDER — SERTRALINE 25 MG/1
50 TABLET, FILM COATED ORAL DAILY
Refills: 0 | Status: DISCONTINUED | OUTPATIENT
Start: 2023-01-01 | End: 2023-01-01

## 2023-01-01 RX ORDER — ZINC OXIDE 200 MG/G
1 OINTMENT TOPICAL
Qty: 0 | Refills: 0 | DISCHARGE

## 2023-01-01 RX ORDER — PANTOPRAZOLE SODIUM 20 MG/1
80 TABLET, DELAYED RELEASE ORAL ONCE
Refills: 0 | Status: COMPLETED | OUTPATIENT
Start: 2023-01-01 | End: 2023-01-01

## 2023-01-01 RX ORDER — METOCLOPRAMIDE HCL 10 MG
5 TABLET ORAL ONCE
Refills: 0 | Status: COMPLETED | OUTPATIENT
Start: 2023-01-01 | End: 2023-01-01

## 2023-01-01 RX ORDER — VANCOMYCIN HCL 1 G
1000 VIAL (EA) INTRAVENOUS
Refills: 0 | Status: DISCONTINUED | OUTPATIENT
Start: 2023-01-01 | End: 2023-01-01

## 2023-01-01 RX ORDER — PROCHLORPERAZINE MALEATE 5 MG
10 TABLET ORAL EVERY 8 HOURS
Refills: 0 | Status: DISCONTINUED | OUTPATIENT
Start: 2023-01-01 | End: 2023-01-01

## 2023-01-01 RX ORDER — CHLORHEXIDINE GLUCONATE 213 G/1000ML
1 SOLUTION TOPICAL
Refills: 0 | Status: DISCONTINUED | OUTPATIENT
Start: 2023-01-01 | End: 2023-01-01

## 2023-01-01 RX ORDER — SERTRALINE 25 MG/1
1 TABLET, FILM COATED ORAL
Qty: 0 | Refills: 0 | DISCHARGE

## 2023-01-01 RX ORDER — INSULIN GLARGINE 100 [IU]/ML
15 INJECTION, SOLUTION SUBCUTANEOUS AT BEDTIME
Refills: 0 | Status: DISCONTINUED | OUTPATIENT
Start: 2023-01-01 | End: 2023-01-01

## 2023-01-01 RX ORDER — AZITHROMYCIN 500 MG/1
TABLET, FILM COATED ORAL
Refills: 0 | Status: DISCONTINUED | OUTPATIENT
Start: 2023-01-01 | End: 2023-01-01

## 2023-01-01 RX ORDER — ONDANSETRON 8 MG/1
4 TABLET, FILM COATED ORAL EVERY 6 HOURS
Refills: 0 | Status: DISCONTINUED | OUTPATIENT
Start: 2023-01-01 | End: 2023-01-01

## 2023-01-01 RX ORDER — METOCLOPRAMIDE HCL 10 MG
10 TABLET ORAL THREE TIMES A DAY
Refills: 0 | Status: DISCONTINUED | OUTPATIENT
Start: 2023-01-01 | End: 2023-01-01

## 2023-01-01 RX ORDER — MAGNESIUM SULFATE 500 MG/ML
2 VIAL (ML) INJECTION ONCE
Refills: 0 | Status: COMPLETED | OUTPATIENT
Start: 2023-01-01 | End: 2023-01-01

## 2023-01-01 RX ORDER — INSULIN ASPART 100 [IU]/ML
0 INJECTION, SOLUTION SUBCUTANEOUS
Qty: 0 | Refills: 0 | DISCHARGE

## 2023-01-01 RX ADMIN — BENZOCAINE AND MENTHOL 1 LOZENGE: 5; 1 LIQUID ORAL at 10:45

## 2023-01-01 RX ADMIN — ONDANSETRON 4 MILLIGRAM(S): 8 TABLET, FILM COATED ORAL at 13:45

## 2023-01-01 RX ADMIN — Medication 100 MILLIGRAM(S): at 15:47

## 2023-01-01 RX ADMIN — Medication 400 MILLIGRAM(S): at 10:21

## 2023-01-01 RX ADMIN — NYSTATIN CREAM 1 APPLICATION(S): 100000 CREAM TOPICAL at 17:11

## 2023-01-01 RX ADMIN — Medication 10 MILLIGRAM(S): at 14:40

## 2023-01-01 RX ADMIN — Medication 100 MICROGRAM(S): at 21:34

## 2023-01-01 RX ADMIN — Medication 2 UNIT(S): at 10:47

## 2023-01-01 RX ADMIN — PIPERACILLIN AND TAZOBACTAM 25 GRAM(S): 4; .5 INJECTION, POWDER, LYOPHILIZED, FOR SOLUTION INTRAVENOUS at 17:59

## 2023-01-01 RX ADMIN — MORPHINE SULFATE 1 MILLIGRAM(S): 50 CAPSULE, EXTENDED RELEASE ORAL at 19:18

## 2023-01-01 RX ADMIN — Medication 4: at 23:34

## 2023-01-01 RX ADMIN — AZITHROMYCIN 255 MILLIGRAM(S): 500 TABLET, FILM COATED ORAL at 20:22

## 2023-01-01 RX ADMIN — PIPERACILLIN AND TAZOBACTAM 25 GRAM(S): 4; .5 INJECTION, POWDER, LYOPHILIZED, FOR SOLUTION INTRAVENOUS at 17:04

## 2023-01-01 RX ADMIN — Medication 5 MILLIGRAM(S): at 21:34

## 2023-01-01 RX ADMIN — Medication 250 MILLIGRAM(S): at 12:01

## 2023-01-01 RX ADMIN — PIPERACILLIN AND TAZOBACTAM 25 GRAM(S): 4; .5 INJECTION, POWDER, LYOPHILIZED, FOR SOLUTION INTRAVENOUS at 18:03

## 2023-01-01 RX ADMIN — SERTRALINE 50 MILLIGRAM(S): 25 TABLET, FILM COATED ORAL at 17:34

## 2023-01-01 RX ADMIN — NYSTATIN CREAM 1 APPLICATION(S): 100000 CREAM TOPICAL at 17:59

## 2023-01-01 RX ADMIN — Medication 250 MILLIGRAM(S): at 10:51

## 2023-01-01 RX ADMIN — Medication 5 MILLIGRAM(S): at 22:23

## 2023-01-01 RX ADMIN — Medication 10 MILLIGRAM(S): at 06:23

## 2023-01-01 RX ADMIN — MIDODRINE HYDROCHLORIDE 10 MILLIGRAM(S): 2.5 TABLET ORAL at 21:14

## 2023-01-01 RX ADMIN — MORPHINE SULFATE 1 MILLIGRAM(S): 50 CAPSULE, EXTENDED RELEASE ORAL at 14:38

## 2023-01-01 RX ADMIN — IRON SUCROSE 210 MILLIGRAM(S): 20 INJECTION, SOLUTION INTRAVENOUS at 15:20

## 2023-01-01 RX ADMIN — MIDODRINE HYDROCHLORIDE 10 MILLIGRAM(S): 2.5 TABLET ORAL at 21:32

## 2023-01-01 RX ADMIN — PIPERACILLIN AND TAZOBACTAM 200 GRAM(S): 4; .5 INJECTION, POWDER, LYOPHILIZED, FOR SOLUTION INTRAVENOUS at 10:10

## 2023-01-01 RX ADMIN — Medication 3 MILLILITER(S): at 20:20

## 2023-01-01 RX ADMIN — SODIUM ZIRCONIUM CYCLOSILICATE 10 GRAM(S): 10 POWDER, FOR SUSPENSION ORAL at 21:15

## 2023-01-01 RX ADMIN — Medication 3 MILLILITER(S): at 00:53

## 2023-01-01 RX ADMIN — Medication 50 MILLILITER(S): at 13:15

## 2023-01-01 RX ADMIN — MIDODRINE HYDROCHLORIDE 10 MILLIGRAM(S): 2.5 TABLET ORAL at 05:03

## 2023-01-01 RX ADMIN — Medication 40 MILLIGRAM(S): at 12:32

## 2023-01-01 RX ADMIN — MIDODRINE HYDROCHLORIDE 10 MILLIGRAM(S): 2.5 TABLET ORAL at 22:20

## 2023-01-01 RX ADMIN — ONDANSETRON 4 MILLIGRAM(S): 8 TABLET, FILM COATED ORAL at 21:19

## 2023-01-01 RX ADMIN — MIDODRINE HYDROCHLORIDE 10 MILLIGRAM(S): 2.5 TABLET ORAL at 14:48

## 2023-01-01 RX ADMIN — SODIUM CHLORIDE 1000 MILLILITER(S): 9 INJECTION, SOLUTION INTRAVENOUS at 00:56

## 2023-01-01 RX ADMIN — PANTOPRAZOLE SODIUM 10 MG/HR: 20 TABLET, DELAYED RELEASE ORAL at 13:45

## 2023-01-01 RX ADMIN — PIPERACILLIN AND TAZOBACTAM 25 GRAM(S): 4; .5 INJECTION, POWDER, LYOPHILIZED, FOR SOLUTION INTRAVENOUS at 17:11

## 2023-01-01 RX ADMIN — CEFTRIAXONE 1000 MILLIGRAM(S): 500 INJECTION, POWDER, FOR SOLUTION INTRAMUSCULAR; INTRAVENOUS at 02:12

## 2023-01-01 RX ADMIN — CHLORHEXIDINE GLUCONATE 1 APPLICATION(S): 213 SOLUTION TOPICAL at 10:22

## 2023-01-01 RX ADMIN — MORPHINE SULFATE 1 MILLIGRAM(S): 50 CAPSULE, EXTENDED RELEASE ORAL at 14:21

## 2023-01-01 RX ADMIN — Medication 4 MILLILITER(S): at 03:35

## 2023-01-01 RX ADMIN — PANTOPRAZOLE SODIUM 80 MILLIGRAM(S): 20 TABLET, DELAYED RELEASE ORAL at 10:58

## 2023-01-01 RX ADMIN — PIPERACILLIN AND TAZOBACTAM 25 GRAM(S): 4; .5 INJECTION, POWDER, LYOPHILIZED, FOR SOLUTION INTRAVENOUS at 05:02

## 2023-01-01 RX ADMIN — ALBUTEROL 10 MILLIGRAM(S): 90 AEROSOL, METERED ORAL at 11:26

## 2023-01-01 RX ADMIN — Medication 4: at 11:31

## 2023-01-01 RX ADMIN — Medication 4 MILLILITER(S): at 12:54

## 2023-01-01 RX ADMIN — MIDODRINE HYDROCHLORIDE 10 MILLIGRAM(S): 2.5 TABLET ORAL at 21:15

## 2023-01-01 RX ADMIN — Medication 400 MILLIGRAM(S): at 22:16

## 2023-01-01 RX ADMIN — SODIUM CHLORIDE 1000 MILLILITER(S): 9 INJECTION, SOLUTION INTRAVENOUS at 10:37

## 2023-01-01 RX ADMIN — Medication 125 MICROGRAM(S): at 22:23

## 2023-01-01 RX ADMIN — Medication 8.93 MICROGRAM(S)/KG/MIN: at 00:44

## 2023-01-01 RX ADMIN — Medication 6: at 11:57

## 2023-01-01 RX ADMIN — PANTOPRAZOLE SODIUM 40 MILLIGRAM(S): 20 TABLET, DELAYED RELEASE ORAL at 17:29

## 2023-01-01 RX ADMIN — Medication 125 MICROGRAM(S): at 23:52

## 2023-01-01 RX ADMIN — Medication 4 MILLILITER(S): at 18:01

## 2023-01-01 RX ADMIN — CHLORHEXIDINE GLUCONATE 1 APPLICATION(S): 213 SOLUTION TOPICAL at 06:10

## 2023-01-01 RX ADMIN — MIDODRINE HYDROCHLORIDE 10 MILLIGRAM(S): 2.5 TABLET ORAL at 10:45

## 2023-01-01 RX ADMIN — PANTOPRAZOLE SODIUM 10 MG/HR: 20 TABLET, DELAYED RELEASE ORAL at 18:33

## 2023-01-01 RX ADMIN — Medication 1000 MILLIGRAM(S): at 01:29

## 2023-01-01 RX ADMIN — BENZOCAINE AND MENTHOL 1 LOZENGE: 5; 1 LIQUID ORAL at 03:11

## 2023-01-01 RX ADMIN — Medication 40 MILLIGRAM(S): at 14:20

## 2023-01-01 RX ADMIN — Medication 3 MILLILITER(S): at 08:23

## 2023-01-01 RX ADMIN — Medication 100 MICROGRAM(S): at 21:31

## 2023-01-01 RX ADMIN — INSULIN HUMAN 10 UNIT(S): 100 INJECTION, SOLUTION SUBCUTANEOUS at 13:14

## 2023-01-01 RX ADMIN — Medication 80 MILLIGRAM(S): at 18:28

## 2023-01-01 RX ADMIN — Medication 4: at 05:28

## 2023-01-01 RX ADMIN — BENZOCAINE AND MENTHOL 1 LOZENGE: 5; 1 LIQUID ORAL at 14:19

## 2023-01-01 RX ADMIN — Medication 1000 MILLIGRAM(S): at 10:21

## 2023-01-01 RX ADMIN — Medication 600 MILLIGRAM(S): at 03:11

## 2023-01-01 RX ADMIN — Medication 40 MILLIGRAM(S): at 13:49

## 2023-01-01 RX ADMIN — ONDANSETRON 4 MILLIGRAM(S): 8 TABLET, FILM COATED ORAL at 22:46

## 2023-01-01 RX ADMIN — Medication 80 MILLIGRAM(S): at 10:30

## 2023-01-01 RX ADMIN — IRON SUCROSE 210 MILLIGRAM(S): 20 INJECTION, SOLUTION INTRAVENOUS at 15:15

## 2023-01-01 RX ADMIN — PANTOPRAZOLE SODIUM 10 MG/HR: 20 TABLET, DELAYED RELEASE ORAL at 21:14

## 2023-01-01 RX ADMIN — ONDANSETRON 4 MILLIGRAM(S): 8 TABLET, FILM COATED ORAL at 07:57

## 2023-01-01 RX ADMIN — Medication 3 MILLILITER(S): at 15:47

## 2023-01-01 RX ADMIN — Medication 3 MILLILITER(S): at 15:48

## 2023-01-01 RX ADMIN — PIPERACILLIN AND TAZOBACTAM 25 GRAM(S): 4; .5 INJECTION, POWDER, LYOPHILIZED, FOR SOLUTION INTRAVENOUS at 05:24

## 2023-01-01 RX ADMIN — Medication 8: at 12:30

## 2023-01-01 RX ADMIN — FENTANYL CITRATE 25 MICROGRAM(S): 50 INJECTION INTRAVENOUS at 11:36

## 2023-01-01 RX ADMIN — CHLORHEXIDINE GLUCONATE 1 APPLICATION(S): 213 SOLUTION TOPICAL at 18:37

## 2023-01-01 RX ADMIN — Medication 5 MILLIGRAM(S): at 01:49

## 2023-01-01 RX ADMIN — Medication 250 MILLIGRAM(S): at 08:00

## 2023-01-01 RX ADMIN — PANTOPRAZOLE SODIUM 10 MG/HR: 20 TABLET, DELAYED RELEASE ORAL at 23:00

## 2023-01-01 RX ADMIN — MIDODRINE HYDROCHLORIDE 10 MILLIGRAM(S): 2.5 TABLET ORAL at 14:26

## 2023-01-01 RX ADMIN — CEFTRIAXONE 1000 MILLIGRAM(S): 500 INJECTION, POWDER, FOR SOLUTION INTRAMUSCULAR; INTRAVENOUS at 19:31

## 2023-01-01 RX ADMIN — SODIUM CHLORIDE 1000 MILLILITER(S): 9 INJECTION, SOLUTION INTRAVENOUS at 09:40

## 2023-01-01 RX ADMIN — INSULIN GLARGINE 15 UNIT(S): 100 INJECTION, SOLUTION SUBCUTANEOUS at 22:51

## 2023-01-01 RX ADMIN — Medication 4: at 18:28

## 2023-01-01 RX ADMIN — Medication 1000 MILLIGRAM(S): at 21:05

## 2023-01-01 RX ADMIN — BENZOCAINE AND MENTHOL 1 LOZENGE: 5; 1 LIQUID ORAL at 21:26

## 2023-01-01 RX ADMIN — Medication 80 MILLIGRAM(S): at 08:47

## 2023-01-01 RX ADMIN — Medication 50 MILLIGRAM(S): at 21:16

## 2023-01-01 RX ADMIN — Medication 3 MILLILITER(S): at 20:29

## 2023-01-01 RX ADMIN — Medication 3 MILLILITER(S): at 08:46

## 2023-01-01 RX ADMIN — Medication 4: at 05:57

## 2023-01-01 RX ADMIN — Medication 12.5 MILLIGRAM(S): at 18:03

## 2023-01-01 RX ADMIN — SERTRALINE 50 MILLIGRAM(S): 25 TABLET, FILM COATED ORAL at 15:23

## 2023-01-01 RX ADMIN — Medication 400 MILLIGRAM(S): at 23:18

## 2023-01-01 RX ADMIN — PIPERACILLIN AND TAZOBACTAM 25 GRAM(S): 4; .5 INJECTION, POWDER, LYOPHILIZED, FOR SOLUTION INTRAVENOUS at 05:40

## 2023-01-01 RX ADMIN — SODIUM CHLORIDE 1000 MILLILITER(S): 9 INJECTION, SOLUTION INTRAVENOUS at 21:33

## 2023-01-01 RX ADMIN — QUETIAPINE FUMARATE 12.5 MILLIGRAM(S): 200 TABLET, FILM COATED ORAL at 22:50

## 2023-01-01 RX ADMIN — NYSTATIN CREAM 1 APPLICATION(S): 100000 CREAM TOPICAL at 05:24

## 2023-01-01 RX ADMIN — PIPERACILLIN AND TAZOBACTAM 25 GRAM(S): 4; .5 INJECTION, POWDER, LYOPHILIZED, FOR SOLUTION INTRAVENOUS at 05:05

## 2023-01-01 RX ADMIN — NYSTATIN CREAM 1 APPLICATION(S): 100000 CREAM TOPICAL at 05:46

## 2023-01-01 RX ADMIN — INSULIN GLARGINE 20 UNIT(S): 100 INJECTION, SOLUTION SUBCUTANEOUS at 22:33

## 2023-01-01 RX ADMIN — SODIUM ZIRCONIUM CYCLOSILICATE 10 GRAM(S): 10 POWDER, FOR SUSPENSION ORAL at 18:36

## 2023-01-01 RX ADMIN — NYSTATIN CREAM 1 APPLICATION(S): 100000 CREAM TOPICAL at 18:27

## 2023-01-01 RX ADMIN — Medication 600 MILLIGRAM(S): at 14:19

## 2023-01-01 RX ADMIN — Medication 400 MILLIGRAM(S): at 20:40

## 2023-01-01 RX ADMIN — Medication 4: at 11:01

## 2023-01-01 RX ADMIN — Medication 3 MILLILITER(S): at 06:54

## 2023-01-01 RX ADMIN — SODIUM CHLORIDE 1000 MILLILITER(S): 9 INJECTION, SOLUTION INTRAVENOUS at 01:49

## 2023-01-01 RX ADMIN — Medication 3 MILLILITER(S): at 03:11

## 2023-01-01 RX ADMIN — Medication 400 MILLIGRAM(S): at 03:42

## 2023-01-01 RX ADMIN — FENTANYL CITRATE 25 MICROGRAM(S): 50 INJECTION INTRAVENOUS at 11:46

## 2023-01-01 RX ADMIN — IRON SUCROSE 210 MILLIGRAM(S): 20 INJECTION, SOLUTION INTRAVENOUS at 15:43

## 2023-01-01 RX ADMIN — PHENYLEPHRINE HYDROCHLORIDE 17.9 MICROGRAM(S)/KG/MIN: 10 INJECTION INTRAVENOUS at 02:18

## 2023-01-01 RX ADMIN — Medication 125 MILLILITER(S): at 11:06

## 2023-01-01 RX ADMIN — ONDANSETRON 4 MILLIGRAM(S): 8 TABLET, FILM COATED ORAL at 00:55

## 2023-01-01 RX ADMIN — Medication 8.93 MICROGRAM(S)/KG/MIN: at 00:57

## 2023-01-01 RX ADMIN — MIDODRINE HYDROCHLORIDE 10 MILLIGRAM(S): 2.5 TABLET ORAL at 23:17

## 2023-01-01 RX ADMIN — PHENYLEPHRINE HYDROCHLORIDE 17.9 MICROGRAM(S)/KG/MIN: 10 INJECTION INTRAVENOUS at 00:57

## 2023-01-01 RX ADMIN — CHLORHEXIDINE GLUCONATE 1 APPLICATION(S): 213 SOLUTION TOPICAL at 05:41

## 2023-01-01 RX ADMIN — Medication 12.5 MILLIGRAM(S): at 05:06

## 2023-01-01 RX ADMIN — Medication 3 MILLILITER(S): at 08:37

## 2023-01-01 RX ADMIN — Medication 3 MILLILITER(S): at 15:46

## 2023-01-01 RX ADMIN — SODIUM ZIRCONIUM CYCLOSILICATE 10 GRAM(S): 10 POWDER, FOR SUSPENSION ORAL at 05:03

## 2023-01-01 RX ADMIN — MIDODRINE HYDROCHLORIDE 10 MILLIGRAM(S): 2.5 TABLET ORAL at 05:47

## 2023-01-01 RX ADMIN — MIDODRINE HYDROCHLORIDE 10 MILLIGRAM(S): 2.5 TABLET ORAL at 15:10

## 2023-01-01 RX ADMIN — IRON SUCROSE 210 MILLIGRAM(S): 20 INJECTION, SOLUTION INTRAVENOUS at 18:24

## 2023-01-01 RX ADMIN — ONDANSETRON 4 MILLIGRAM(S): 8 TABLET, FILM COATED ORAL at 05:06

## 2023-01-01 RX ADMIN — NYSTATIN CREAM 1 APPLICATION(S): 100000 CREAM TOPICAL at 18:04

## 2023-01-01 RX ADMIN — Medication 3 MILLILITER(S): at 18:01

## 2023-01-01 RX ADMIN — Medication 50 MILLIGRAM(S): at 22:22

## 2023-01-01 RX ADMIN — Medication 4: at 01:36

## 2023-01-01 RX ADMIN — Medication 25 GRAM(S): at 00:56

## 2023-01-01 RX ADMIN — Medication 3 MILLILITER(S): at 21:23

## 2023-01-01 RX ADMIN — Medication 6: at 18:35

## 2023-01-01 RX ADMIN — Medication 100 MICROGRAM(S): at 21:54

## 2023-01-01 RX ADMIN — PANTOPRAZOLE SODIUM 10 MG/HR: 20 TABLET, DELAYED RELEASE ORAL at 09:52

## 2023-01-01 RX ADMIN — INSULIN HUMAN 10 UNIT(S): 100 INJECTION, SOLUTION SUBCUTANEOUS at 11:26

## 2023-01-01 RX ADMIN — Medication 125 MICROGRAM(S): at 21:16

## 2023-01-01 RX ADMIN — PANTOPRAZOLE SODIUM 40 MILLIGRAM(S): 20 TABLET, DELAYED RELEASE ORAL at 17:15

## 2023-01-01 RX ADMIN — Medication 4: at 10:48

## 2023-01-01 RX ADMIN — IRON SUCROSE 210 MILLIGRAM(S): 20 INJECTION, SOLUTION INTRAVENOUS at 17:16

## 2023-01-01 RX ADMIN — CHLORHEXIDINE GLUCONATE 1 APPLICATION(S): 213 SOLUTION TOPICAL at 05:02

## 2023-01-01 RX ADMIN — PANTOPRAZOLE SODIUM 40 MILLIGRAM(S): 20 TABLET, DELAYED RELEASE ORAL at 05:45

## 2023-01-01 RX ADMIN — Medication 5 MILLILITER(S): at 18:23

## 2023-01-01 RX ADMIN — Medication 50 MILLIGRAM(S): at 21:41

## 2023-01-01 RX ADMIN — Medication 100 MILLIEQUIVALENT(S): at 12:25

## 2023-01-01 RX ADMIN — PHENYLEPHRINE HYDROCHLORIDE 17.9 MICROGRAM(S)/KG/MIN: 10 INJECTION INTRAVENOUS at 05:40

## 2023-01-01 RX ADMIN — Medication 5 MILLIGRAM(S): at 03:54

## 2023-01-01 RX ADMIN — Medication 400 MILLIGRAM(S): at 00:56

## 2023-01-01 RX ADMIN — Medication 10 MILLIGRAM(S): at 23:25

## 2023-01-01 RX ADMIN — Medication 5 MILLIGRAM(S): at 09:41

## 2023-01-01 RX ADMIN — ONDANSETRON 4 MILLIGRAM(S): 8 TABLET, FILM COATED ORAL at 12:20

## 2023-01-01 RX ADMIN — PIPERACILLIN AND TAZOBACTAM 25 GRAM(S): 4; .5 INJECTION, POWDER, LYOPHILIZED, FOR SOLUTION INTRAVENOUS at 17:28

## 2023-01-01 RX ADMIN — Medication 10 MILLIGRAM(S): at 14:48

## 2023-01-01 RX ADMIN — QUETIAPINE FUMARATE 12.5 MILLIGRAM(S): 200 TABLET, FILM COATED ORAL at 03:23

## 2023-01-01 RX ADMIN — Medication 50 MILLIGRAM(S): at 23:52

## 2023-01-01 RX ADMIN — Medication 3 MILLILITER(S): at 03:35

## 2023-01-01 RX ADMIN — QUETIAPINE FUMARATE 12.5 MILLIGRAM(S): 200 TABLET, FILM COATED ORAL at 22:16

## 2023-01-01 RX ADMIN — PANTOPRAZOLE SODIUM 40 MILLIGRAM(S): 20 TABLET, DELAYED RELEASE ORAL at 18:00

## 2023-01-01 RX ADMIN — AZITHROMYCIN 255 MILLIGRAM(S): 500 TABLET, FILM COATED ORAL at 17:31

## 2023-01-01 RX ADMIN — NYSTATIN CREAM 1 APPLICATION(S): 100000 CREAM TOPICAL at 05:06

## 2023-01-01 RX ADMIN — PANTOPRAZOLE SODIUM 10 MG/HR: 20 TABLET, DELAYED RELEASE ORAL at 05:40

## 2023-01-01 RX ADMIN — Medication 1000 MILLIGRAM(S): at 04:00

## 2023-01-01 RX ADMIN — Medication 5 MILLILITER(S): at 08:58

## 2023-01-01 RX ADMIN — ZOLPIDEM TARTRATE 5 MILLIGRAM(S): 10 TABLET ORAL at 23:53

## 2023-01-01 RX ADMIN — Medication 4: at 23:53

## 2023-01-01 RX ADMIN — Medication 200 GRAM(S): at 11:26

## 2023-01-01 RX ADMIN — MORPHINE SULFATE 1 MILLIGRAM(S): 50 CAPSULE, EXTENDED RELEASE ORAL at 18:33

## 2023-01-01 RX ADMIN — Medication 1000 MILLIGRAM(S): at 22:46

## 2023-01-01 RX ADMIN — BENZOCAINE AND MENTHOL 1 LOZENGE: 5; 1 LIQUID ORAL at 03:14

## 2023-01-01 RX ADMIN — SODIUM CHLORIDE 1000 MILLILITER(S): 9 INJECTION, SOLUTION INTRAVENOUS at 02:11

## 2023-01-01 RX ADMIN — PANTOPRAZOLE SODIUM 10 MG/HR: 20 TABLET, DELAYED RELEASE ORAL at 10:34

## 2023-01-01 RX ADMIN — CEFTRIAXONE 1000 MILLIGRAM(S): 500 INJECTION, POWDER, FOR SOLUTION INTRAMUSCULAR; INTRAVENOUS at 12:00

## 2023-01-01 RX ADMIN — PANTOPRAZOLE SODIUM 10 MG/HR: 20 TABLET, DELAYED RELEASE ORAL at 02:55

## 2023-01-01 RX ADMIN — PIPERACILLIN AND TAZOBACTAM 25 GRAM(S): 4; .5 INJECTION, POWDER, LYOPHILIZED, FOR SOLUTION INTRAVENOUS at 18:27

## 2023-01-01 RX ADMIN — Medication 50 MILLILITER(S): at 11:33

## 2023-01-01 RX ADMIN — PIPERACILLIN AND TAZOBACTAM 25 GRAM(S): 4; .5 INJECTION, POWDER, LYOPHILIZED, FOR SOLUTION INTRAVENOUS at 05:45

## 2023-01-01 RX ADMIN — SERTRALINE 50 MILLIGRAM(S): 25 TABLET, FILM COATED ORAL at 12:16

## 2023-01-01 RX ADMIN — Medication 5 MILLIGRAM(S): at 23:43

## 2023-01-01 RX ADMIN — INSULIN GLARGINE 15 UNIT(S): 100 INJECTION, SOLUTION SUBCUTANEOUS at 21:19

## 2023-01-01 RX ADMIN — CHLORHEXIDINE GLUCONATE 1 APPLICATION(S): 213 SOLUTION TOPICAL at 05:25

## 2023-01-01 RX ADMIN — SERTRALINE 50 MILLIGRAM(S): 25 TABLET, FILM COATED ORAL at 12:55

## 2023-01-01 RX ADMIN — SERTRALINE 50 MILLIGRAM(S): 25 TABLET, FILM COATED ORAL at 18:04

## 2023-01-01 RX ADMIN — Medication 1000 MILLIGRAM(S): at 00:00

## 2023-01-01 RX ADMIN — PANTOPRAZOLE SODIUM 40 MILLIGRAM(S): 20 TABLET, DELAYED RELEASE ORAL at 05:05

## 2023-01-01 RX ADMIN — BENZOCAINE AND MENTHOL 1 LOZENGE: 5; 1 LIQUID ORAL at 21:43

## 2023-01-01 RX ADMIN — Medication 125 MICROGRAM(S): at 21:40

## 2023-01-28 NOTE — PATIENT PROFILE ADULT - FALL HARM RISK - RISK INTERVENTIONS

## 2023-01-28 NOTE — CONSULT NOTE ADULT - CRITICAL CARE ATTENDING COMMENT
Impression:   88F with multiple comorbidities including CAD, ?CHF, HTN, Dyslipidemia, recently treated for bronchitis BIBEMS with worsening of SOB for past 3 days. GI is consulted for sever anemia and melena. Labs revealed Hb if 6.7, Lactate of 4.9 and potassium of 6.8. She is being resucitated actively in ED.       Recommendations:   NPO, IV fluid, Transfuse PRBC  IV PPI bolus followed by drip   She requires correction of potassium and transfusion of the PRBC to bring Hb above 7mg/dl atleast.   Continue to hold plavix.   Will plan for EGD after she is adequately resuscitated, tentatively tomorrow morning, unless she has persistent ongoing GI bleeding, that case will do EGD overnight.   Primary team to optimize cardiopulmonary status.   Monitor Hb, eledctrolytes and lactate.   GI will follow closely

## 2023-01-28 NOTE — ED ADULT NURSE NOTE - NSIMPLEMENTINTERV_GEN_ALL_ED
Implemented All Fall Risk Interventions:  [place] to call system. Call bell, personal items and telephone within reach. Instruct patient to call for assistance. Room bathroom lighting operational. Non-slip footwear when patient is off stretcher. Physically safe environment: no spills, clutter or unnecessary equipment. Stretcher in lowest position, wheels locked, appropriate side rails in place. Provide visual cue, wrist band, yellow gown, etc. Monitor gait and stability. Monitor for mental status changes and reorient to person, place, and time. Review medications for side effects contributing to fall risk. Reinforce activity limits and safety measures with patient and family.

## 2023-01-28 NOTE — ED PROVIDER NOTE - OBJECTIVE STATEMENT
88-year-old female with a past medical history of coronary disease, hypertension, hyperlipidemia presents with shortness of breath and generalized weakness.  Patient states she was recently treated for bronchitis and for the past few days with worsening shortness of breath and generalized weakness.  Patient also reports dark stools. Pt denies fevers/chills, ha, loc, focal neuro deficits, cp/palp, cough, abd pain/n/v/d, urinary symptoms, recent travel

## 2023-01-28 NOTE — H&P ADULT - ATTENDING COMMENTS
88F w/ hx of CAD s/p DAPT, HTN, DM, hx of aortic stenosis s/p AVR, HLD, hypothyroidism presented with shortness of breath x 1 day and blood bowel movements. Pt reports recent chest congestion and was treated with z pack for suspected 88F w/ hx of CAD s/p DAPT, HTN, DM, hx of aortic stenosis s/p AVR, HLD, hypothyroidism, CKD (baseline Cr ~2.5) presented with shortness of breath x 1 day and blood bowel movements. Pt reports recent chest congestion and was treated with z pack for suspected bronchitis. Per son, pt has had ongoing episodes of bloody stools for the last few weeks. No prior episodes of GI bleed and no prior endoscopy.  Pt denies any chronic NSAID use and is not on anticoagulation but is on DAPT. Pt was hypotensive on presentation but improved with fluid resuscitation. Found to have Hgb 6.7 down from 12-13 and hyperkalemia to 6.8. Pt reportedly had melena in the ED. Seen by GI with plan for EGD tomorrow. Pt being tranfused 2u pRBC. Follow serial CBC. Pt received hyperkalemia protocol in ED. Admitted to MICU given recurrent hypotension.     Acute blood loss anemia in the setting of GI bleed   Hypotension secondary to blood loss   Hyperkalemia   Possible PNA  Hyperglycemia     tranfused 2u pRBC; f/u repeat CBC   trend CBC q4-6h   c/w PPI drip   GI to scope in AM   hold ASA/Plavix   trend lactate     s/p hyperkalemia protocol   trend chem 8 q4-6   thomas placed for strict I/Os     DM   FS q6h   RAISS   f/u A1c     will continue with ceftriaxone/azithro given hypothermia/L sided consolidation   f/u urinary Ags   f/u procal   chest PT   supplemental O2 for goal >90-92%     DNR/DNI   PPI gtt   SCD

## 2023-01-28 NOTE — ED PROVIDER NOTE - PHYSICAL EXAMINATION
Const: Awake, alert and oriented. In no acute distress. Well appearing.  HEENT: NC/AT. Moist mucous membranes.   Eyes: No scleral icterus. EOMI. pale conjunctiva  Neck:. Soft and supple. Full ROM without pain.  Cardiac: Regular rate and regular rhythm. +S1/S2. Peripheral pulses 2+ and symmetric.   Resp: Speaking in full sentences. No evidence of respiratory distress. No wheezes, rales or rhonchi.  Abd: Soft, non-tender, non-distended. Normal bowel sounds in all 4 quadrants. No guarding or rebound.  Back: Spine midline and non-tender. No CVAT.  Skin: Pale, stage 2 ulcer to the lower buttocks  Lymph: No cervical lymphadenopathy.  Neuro: Awake, alert & oriented x 3. Moves all extremities symmetrically.

## 2023-01-28 NOTE — H&P ADULT - ASSESSMENT
Call rec from Toby, pts son, to discuss a conversation he had with Mercy Jeong. Mercy did contact me yesterday to ask about assistance for pts chemotherapy and Nuvigil. Pt is to start Gemzar in our office and Toby is asking about assistance for this. Pt currently has PAN-copay relief that expires 11/9/17 (we obtained this for her Xeloda). I did explain to Toby that we can apply to [facility] who has funding open for Breast and will cover the Gemzar medication. I explained to him that for the Nuvigil, I can send him the application from [employer] (manufacture as there are no foundations open for Nuvigil or the diagnosis associated with its use) and he can take this to Rosario Bell who has seen the pt and prescribed the medication. This application was mailed to pts home. I have applied to [facility] on pts behalf. Waiting on a decision from them.    89 y/o F hx of aortic valve repair, CAD on ASA/plavix, HTN, DM presented with shortness of breath for the past 1 day. endorsing bloody bowel movements for the "past several weeks."    GI bleed   Hemoglobin 6.8 on arrival, 2 units of PRBCs ordered. will transfuse and trend hemoglobin. baseline appears to be between 12.0-13.0. Goal hemoglobin >7.0.   NPO, endoscopy tomorrow per GI team for melena.   Protonix bolus given in the ER, will start protonix drip.  Hold ASA/plavix for endoscopy tomorrow     PNA  LLL PNA on chest xray with shortness of breath and recent episode of bronchitis like symptoms.   lactate downtrending to 2.80, fluid bolus given. will repeat s/p resuscitation.   rocephin and vancomycin given in the ER, likely community acquired pneumonia. will continue abx.   Patient saturating mid to high 90s on SPO2 on room air, weaned off nasal cannula. will monitor for oxygen requirement.     Hyperkalemia   K 6.8, known CKD, Cr 2.38 appears to be around her baseline of 2.0-3.0 range.   Place thomas catheter to monitor I&Os.   No acute ischemic changes seen on EKG.  Hyperkalemia treatment given by the ER - calcium, insulin, albuterol and dextrose. will recheck bmp for downtrending potassium.   Patient denies any active chest pain.     DNR/DNI per patient. son at bedside as well during encounter.

## 2023-01-28 NOTE — ED ADULT TRIAGE NOTE - CHIEF COMPLAINT QUOTE
increased SOB. recently diagnosed with bronchitis, finished PO abx, feels worse. on 2 LNC for comfort

## 2023-01-28 NOTE — ED PROVIDER NOTE - CLINICAL SUMMARY MEDICAL DECISION MAKING FREE TEXT BOX
88-year-old female with a past medical history of coronary disease, hypertension, hyperlipidemia presents with shortness of breath and generalized weakness.Patient found to have multiple episodes of melena here pale, pale conjunctiva found to be anemic hemoglobin 6.7 also with acute kidney injury and hyperkalemia.  Patient given Protonix transfuse blood also calcium gluconate, insulin, dextrose, albuterol.  Patient also found to have pneumonia and started on broad-spectrum antibiotics.  GI consulted for endoscopy which will be done once potassium normalizes and hemoglobin is greater than 7.  MICU consulted.  Also had goals of care discussion with patient and son at bedside patient states she is DNI DNR son agrees that patient has endorsed this in the past. 88-year-old female with a past medical history of coronary disease, hypertension, hyperlipidemia presents with shortness of breath and generalized weakness.Patient found to have multiple episodes of melena here pale, pale conjunctiva found to be anemic hemoglobin 6.7 also with acute kidney injury and hyperkalemia.  Patient given Protonix transfuse blood also calcium gluconate, insulin, dextrose, albuterol.  Patient also found to have pneumonia and started on broad-spectrum antibiotics.  GI consulted for endoscopy which will be done once potassium normalizes and hemoglobin is greater than 7.  MICU consulted.  Also had goals of care discussion with patient and son at bedside patient states she is DNI DNR son agrees that patient has endorsed this in the past. Admit to MICU for further management

## 2023-01-28 NOTE — H&P ADULT - HISTORY OF PRESENT ILLNESS
Patient is a 88y old  Female who presents with a chief complaint of shortness of breath     BRIEF HOSPITAL COURSE: 87 y/o F hx of aortic valve repair, CAD on ASA/plavix, HTN, DM presented with shortness of breath for the past 1 day. endorsing bloody bowel movements for the "past several weeks." Denies prior episodes of GI bleeding. was recently treated with z-pack for bronchitis per patient. denies prior colonoscopy/endoscopy in the past. Denies NSAID use.   In the ER, patient hypotensive with SBP in the 70s on arrival. Placed on 4L nasal cannula for shortness of breath and hypoxia by ER team. 1 unit of PRBC started aprox 1 hour ago, 2 units prbc in total. chest xray shows LLL PNA - ceftriaxone and vancomycin given. Potassium 6.8 w/ no acute ischemic changes on EKG. 10U IVP insulin, amp of d50, 2g calcium gluconate and albuterol given for hyperK treatment in the ER. guaiac positive, GI consulted and plan is for EGD tomorrow morning.       PAST MEDICAL & SURGICAL HISTORY:    Allergies    No Known Allergies    Intolerances          Review of Systems:  CONSTITUTIONAL: No fever, chills, or fatigue  EYES: No eye pain, visual disturbances, or discharge  ENMT:  No difficulty hearing, tinnitus, vertigo; No sinus or throat pain  NECK: No pain or stiffness  RESPIRATORY: No cough, wheezing, chills or hemoptysis; + shortness of breath  CARDIOVASCULAR: No chest pain, palpitations, dizziness, or leg swelling  GASTROINTESTINAL: Denies abdominal or epigastric pain. No nausea, vomiting, or hematemesis; No diarrhea or constipation. +melena or hematochezia.  GENITOURINARY: No dysuria, frequency, hematuria, or incontinence  NEUROLOGICAL: No headaches, memory loss, loss of strength, numbness, or tremors  SKIN: No itching, burning, rashes, or lesions   MUSCULOSKELETAL: No joint pain or swelling; No muscle, back, or extremity pain  PSYCHIATRIC: No depression, anxiety, mood swings, or difficulty sleeping      Medications:              pantoprazole Infusion 8 mG/Hr IV Continuous <Continuous>                      ICU Vital Signs Last 24 Hrs  T(C): 36.7 (28 Jan 2023 12:10), Max: 37.1 (28 Jan 2023 09:03)  T(F): 98 (28 Jan 2023 12:10), Max: 98.8 (28 Jan 2023 09:03)  HR: 82 (28 Jan 2023 13:55) (64 - 82)  BP: 85/42 (28 Jan 2023 13:55) (70/44 - 122/53)  BP(mean): --  ABP: --  ABP(mean): --  RR: 20 (28 Jan 2023 13:55) (18 - 24)  SpO2: 95% (28 Jan 2023 13:55) (93% - 100%)    O2 Parameters below as of 28 Jan 2023 13:55  Patient On (Oxygen Delivery Method): room air          Vital Signs Last 24 Hrs  T(C): 36.7 (28 Jan 2023 12:10), Max: 37.1 (28 Jan 2023 09:03)  T(F): 98 (28 Jan 2023 12:10), Max: 98.8 (28 Jan 2023 09:03)  HR: 82 (28 Jan 2023 13:55) (64 - 82)  BP: 85/42 (28 Jan 2023 13:55) (70/44 - 122/53)  BP(mean): --  RR: 20 (28 Jan 2023 13:55) (18 - 24)  SpO2: 95% (28 Jan 2023 13:55) (93% - 100%)    Parameters below as of 28 Jan 2023 13:55  Patient On (Oxygen Delivery Method): room air            I&O's Detail        LABS:                        6.7    8.79  )-----------( 188      ( 28 Jan 2023 10:08 )             22.3     01-28    140  |  104  |  82.6<H>  ----------------------------<  299<H>  6.8<HH>   |  22.0  |  2.38<H>    Ca    9.6      28 Jan 2023 10:08  Mg     2.1     01-28    TPro  4.6<L>  /  Alb  2.6<L>  /  TBili  <0.2<L>  /  DBili  x   /  AST  22  /  ALT  10  /  AlkPhos  70  01-28      CARDIAC MARKERS ( 28 Jan 2023 10:08 )  x     / <0.01 ng/mL / x     / x     / x          CAPILLARY BLOOD GLUCOSE        PT/INR - ( 28 Jan 2023 10:08 )   PT: 12.2 sec;   INR: 1.05 ratio         PTT - ( 28 Jan 2023 10:08 )  PTT:24.2 sec    CULTURES:      Physical Examination:    General: No acute distress.  Alert, oriented, interactive, nonfocal. pale appearing female.     HEENT: Pupils equal, reactive to light.  Symmetric.    PULM: Clear to auscultation bilaterally, no significant sputum production    CVS: Regular rate and rhythm, no murmurs, rubs, or gallops    ABD: Soft, nondistended, nontender, normoactive bowel sounds, no masses    EXT: No edema, nontender    SKIN: Warm and well perfused, no rashes noted.    CRITICAL CARE TIME SPENT: 30 minutes

## 2023-01-28 NOTE — ED ADULT NURSE NOTE - OBJECTIVE STATEMENT
Patient presented to ED complaining of SOB. Patient AXOX4, Hypotensive, hypoxic on 4LN/C sating @88% was sent to critical, recently diagnosed with broncitis and finished ABX and feels worse. RR even and unlabored. IV Patent. Blood cultures sent. Patient Afebrile rectally. Stage 2 noted on her perianal. Fluids in progress. Cxray completed. No distress noted at this time.

## 2023-01-28 NOTE — CONSULT NOTE ADULT - SUBJECTIVE AND OBJECTIVE BOX
HISTORY OF PRESENT ILLNESS: This is a 88y old woman with a past medical history significant for aortic valve repair, CAD on Plavix, HTN,. Dyslipidemia, Arthritis on percocet, recently she was managed for bacterial bronchitis prior to this visit with Abx. She was BIBEMS with SOB. Noted to be hypotensive. She was noted to have severe anemia with Hb of 6.7 and had multiple episode of formed, dark melanotic stool in ED. GI is asked to evaluate. Patient was in mild respiratory distress on venti mask at the time of evaluation. On LULI exam she had dark colored BM/Melanotic. Patient denies previous hx of GI bleeding. Patient reports using Ibuprofen 2 weeks ago however contradict the fact. Medication list from son reviewed and she supposed to be taking Plavix. Patient denies abdominal pain, nausea, vomiting, hematemesis and hematochezia. No hx of weight loss. Denies prior EGD and Colonoscopy.     ROS: A 14-point review of systems was completed and was otherwise negative save what was reported in the HPI.    PAST MEDICAL/SURGICAL HISTORY:    SOCIAL HISTORY:  - TOBACCO: Denies  - ALCOHOL: Denies  - ILLICIT DRUG USE: Denies    FAMILY HISTORY:  No known history of gastrointestinal or liver disease;      HOME MEDICATIONS:    INPATIENT MEDICATIONS:  MEDICATIONS  (STANDING):    MEDICATIONS  (PRN):    ALLERGIES:  No Known Allergies    T(C): 36.7 (01-28-23 @ 12:10), Max: 37.1 (01-28-23 @ 09:03)  HR: 67 (01-28-23 @ 12:10) (64 - 67)  BP: 97/53 (01-28-23 @ 12:10) (70/44 - 122/53)  RR: 20 (01-28-23 @ 12:10) (18 - 24)  SpO2: 97% (01-28-23 @ 12:10) (93% - 100%)      PHYSICAL EXAM:  Constitutional: Well-developed, well-nourished, in no apparent distress  Eyes: Sclerae anicteric, conjunctivae normal  ENMT: Mucus membranes moist, no oropharyngeal thrush noted  Neck: No thyroid nodules appreciated, no significant cervical or supraclavicular lymphadenopathy  Respiratory: Breathing nonlabored; clear to auscultation  Cardiovascular: Regular rate and rhythm  Gastrointestinal: Soft, nontender, nondistended, normoactive bowel sounds; no hepatosplenomegaly appreciated; no rebound tenderness or involuntary guarding  Rectal: Perianal exam within normal limits; normal sphincter tone; brown stool on glove  Extremities: No clubbing, cyanosis or edema  Neurological: Alert and oriented to person, place and time; no asterixis  Skin: No jaundice  Lymph Nodes: No significant lymphadenopathy  Musculoskeletal: No significant peripheral atrophy  Psychiatric: Affect and mood appropriate      LABS:             6.7    8.79  )-----------( 188      ( 01-28 @ 10:08 )             22.3       PT/INR - ( 28 Jan 2023 10:08 )   PT: 12.2 sec;   INR: 1.05 ratio         PTT - ( 28 Jan 2023 10:08 )  PTT:24.2 sec  01-28    140  |  104  |  82.6<H>  ----------------------------<  299<H>  6.8<HH>   |  22.0  |  2.38<H>    Ca    9.6      28 Jan 2023 10:08  Mg     2.1     01-28    TPro  4.6<L>  /  Alb  2.6<L>  /  TBili  <0.2<L>  /  DBili  x   /  AST  22  /  ALT  10  /  AlkPhos  70  01-28    LIVER FUNCTIONS - ( 28 Jan 2023 10:08 )  Alb: 2.6 g/dL / Pro: 4.6 g/dL / ALK PHOS: 70 U/L / ALT: 10 U/L / AST: 22 U/L / GGT: x                 MELD-Na  Dialysis at least twice in past week: Y/N?  Creatinine:  2.38  Total Bili:  <0.2  INR: 1.05  Sodium: 140        IMAGING: I personally reviewed the XXXX, and I agree with the radiologist's interpretation as described below:   HISTORY OF PRESENT ILLNESS: This is a 88y old woman with a past medical history significant for aortic valve repair, CAD on Plavix, HTN,. Dyslipidemia, Arthritis on percocet, recently she was managed for bacterial bronchitis prior to this visit with Abx. She was BIBEMS with SOB. Noted to be hypotensive. She was noted to have severe anemia with Hb of 6.7 and had multiple episode of formed, dark melanotic stool in ED. GI is asked to evaluate. Patient was in mild respiratory distress on venti mask at the time of evaluation. On LULI exam she had dark colored BM/Melanotic. Patient denies previous hx of GI bleeding. Patient reports using Ibuprofen 2 weeks ago however her son at bedside contradict the fact. Medication list from son reviewed and she supposed to be taking Plavix. Patient denies abdominal pain, nausea, vomiting, hematemesis and hematochezia. No hx of weight loss. Denies prior EGD and Colonoscopy.     ROS: A 14-point review of systems was completed and was otherwise negative save what was reported in the HPI.    PAST MEDICAL/SURGICAL HISTORY:    SOCIAL HISTORY:  - TOBACCO: Denies  - ALCOHOL: Denies  - ILLICIT DRUG USE: Denies    FAMILY HISTORY:  No known history of gastrointestinal or liver disease;      HOME MEDICATIONS:    INPATIENT MEDICATIONS:  MEDICATIONS  (STANDING):    MEDICATIONS  (PRN):    ALLERGIES:  No Known Allergies    T(C): 36.7 (01-28-23 @ 12:10), Max: 37.1 (01-28-23 @ 09:03)  HR: 67 (01-28-23 @ 12:10) (64 - 67)  BP: 97/53 (01-28-23 @ 12:10) (70/44 - 122/53)  RR: 20 (01-28-23 @ 12:10) (18 - 24)  SpO2: 97% (01-28-23 @ 12:10) (93% - 100%)      PHYSICAL EXAM:  Constitutional: Well-developed, well-nourished, in no apparent distress  Eyes: Sclerae anicteric, conjunctivae normal  ENMT: Mucus membranes moist, no oropharyngeal thrush noted  Neck: No thyroid nodules appreciated, no significant cervical or supraclavicular lymphadenopathy  Respiratory: Breathing nonlabored; clear to auscultation  Cardiovascular: Regular rate and rhythm  Gastrointestinal: Soft, nontender, nondistended, normoactive bowel sounds; no hepatosplenomegaly appreciated; no rebound tenderness or involuntary guarding  Rectal: Perianal exam within normal limits; normal sphincter tone; brown stool on glove  Extremities: No clubbing, cyanosis or edema  Neurological: Alert and oriented to person, place and time; no asterixis  Skin: No jaundice  Lymph Nodes: No significant lymphadenopathy  Musculoskeletal: No significant peripheral atrophy  Psychiatric: Affect and mood appropriate      LABS:             6.7    8.79  )-----------( 188      ( 01-28 @ 10:08 )             22.3       PT/INR - ( 28 Jan 2023 10:08 )   PT: 12.2 sec;   INR: 1.05 ratio         PTT - ( 28 Jan 2023 10:08 )  PTT:24.2 sec  01-28    140  |  104  |  82.6<H>  ----------------------------<  299<H>  6.8<HH>   |  22.0  |  2.38<H>    Ca    9.6      28 Jan 2023 10:08  Mg     2.1     01-28    TPro  4.6<L>  /  Alb  2.6<L>  /  TBili  <0.2<L>  /  DBili  x   /  AST  22  /  ALT  10  /  AlkPhos  70  01-28    LIVER FUNCTIONS - ( 28 Jan 2023 10:08 )  Alb: 2.6 g/dL / Pro: 4.6 g/dL / ALK PHOS: 70 U/L / ALT: 10 U/L / AST: 22 U/L / GGT: x                 MELD-Na  Dialysis at least twice in past week: Y/N?  Creatinine:  2.38  Total Bili:  <0.2  INR: 1.05  Sodium: 140        IMAGING: I personally reviewed the XXXX, and I agree with the radiologist's interpretation as described below:

## 2023-01-28 NOTE — ED ADULT TRIAGE NOTE - GLASGOW COMA SCALE: BEST VERBAL RESPONSE, MLM
Peripheral Nerve Block Procedure Note      Staff -   Anesthesiologist:  Castillo Goodson DO  Resident/Fellow: Blasie Bernal MD  Performed By: resident  Location: OB  Procedure Start/Stop TImes:     patient identified, IV checked, risks and benefits discussed, informed consent, monitors and equipment checked, pre-op evaluation, at physician/surgeon's request and post-op pain management      Correct Patient: Yes      Correct Position: Yes      Correct Site: Yes      Correct Procedure: Yes      Correct Laterality:  Yes    Site Marked:  Yes and No  Procedure details:     Procedure:  TAP    ASA:  2    Laterality:  Bilateral    Position:  Supine    Sterile Prep: chloraprep      Needle:  Touhy needle    Needle gauge:  21    Abnormal pain on injection: No      Blood Aspirated: No      Paresthesias:  No    Bleeding at site: No      Bolus via:  Needle    Infusion Method:  Single Shot    Blood aspirated via catheter: No      Complications:  None  Assessment/Narrative:      TAP BLOCK         (V5) oriented

## 2023-01-28 NOTE — ED PROVIDER NOTE - NSCAREINITIATED _GEN_ER
SUBJECTIVE:  Humphrey Burrows is a 3 year old male who presents with right ear pain that started 2 day(s) ago.  The patient (or parent) described the pain or symptoms as moderate.  The patient (or parent) reports that in addition to the ear pain he has symptoms that include:  He started to have a cough na da runny nose about 2 weeks ago.  His nose is better but his cough is still present       The patient (or parent) denies a history of recurrent otitis.  The patient(or parent) denies that he has been swimming recently.  The patient (or parent) denies that he has put Q-Tips into the ear canal recently.    No past medical history on file.  Current Outpatient Medications   Medication Sig Dispense Refill     acetaminophen (TYLENOL CHILDRENS) 160 MG/5ML suspension Take 160 mg by mouth once       albuterol (PROVENTIL) (2.5 MG/3ML) 0.083% neb solution Take 1 vial (2.5 mg) by nebulization every 4 hours 1 Box 1     Social History     Tobacco Use     Smoking status: Never Smoker     Smokeless tobacco: Never Used   Substance Use Topics     Alcohol use: No         OBJECTIVE:  Pulse 156   Temp 98.6  F (37  C) (Tympanic)   Wt 13.5 kg (29 lb 12.8 oz)   SpO2 97%    EXAM:  The right TM is distorted light reflex and erythematous     The right auditory canal is normal and without drainage, edema or erythema    The left TM is distorted light reflex and erythematous  The left auditory canal is normal and without drainage, edema or erythema    Oropharynx exam is normal: no lesions, erythema, adenopathy or exudate.  GENERAL: no acute distress  EYES: EOMI,  PERRL, conjunctiva clear  NECK: supple, non-tender to palpation, no adenopathy noted  RESP: lungs clear to auscultation - no rales, rhonchi or wheezes  CV: regular rates and rhythm, normal S1 S2, no murmur noted  SKIN: no suspicious lesions or rashes     ASSESSMENT:  Acute otitis media, bilateral    PLAN:  The patient was prescribed amoxicillin     Patient Instructions   I  recommended that the patient should return to clinic for an appointment if there is no improvement in the symptoms in the next 4-5 days. Otherwise, he should return to clinic for an appointment in 6 weeks to check for resolution of the fluid in the affected ear.        See orders in Epic         Karen Davidson(Attending)

## 2023-01-29 NOTE — PROGRESS NOTE ADULT - SUBJECTIVE AND OBJECTIVE BOX
Patient is a 88y old  Female who presents with a chief complaint of     BRIEF HOSPITAL COURSE: 87 y/o F with a h/o TAVR, CAD, on ASA/plavix, HTN, DM, CKD, admitted on  with complaints of shortness of breath x 1 day and endorsed recurrent bloody bowel movements for the past several weeks. H/H 6.7/22. Hypotensive. CXR suggestive of LLL pneumonia.    Events last 24 hours: Transfused 2u PRBC with good improvement in H/H. No evidence of active bleeding since admission to MICU, however her BP has continued to decline. Started on IV vasopressor. UA is positive. Blood cultures are growing gram negative rods and gram positive cocci in pairs.        PAST MEDICAL & SURGICAL HISTORY:      Review of Systems:  CONSTITUTIONAL: No fever, chills, or fatigue  EYES: No eye pain, visual disturbances, or discharge  ENMT:  No difficulty hearing, tinnitus, vertigo; No sinus or throat pain  NECK: No pain or stiffness  RESPIRATORY: No cough, wheezing, chills or hemoptysis; No shortness of breath  CARDIOVASCULAR: No chest pain, palpitations, dizziness, or leg swelling  GASTROINTESTINAL: No abdominal or epigastric pain. No nausea, vomiting, or hematemesis; No diarrhea or constipation. No melena or hematochezia.  GENITOURINARY: No dysuria, frequency, hematuria, or incontinence  NEUROLOGICAL: No headaches, memory loss, loss of strength, numbness, or tremors  SKIN: No itching, burning, rashes, or lesions   MUSCULOSKELETAL: No joint pain or swelling; No muscle, back, or extremity pain  PSYCHIATRIC: No depression, anxiety, mood swings, (+) difficulty sleeping      Medications:  azithromycin  IVPB      azithromycin  IVPB 500 milliGRAM(s) IV Intermittent every 24 hours  cefTRIAXone Injectable. 2000 milliGRAM(s) IV Push every 24 hours  midodrine 10 milliGRAM(s) Oral every 8 hours  norepinephrine Infusion 0.05 MICROgram(s)/kG/Min IV Continuous <Continuous>  pantoprazole Infusion 8 mG/Hr IV Continuous <Continuous>  dextrose 50% Injectable 25 Gram(s) IV Push once  dextrose 50% Injectable 12.5 Gram(s) IV Push once  dextrose 50% Injectable 25 Gram(s) IV Push once  dextrose Oral Gel 15 Gram(s) Oral once PRN  glucagon  Injectable 1 milliGRAM(s) IntraMuscular once  insulin lispro (ADMELOG) corrective regimen sliding scale   SubCutaneous every 6 hours  levothyroxine Injectable 100 MICROGram(s) IV Push at bedtime  dextrose 5%. 1000 milliLiter(s) IV Continuous <Continuous>  dextrose 5%. 1000 milliLiter(s) IV Continuous <Continuous>  influenza  Vaccine (HIGH DOSE) 0.7 milliLiter(s) IntraMuscular once  chlorhexidine 2% Cloths 1 Application(s) Topical <User Schedule>            ICU Vital Signs Last 24 Hrs  T(C): 36.5 (2023 03:00), Max: 37.1 (2023 09:03)  T(F): 97.7 (2023 03:00), Max: 98.8 (2023 09:03)  HR: 93 (2023 03:00) (64 - 99)  BP: 130/50 (2023 03:00) (64/50 - 130/50)  BP(mean): 63 (2023 03:00) (53 - 91)  ABP: --  ABP(mean): --  RR: 19 (2023 03:00) (13 - 24)  SpO2: 100% (2023 03:00) (89% - 100%)    O2 Parameters below as of 2023 20:00  Patient On (Oxygen Delivery Method): room air                I&O's Detail    2023 07:01  -  2023 03:16  --------------------------------------------------------  IN:    IV PiggyBack: 250 mL    Lactated Ringers Bolus: 1000 mL    Pantoprazole: 80 mL    PRBCs (Packed Red Blood Cells): 600 mL  Total IN: 1930 mL    OUT:    Indwelling Catheter - Urethral (mL): 370 mL  Total OUT: 370 mL    Total NET: 1560 mL            LABS:                        9.2    11.16 )-----------( 174      ( 2023 19:57 )             29.7     01    137  |  103  |  87.8<H>  ----------------------------<  210<H>  5.5<H>   |  25.0  |  2.25<H>    Ca    9.7      2023 17:20  Mg     2.1         TPro  4.6<L>  /  Alb  2.6<L>  /  TBili  <0.2<L>  /  DBili  x   /  AST  22  /  ALT  10  /  AlkPhos  70        CARDIAC MARKERS ( 2023 10:08 )  x     / <0.01 ng/mL / x     / x     / x          CAPILLARY BLOOD GLUCOSE      POCT Blood Glucose.: 125 mg/dL (2023 01:06)    PT/INR - ( 2023 10:08 )   PT: 12.2 sec;   INR: 1.05 ratio         PTT - ( 2023 10:08 )  PTT:24.2 sec  Urinalysis Basic - ( 2023 21:28 )    Color: Yellow / Appearance: Slightly Turbid / S.015 / pH: x  Gluc: x / Ketone: Negative  / Bili: Negative / Urobili: Negative mg/dL   Blood: x / Protein: 15 / Nitrite: Negative   Leuk Esterase: Moderate / RBC: 3-5 /HPF / WBC 26-50 /HPF   Sq Epi: x / Non Sq Epi: Occasional / Bacteria: Occasional      CULTURES:  Culture Results:   Growth in aerobic bottle: Gram Negative Rods and Gram positive cocci in  pairs  ***Blood Panel PCR results on this specimen are available  approximately 3 hours after the Gram stain result.***  Gram stain, PCR, and/or culture results may not always  correspond due to difference in methodologies.  ************************************************************  This PCR assay was performed by multiplex PCR. This  Assay tests for 66 bacterial and resistance gene targets.  Please refer to the [facility] test directory  at [email or web address] for details. (23 @ 11:15)  Rapid RVP Result: NotDetec (23 @ 10:08)        Physical Examination:    General: No acute distress.  Alert, oriented, interactive, nonfocal, pleasant    HEENT: Pupils equal, reactive to light.  Symmetric.    PULM: Clear to auscultation bilaterally, no significant sputum production    CVS: Regular rate and rhythm, no murmurs, rubs, or gallops    ABD: Soft, nondistended, nontender, normoactive bowel sounds, no masses    EXT: No edema, nontender    SKIN: Warm and well perfused, no rashes noted.    NEURO: A&Ox3, strength 5/5 all extremities, cranial nerves grossly intact, no focal deficits        RADIOLOGY:     < from: Xray Chest 1 View-PORTABLE IMMEDIATE (23 @ 10:20) >  Normal heart and mediastinum with prior transaortic valve replacement.   Lungs reveal patchy infiltrates at the left lung base and possible   associated small effusion. The exam is otherwise unremarkable. The bones   reveal multilevel DJD of the thoracic spine    IMPRESSION: Left lower lung infiltrates may represent pneumonia/effusion.   Additional findings as above          CRITICAL CARE TIME SPENT: 37 mins  Time spent evaluating/treating patient with medical issues that pose a high risk for life threatening deterioration and/or end-organ damage, reviewing data/labs/imaging, discussing case with multidisciplinary team, discussing plan/goals of care with patient/family. Non-inclusive of procedure time.

## 2023-01-29 NOTE — PROGRESS NOTE ADULT - ASSESSMENT
87 y/o F with a h/o TAVR, CAD, on ASA/plavix, HTN, DM, CKD with:    # Septic shock  # Gram negative laura/GPC pairs bacteremia  # UTI  # Lobar pneumonia   # GIB  # Acute blood loss anemia  # ANA CRISTINA  # Hyperkalemia    Suspect shock state is primarily distributive in nature secondary to sepsis. Blood loss/hypovolemia contributing as well.    - transfused 2u PRBC, Hgb 6.7 --> 9.2, no bleeding clinically in MICU  - continue to transfuse blood product for Hgb < 7 and/or symptomatic bleeding  - plan for EGD today  - bedside POCUS revealed thin collapsible IVC, bolused 1.5L crystalloid total overnight into this morning  - started on norepinephrine infusion to maintain a MAP > 65  - added midodrine 10mg TID  - continue ceftriaxone and azithromycin for now pending organism speciation  - send urine culture  - ANA CRISTINA secondary to ischemic ATN, optimize end-organ perfusion as above  - trend BUN/Cr, electrolytes, acid-base balance, and monitor UOP  - potassium level downtrending with medical treatment (K 6.8 --> 5.5)     Case discussed with MICU physician, Dr. Vargas.

## 2023-01-29 NOTE — CONSULT NOTE ADULT - ASSESSMENT
87 y/o F hx of aortic valve repair, CAD on ASA/plavix, HTN, DM presented with dypnea x1 day and bloody bowel movements for the past several weeks." Denies prior episodes of GI bleeding. was recently treated with z-pack for bronchitis per patient. the ER, patient hypotensive with SBP in the 70s on arrival, Placed on 4L nasal cannula. Hb 6.7 on presentation- s/p 2 U PRBC. Chest xray shows LLL PNA and was given ceftriaxone and vancomycin. Noted to have hyperkalemia (K 6.8) and elevated creatinine. Shee is now sp EGD this Am with cauterization of bleeding duodenal ulcers    Hyperkalemia   K+ 6.7, no EKG changes  from potassium load in setting of UGIB  Anticipating K+ to improve now that GIB source possibly contained  Start Lokelma 10 gq8h;l monitor K+ levels    CKD stage IV  baseline Scr ~2.0-2.3  Kidney function at baseline at this time  UA with large WBC, LE, bacteria, large blood, RBCs  obtain renal US      UGIB  s/p 3 U PRBC  s/p EGD and enoclip/ thermal therapy of duodenal ulcers  IV PPI    d/w MICU team

## 2023-01-29 NOTE — BRIEF OPERATIVE NOTE - COMMENTS
Admit to Hospital  Continue current medical regimen.     IV PPI drip for additional 48 hours followed by PO PPI BID for 12 weeks.  Monitor Hb and keep the target hb at 8mg/dl  Stool for H Pylori antigen   Avoid NSAIDs and antiplatelet therapy at this time   Clear liquid diet today  If she has recurrent bleeding suggest to consult IR for embolization.   Obtain CT abdomen with PO contrast without IV contrast given GP and GN bacteremia.

## 2023-01-29 NOTE — PROGRESS NOTE ADULT - NSPROGADDITIONALINFOA_GEN_ALL_CORE
Attending and PA/NP shared services statement:     PA/NP to bill.     I independently performed the documented history, exam, and medical decision making.     Attending comments:  88 F with hx of CAD, hx of TAVR, HTN, DM, CKD, present on 1/28 for SOB and recurrent BRBPR.  hgb was found 6.7, and baseline 12 few weeks ago.  Was also found to be hypotensive.  Admitted to MICU possible hemorrhagic shock 2/2 to acute blood loss anemia from GI bleed.  Patient remains hypotensive despite 2 unit of pRBC.  Started on levophed.  Patient found to have positive UA, and + gram negative bacteremia.  Given concern for septic shock, gave 1.5 L of bolus and started ceftriaxone 2 g daily.  Patient also started on midodrine.  Mental status remains intact, will continue current management.  will repeat CbC at 9 am.  Reset of plan per LIZA Jerome.

## 2023-01-29 NOTE — BRIEF OPERATIVE NOTE - OPERATION/FINDINGS
Normal mucosa in the gastroesophageal junction and whole esophagus.  	Normal mucosa in the stomach.  	Normal mucosa in the First part of the duodenum.  	Ulcer in the second part of the duodenum.  	Ulcer in the second part of the duodenal. (Thermal Therapy and 2 Endoclips were placed)

## 2023-01-29 NOTE — CONSULT NOTE ADULT - SUBJECTIVE AND OBJECTIVE BOX
[facility] DIVISION OF KIDNEY DISEASES AND HYPERTENSION -- INITIAL CONSULT NOTE  --------------------------------------------------------------------------------  HPI:   87 y/o F hx of aortic valve repair, CAD on ASA/plavix, HTN, DM presented with dypnea x1 day and bloody bowel movements for the past several weeks." Denies prior episodes of GI bleeding. was recently treated with z-pack for bronchitis per patient. the ER, patient hypotensive with SBP in the 70s on arrival, Placed on 4L nasal cannula. Hb 6.7 on presentation- s/p 2 U PRBC. Chest xray shows LLL PNA and was given ceftriaxone and vancomycin. Noted to have hyperkalemia (K 6.8) and elevated creatinine.  He is now sp EGD this Am. K+ remained high and nephrology was consulted.        PAST HISTORY  --------------------------------------------------------------------------------  PAST MEDICAL & SURGICAL HISTORY:    FAMILY HISTORY: no FH of kidney disease    PAST SOCIAL HISTORY: lives at home    ALLERGIES & MEDICATIONS  --------------------------------------------------------------------------------  Allergies  No Known Allergies      Standing Inpatient Medications  azithromycin  IVPB      azithromycin  IVPB 500 milliGRAM(s) IV Intermittent every 24 hours  chlorhexidine 2% Cloths 1 Application(s) Topical <User Schedule>  dextrose 5%. 1000 milliLiter(s) IV Continuous <Continuous>  dextrose 5%. 1000 milliLiter(s) IV Continuous <Continuous>  dextrose 50% Injectable 25 Gram(s) IV Push once  dextrose 50% Injectable 12.5 Gram(s) IV Push once  dextrose 50% Injectable 25 Gram(s) IV Push once  dextrose 50% Injectable 50 milliLiter(s) IV Push once  diatrizoate meglumine/diatrizoate sodium. 30 milliLiter(s) Oral once  glucagon  Injectable 1 milliGRAM(s) IntraMuscular once  influenza  Vaccine (HIGH DOSE) 0.7 milliLiter(s) IntraMuscular once  insulin lispro (ADMELOG) corrective regimen sliding scale   SubCutaneous every 6 hours  insulin regular  human recombinant 10 Unit(s) IV Push once  levothyroxine Injectable 100 MICROGram(s) IV Push at bedtime  midodrine 10 milliGRAM(s) Oral every 8 hours  norepinephrine Infusion 0.05 MICROgram(s)/kG/Min IV Continuous <Continuous>  pantoprazole Infusion 8 mG/Hr IV Continuous <Continuous>  piperacillin/tazobactam IVPB.- 3.375 Gram(s) IV Intermittent once  vancomycin  IVPB 1000 milliGRAM(s) IV Intermittent <User Schedule>    PRN Inpatient Medications  dextrose Oral Gel 15 Gram(s) Oral once PRN  melatonin 5 milliGRAM(s) Oral at bedtime PRN      REVIEW OF SYSTEMS  --------------------------------------------------------------------------------  Gen: No weight changes, fatigue, fevers/chills, weakness  Skin: No rashes  Head/Eyes/Ears/Mouth: No headache; Normal hearing; Normal vision w/o blurriness; No sinus pain/discomfort, sore throat  Respiratory: No dyspnea, cough, wheezing, hemoptysis  CV: No chest pain, PND, orthopnea  GI: No abdominal pain, diarrhea, constipation, nausea, vomiting, melena, hematochezia  : No increased frequency, dysuria, hematuria, nocturia  MSK: No joint pain/swelling; no back pain; no edema  Neuro: No dizziness/lightheadedness, weakness, seizures, numbness, tingling  Heme: No easy bruising or bleeding  Endo: No heat/cold intolerance  Psych: No significant nervousness, anxiety, stress, depression    All other systems were reviewed and are negative, except as noted.    VITALS/PHYSICAL EXAM  --------------------------------------------------------------------------------  T(C): 35.9 (01-29-23 @ 11:30), Max: 36.8 (01-29-23 @ 05:45)  HR: 92 (01-29-23 @ 11:30) (67 - 99)  BP: 114/57 (01-29-23 @ 11:30) (64/50 - 178/161)  RR: 14 (01-29-23 @ 11:30) (8 - 20)  SpO2: 100% (01-29-23 @ 11:30) (83% - 100%)  Wt(kg): --  Height (cm): 157.5 (01-28-23 @ 09:03)  Weight (kg): 95.3 (01-28-23 @ 09:03)  BMI (kg/m2): 38.4 (01-28-23 @ 09:03)  BSA (m2): 1.95 (01-28-23 @ 09:03)      01-28-23 @ 07:01  -  01-29-23 @ 07:00  --------------------------------------------------------  IN: 1986 mL / OUT: 595 mL / NET: 1391 mL    01-29-23 @ 07:01  -  01-29-23 @ 12:00  --------------------------------------------------------  IN: 318 mL / OUT: 30 mL / NET: 288 mL      Physical Exam:  	Gen: NAD, well-appearing  	HEENT: PERRL, supple neck, clear oropharynx  	Pulm: CTA B/L  	CV: RRR, S1S2; no rub  	Back: No spinal or CVA tenderness; no sacral edema  	Abd: +BS, soft, nontender/nondistended  	: No suprapubic tenderness  	UE: Warm, FROM, no clubbing, intact strength; no edema; no asterixis  	LE: Warm, FROM, no clubbing, intact strength; no edema  	Neuro: No focal deficits, intact gait  	Psych: Normal affect and mood  	Skin: Warm, without rashes  	Vascular access:    LABS/STUDIES  --------------------------------------------------------------------------------              6.7    15.40 >-----------<  200      [01-29-23 @ 10:16]              20.4     136  |  104  |  93.8  ----------------------------<  229      [01-29-23 @ 10:16]  6.7   |  22.0  |  2.35        Ca     8.7     [01-29-23 @ 10:16]      Mg     1.9     [01-29-23 @ 04:11]      Phos  3.8     [01-29-23 @ 04:11]    TPro  4.6  /  Alb  2.6  /  TBili  <0.2  /  DBili  x   /  AST  22  /  ALT  10  /  AlkPhos  70  [01-28-23 @ 10:08]    PT/INR: PT 12.2 , INR 1.05       [01-28-23 @ 10:08]  PTT: 24.2       [01-28-23 @ 10:08]    Troponin <0.01      [01-28-23 @ 10:08]    Creatinine Trend:  SCr 2.35 [01-29 @ 10:16]  SCr 2.37 [01-29 @ 04:11]  SCr 2.25 [01-28 @ 17:20]  SCr 2.38 [01-28 @ 10:08]    Urinalysis - [01-29-23 @ 04:11]      Color Yellow / Appearance Clear / SG 1.015 / pH 6.0      Gluc Negative / Ketone Negative  / Bili Negative / Urobili Negative       Blood Small / Protein Negative / Leuk Est Moderate / Nitrite Negative      RBC 3-5 / WBC 26-50 / Hyaline  / Gran  / Sq Epi  / Non Sq Epi Few / Bacteria Few      TSH 8.17      [01-28-23 @ 10:08]       [facility] DIVISION OF KIDNEY DISEASES AND HYPERTENSION -- INITIAL CONSULT NOTE  --------------------------------------------------------------------------------  HPI:   87 y/o F hx of aortic valve repair, CAD on ASA/plavix, HTN, DM presented with dypnea x1 day and bloody bowel movements for the past several weeks." Denies prior episodes of GI bleeding. was recently treated with z-pack for bronchitis per patient. the ER, patient hypotensive with SBP in the 70s on arrival, Placed on 4L nasal cannula. Hb 6.7 on presentation- s/p 2 U PRBC. Chest xray shows LLL PNA and was given ceftriaxone and vancomycin. Noted to have hyperkalemia (K 6.8) and elevated creatinine.  She is now sp EGD this Am. K+ remained high and nephrology was consulted.      PAST HISTORY  --------------------------------------------------------------------------------  PAST MEDICAL & SURGICAL HISTORY:    FAMILY HISTORY: no FH of kidney disease    PAST SOCIAL HISTORY: lives at home    ALLERGIES & MEDICATIONS  --------------------------------------------------------------------------------  Allergies  No Known Allergies      Standing Inpatient Medications  azithromycin  IVPB      azithromycin  IVPB 500 milliGRAM(s) IV Intermittent every 24 hours  chlorhexidine 2% Cloths 1 Application(s) Topical <User Schedule>  dextrose 5%. 1000 milliLiter(s) IV Continuous <Continuous>  dextrose 5%. 1000 milliLiter(s) IV Continuous <Continuous>  dextrose 50% Injectable 25 Gram(s) IV Push once  dextrose 50% Injectable 12.5 Gram(s) IV Push once  dextrose 50% Injectable 25 Gram(s) IV Push once  dextrose 50% Injectable 50 milliLiter(s) IV Push once  diatrizoate meglumine/diatrizoate sodium. 30 milliLiter(s) Oral once  glucagon  Injectable 1 milliGRAM(s) IntraMuscular once  influenza  Vaccine (HIGH DOSE) 0.7 milliLiter(s) IntraMuscular once  insulin lispro (ADMELOG) corrective regimen sliding scale   SubCutaneous every 6 hours  insulin regular  human recombinant 10 Unit(s) IV Push once  levothyroxine Injectable 100 MICROGram(s) IV Push at bedtime  midodrine 10 milliGRAM(s) Oral every 8 hours  norepinephrine Infusion 0.05 MICROgram(s)/kG/Min IV Continuous <Continuous>  pantoprazole Infusion 8 mG/Hr IV Continuous <Continuous>  piperacillin/tazobactam IVPB.- 3.375 Gram(s) IV Intermittent once  vancomycin  IVPB 1000 milliGRAM(s) IV Intermittent <User Schedule>    PRN Inpatient Medications  dextrose Oral Gel 15 Gram(s) Oral once PRN  melatonin 5 milliGRAM(s) Oral at bedtime PRN      REVIEW OF SYSTEMS  --------------------------------------------------------------------------------  Gen: No weight changes, fatigue, fevers/chills, weakness  Skin: No rashes  Head/Eyes/Ears/Mouth: No headache; Normal hearing; Normal vision w/o blurriness; No sinus pain/discomfort, sore throat  Respiratory: No dyspnea, cough, wheezing, hemoptysis  CV: No chest pain, PND, orthopnea  GI: No abdominal pain, diarrhea, constipation, nausea, vomiting, melena, hematochezia  : No increased frequency, dysuria, hematuria, nocturia  MSK: No joint pain/swelling; no back pain; no edema  Neuro: No dizziness/lightheadedness, weakness, seizures, numbness, tingling  Heme: No easy bruising or bleeding  Endo: No heat/cold intolerance  Psych: No significant nervousness, anxiety, stress, depression    All other systems were reviewed and are negative, except as noted.    VITALS/PHYSICAL EXAM  --------------------------------------------------------------------------------  T(C): 35.9 (01-29-23 @ 11:30), Max: 36.8 (01-29-23 @ 05:45)  HR: 92 (01-29-23 @ 11:30) (67 - 99)  BP: 114/57 (01-29-23 @ 11:30) (64/50 - 178/161)  RR: 14 (01-29-23 @ 11:30) (8 - 20)  SpO2: 100% (01-29-23 @ 11:30) (83% - 100%)  Wt(kg): --  Height (cm): 157.5 (01-28-23 @ 09:03)  Weight (kg): 95.3 (01-28-23 @ 09:03)  BMI (kg/m2): 38.4 (01-28-23 @ 09:03)  BSA (m2): 1.95 (01-28-23 @ 09:03)      01-28-23 @ 07:01  -  01-29-23 @ 07:00  --------------------------------------------------------  IN: 1986 mL / OUT: 595 mL / NET: 1391 mL    01-29-23 @ 07:01  -  01-29-23 @ 12:00  --------------------------------------------------------  IN: 318 mL / OUT: 30 mL / NET: 288 mL      Physical Exam:  	Gen: NAD  	HEENT: supple neck  	Pulm: CTA B/L  	CV: RRR, S1S2; no rub  	Abd: +BS, soft, nontender/nondistended  	: No suprapubic tenderness  	UE: Warm, ; no edema  	LE: Warm, no edema  	Neuro: No focal deficit  	Skin: Warm,   LABS/STUDIES  --------------------------------------------------------------------------------              6.7    15.40 >-----------<  200      [01-29-23 @ 10:16]              20.4     136  |  104  |  93.8  ----------------------------<  229      [01-29-23 @ 10:16]  6.7   |  22.0  |  2.35        Ca     8.7     [01-29-23 @ 10:16]      Mg     1.9     [01-29-23 @ 04:11]      Phos  3.8     [01-29-23 @ 04:11]    TPro  4.6  /  Alb  2.6  /  TBili  <0.2  /  DBili  x   /  AST  22  /  ALT  10  /  AlkPhos  70  [01-28-23 @ 10:08]    PT/INR: PT 12.2 , INR 1.05       [01-28-23 @ 10:08]  PTT: 24.2       [01-28-23 @ 10:08]    Troponin <0.01      [01-28-23 @ 10:08]    Creatinine Trend:  SCr 2.35 [01-29 @ 10:16]  SCr 2.37 [01-29 @ 04:11]  SCr 2.25 [01-28 @ 17:20]  SCr 2.38 [01-28 @ 10:08]    Urinalysis - [01-29-23 @ 04:11]      Color Yellow / Appearance Clear / SG 1.015 / pH 6.0      Gluc Negative / Ketone Negative  / Bili Negative / Urobili Negative       Blood Small / Protein Negative / Leuk Est Moderate / Nitrite Negative      RBC 3-5 / WBC 26-50 / Hyaline  / Gran  / Sq Epi  / Non Sq Epi Few / Bacteria Few      TSH 8.17      [01-28-23 @ 10:08]

## 2023-01-29 NOTE — PROVIDER CONTACT NOTE (CRITICAL VALUE NOTIFICATION) - TEST AND RESULT REPORTED:
Blood Cultures drawn 1/28/23  Aerobic AND Anaerobic Bottle: gram negative rods and gram positive cocci in pairs

## 2023-01-30 NOTE — DIETITIAN INITIAL EVALUATION ADULT - PATIENT MEETS CRITERIA FOR MALNUTRITION
[facility]

                             Created on: 2018



Saida Fermin

External Reference #: [id number]

: 1983

Sex: Female



Demographics





                          Address                   [address] [place]  [address]

 

                          Preferred Language        Unknown

 

                          Marital Status            Unknown

 

                          Bahai Affiliation     Unknown

 

                          Race                      Unknown

 

                          Ethnic Group              Unknown





Author





                          Author                    Saida VELAZQUEZ

 

                          Organization              [facility]

 

                          Address                   Unknown

 

                          Phone                     [phone]







Care Team Providers





                    Care Team Member Name Role                Phone

 

                    HANNA VELAZQUEZ     Unavailable         [phone]







PROBLEMS





          Type      Condition ICD9-CM Code YDD37-WL Code Onset Dates Condition S

tatus SNOMED 

Code

 

                Problem         Iron deficiency anemia secondary to inadequate d

ietary iron intake                 

D50.8                                   Active              [id number]

 

                Problem         Migraine without aura and without status migrain

osus, not intractable                 

G43.009                                 Active              [id number]

 

          Problem   Abnormal CBC measurement           R79.89              Activ

e    [id number]

 

          Problem   Obesity (BMI 30.0-34.9)           E66.9               Active

    [id number]

 

          Problem   Hyperinsulinemia           E16.1               Active    834

[id number]







ALLERGIES

No Information



ENCOUNTERS





                Encounter       Location        Date            Diagnosis

 

                          [place] [address] [address]

                                         

 

                          [place] [address] [address]

                          20 2018              Encounter for well woman exa

m Z01.419 ; Encounter for Depo-Provera 

contraception Z30.42 ; Obesity (BMI 30.0-34.9) E66.9 and Hyperinsulinemia E16.1

 

                          [place] [address] [address]

                                        Hyperinsulinemia E16.1

 

                          [place] [address] [address]

                                        Hyperinsulinemia E16.1

 

                          [place] [address] [address]

                                        Hyperinsulinemia E16.1 ; Zackery

hayes without aura and without status 

migrainosus, not intractable G43.009 ; Obesity (BMI 30.0-34.9) E66.9 and Iron 
deficiency anemia secondary to inadequate dietary iron intake D50.8

 

                          [facility]     [address] [address]

                          15 Dec, 2017              Hyperinsulinemia E16.1 ; Zackery

hayes without aura and without status 

migrainosus, not intractable G43.009 ; Obesity (BMI 30.0-34.9) E66.9 ; Iron 
deficiency anemia secondary to inadequate dietary iron intake D50.8 ; Oral 
contraceptive pill surveillance Z30.41 and Dysuria R30.0

 

                          [place] [address] [address]

                          15 Dec, 2017              Abnormal CBC R79.89

 

                          [facility]  [address] [place] [address] 

[phone]                              Tachycardia R00.0

 

                          [place] [address] [address]

                          17 2017               

 

                          [place] [address] [address]

                          14 Sep, 2017              Hyperinsulinemia E16.1 ; Obe

sity (BMI 30.0-34.9) E66.9 and Migraine

without aura and without status migrainosus, not intractable G43.009

 

                          [place] [address] [address]

                          15 Aug, 2017               

 

                          [facility] [place] [facility]  [address] 

[address]                10 Aug, 2017              Acute non-recurrent maxillar

y sinusitis J01.00

 

                          [facility]   [address] [place] [id number]

                                        Dental examination Z01.20

 

                          [facility]   [address] [place] [id number] [place] [id number]

                          31 May, 2017              Dental examination Z01.20

 

                          [facility]     [address] [address]

                          03 2017              Encounter for well woman exa

m with routine gynecological exam 

Z01.419 ; Encounter for screening for malignant neoplasm of cervix Z12.4 ; 
Encounter for screening breast examination Z12.39 ; Screen for STD (sexually 
transmitted disease) Z11.3 and Encounter for prescription of oral contraceptives
Z30.011

 

                          [facility]   [address] [place] [phone]

                          13 2017              Dental examination Z01.20

 

                          [facility]     [address] [place] [id number]

[address] [place] [address]

                          19 Dec, 2016              Abnormal CBC R79.89

 

                          [facility]     [address] [address]

                          12 Dec, 2016              Routine health maintenance Z

00.00 ; History of renal calculi 

Z87.442 ; Obesity (BMI 30.0-34.9) E66.9 and Family history of diabetes mellitus 
Z83.3

 

                          [facility]     [address] [address]

                          08 Dec, 2016              Routine health maintenance Z

00.00 ; History of renal calculi 

Z87.442 ; Obesity (BMI 30.0-34.9) E66.9 and Family history of diabetes mellitus 
Z83.3

 

                          [facility]  [address] [place] [id number]

[address] [place] [address] 

[address]                28 Sep, 2016              Irritable bowel syndrome wit

h diarrhea K58.0

 

                          [facility]   [address]

[address] [place] [phone]

                          24 Aug, 2016              Dental examination Z01.20

 

                          [facility]   [address] [place] [phone]

                                        Dental caries K02.9

 

                          [facility]   [address] [place] [phone]

                          24 May, 2016              Dental examination Z01.20







IMMUNIZATIONS

No Known Immunizations



SOCIAL HISTORY

Never Assessed



REASON FOR VISIT

pain 



PLAN OF CARE





                          Activity                  Details

 

                                         

 

                          Follow Up                 prn Reason:TE #31







VITAL SIGNS





MEDICATIONS





        Medication Instructions Dosage  Frequency Start Date End Date Duration S

tatus

 

        Norco 5-325 MG Orally every 6 hrs 1 tablet as needed 6h                 

     4 days  Active

 

        Amoxicillin 500 MG Orally Three times a day 1 capsule 8h                

      7 days  Active







RESULTS

No Results



PROCEDURES





                Procedure       Date Ordered    Result          Body Site

 

                LTD ORAL EVALUATION - PROBLEM FOCUS 2017               

     

 

                INTRAORL-PERIAPICAL 1 FILM 78081 2017                  

  







INSTRUCTIONS





MEDICATIONS ADMINISTERED

No Known Medications



MEDICAL (GENERAL) HISTORY





                    Type                Description         Date

 

                    Medical History     ERCP                 

 

                    Medical History     Renal calculi        

 

                    Medical History     Migraines            

 

                    Surgical History    kidney stone removed  

 

                    Hospitalization History kidney stones        

 

                          Hospitalization History   ER visit- itching all over b

madelin, blurry vision, SOB dxd 

migraine                                2018 no

## 2023-01-30 NOTE — DIETITIAN INITIAL EVALUATION ADULT - PERTINENT LABORATORY DATA
01-30    137  |  106  |  89.1<H>  ----------------------------<  158<H>  5.1   |  21.0<L>  |  2.46<H>    Ca    8.6      30 Jan 2023 03:40  Phos  3.6     01-30  Mg     1.9     01-30    TPro  4.6<L>  /  Alb  2.6<L>  /  TBili  <0.2<L>  /  DBili  x   /  AST  22  /  ALT  10  /  AlkPhos  70  01-28  POCT Blood Glucose.: 153 mg/dL (01-30-23 @ 05:11)  A1C with Estimated Average Glucose Result: 6.0 % (01-29-23 @ 04:11)

## 2023-01-30 NOTE — PROGRESS NOTE ADULT - ASSESSMENT
88 year old female with PMH of excessive BMI, DM, HTN, CAD (on ASA/Plavix), CKD 4, hx of aortic valve repair and bloody bowel movements for the "past several weeks" presents with SOB. Admitted for PNA and GI bleed. Found to have duodenal ulcer on EGD, on PPI gtt. Still with ongoing GI bleed at this time. Hospital course is notable for CKD and hyperK (resolved).     -Creatinine ranged ~1.9-2.2mg/dL in June-July 2022, then 2.6mg/dL in Sept 2022 (as per [facility])  -While here, creatinine ranged ~2.2-2.4mg/dL (which is on par with her baseline renal function)  -UA 15 protein, small blood, 3-5 RBC, 26-50 WBC, occasional bacteria  -Doubtful of underlying GN as patient remains at her baseline renal function   -Recommend renal ultrasound once more stable (non urgent at this time)  -Remain non oliguric at this time  -Normokalemic as well    -Hemodynamics - remains in shock, currently on levophed + midodrine - management as per primary team   -Metabolic acidosis in setting of CKD - serum bicarb is WNL  -Anemia in setting of GI bleed / CKD - recommend checking iron panel + ferritin; to undergo IR embolization   -Mineral Bone Disease in setting of CKD - will check PTH and 25 Vitamin D     Luis Angel Bear DO  Nephrology  [facility]   Office Number [phone]

## 2023-01-30 NOTE — CHART NOTE - NSCHARTNOTEFT_GEN_A_CORE
This is a 88 year old female who in 24 hr period has received 3 PRBC 1 platelets 1 FFP and now in the past hour has increased bleeding from lower GI. The hypovolemic shock state is worsening with increasing IV pressor requirements overnight and continued drop in hemoglobin now 7.2.  Although the creatinine is elevated ( it has remained in the 2.4 range for past 12 hrs ) it is at this time medically necessary to give contrast in the state of acute kidney injury to rule out source of bleeding given increased bleeding this morning and worsening shock state.  Disscused with Dr Vargas who is aware

## 2023-01-30 NOTE — DIETITIAN INITIAL EVALUATION ADULT - PERTINENT MEDS FT
MEDICATIONS  (STANDING):  chlorhexidine 2% Cloths 1 Application(s) Topical <User Schedule>  dextrose 5%. 1000 milliLiter(s) (100 mL/Hr) IV Continuous <Continuous>  dextrose 5%. 1000 milliLiter(s) (50 mL/Hr) IV Continuous <Continuous>  dextrose 50% Injectable 25 Gram(s) IV Push once  dextrose 50% Injectable 12.5 Gram(s) IV Push once  dextrose 50% Injectable 25 Gram(s) IV Push once  glucagon  Injectable 1 milliGRAM(s) IntraMuscular once  influenza  Vaccine (HIGH DOSE) 0.7 milliLiter(s) IntraMuscular once  insulin lispro (ADMELOG) corrective regimen sliding scale   SubCutaneous every 6 hours  levothyroxine Injectable 100 MICROGram(s) IV Push at bedtime  midodrine 10 milliGRAM(s) Oral every 8 hours  norepinephrine Infusion 0.05 MICROgram(s)/kG/Min (8.93 mL/Hr) IV Continuous <Continuous>  pantoprazole Infusion 8 mG/Hr (10 mL/Hr) IV Continuous <Continuous>  piperacillin/tazobactam IVPB.. 3.375 Gram(s) IV Intermittent every 12 hours    MEDICATIONS  (PRN):  dextrose Oral Gel 15 Gram(s) Oral once PRN Blood Glucose LESS THAN 70 milliGRAM(s)/deciliter  melatonin 5 milliGRAM(s) Oral at bedtime PRN Sleep

## 2023-01-30 NOTE — PROGRESS NOTE ADULT - SUBJECTIVE AND OBJECTIVE BOX
Chief Complaint: This is a 88y old woman patient being seen in follow-up consultation for melena.     Interval HPI / 24H events:  Patient is seen ans examined in ICU bed, reports back pain. Reports 8 episodes of BRBPR overnight per ICU nurse. Her hemoglobin 7.2 this morning and received another units after this.     ROS: A 14-point review of systems was reviewed and was otherwise negative save what was reported in the HPI.    PAST MEDICAL/SURGICAL HISTORY:  Hypertension    History of Hyperthyroidism    Hyperlipemia    Diabetes mellitus type II    S/P TKR (total knee replacement)  left knee    Carpal tunnel syndrome on both sides    History of cataract extraction  bilateral    H/O total hysterectomy    History of laminectomy      MEDICATIONS  (STANDING):  chlorhexidine 2% Cloths 1 Application(s) Topical <User Schedule>  dextrose 5%. 1000 milliLiter(s) (100 mL/Hr) IV Continuous <Continuous>  dextrose 5%. 1000 milliLiter(s) (50 mL/Hr) IV Continuous <Continuous>  dextrose 50% Injectable 25 Gram(s) IV Push once  dextrose 50% Injectable 12.5 Gram(s) IV Push once  dextrose 50% Injectable 25 Gram(s) IV Push once  glucagon  Injectable 1 milliGRAM(s) IntraMuscular once  influenza  Vaccine (HIGH DOSE) 0.7 milliLiter(s) IntraMuscular once  insulin lispro (ADMELOG) corrective regimen sliding scale   SubCutaneous every 6 hours  levothyroxine Injectable 100 MICROGram(s) IV Push at bedtime  midodrine 10 milliGRAM(s) Oral every 8 hours  norepinephrine Infusion 0.05 MICROgram(s)/kG/Min (8.93 mL/Hr) IV Continuous <Continuous>  pantoprazole Infusion 8 mG/Hr (10 mL/Hr) IV Continuous <Continuous>  piperacillin/tazobactam IVPB.. 3.375 Gram(s) IV Intermittent every 12 hours    MEDICATIONS  (PRN):  dextrose Oral Gel 15 Gram(s) Oral once PRN Blood Glucose LESS THAN 70 milliGRAM(s)/deciliter  melatonin 5 milliGRAM(s) Oral at bedtime PRN Sleep    No Known Allergies    T(C): 35.8 (01-30-23 @ 09:45), Max: 36.9 (01-29-23 @ 15:00)  HR: 75 (01-30-23 @ 09:45) (73 - 120)  BP: 106/65 (01-30-23 @ 09:45) (67/56 - 141/98)  RR: 13 (01-30-23 @ 09:45) (11 - 27)  SpO2: 98% (01-30-23 @ 09:45) (92% - 100%)    I&O's Summary    29 Jan 2023 07:01  -  30 Jan 2023 07:00  --------------------------------------------------------  IN: 1915.9 mL / OUT: 1025 mL / NET: 890.9 mL    30 Jan 2023 07:01  -  30 Jan 2023 10:12  --------------------------------------------------------  IN: 71.2 mL / OUT: 110 mL / NET: -38.8 mL      PHYSICAL EXAM:      Constitutional:  Eyes:  ENMT:  Neck:  Breasts:  Back:  Respiratory:  Cardiovascular:  Gastrointestinal:  Genitourinary:  Rectal:  Extremities:  Vascular:  Neurological:  Skin:  Lymph Nodes:  Musculoskeletal:  Psychiatric:      LABS:               7.2    10.38 )-----------( 173      ( 01-30 @ 03:40 )             20.9                7.9    10.78 )-----------( 167      ( 01-29 @ 21:30 )             23.8                7.9    10.19 )-----------( 181      ( 01-29 @ 18:11 )             23.1                6.7    15.40 )-----------( 200      ( 01-29 @ 10:16 )             20.4                7.6    10.92 )-----------( 169      ( 01-29 @ 04:11 )             23.5                9.2    11.16 )-----------( 174      ( 01-28 @ 19:57 )             29.7                6.7    8.79  )-----------( 188      ( 01-28 @ 10:08 )             22.3       01-30    137  |  106  |  89.1<H>  ----------------------------<  158<H>  5.1   |  21.0<L>  |  2.46<H>    Ca    8.6      30 Jan 2023 03:40  Phos  3.6     01-30  Mg     1.9     01-30                MELD-Na  Dialysis at least twice in past week: Y/N?  Creatinine:  2.46  Total Bili:  --  INR: --  Sodium: 137  MELD-Na  Dialysis at least twice in past week: Y/N?  Creatinine:  2.47  Total Bili:  --  INR: --  Sodium: 136  MELD-Na  Dialysis at least twice in past week: Y/N?  Creatinine:  2.48  Total Bili:  --  INR: --  Sodium: 137  MELD-Na  Dialysis at least twice in past week: Y/N?  Creatinine:  2.35  Total Bili:  --  INR: --  Sodium: 136      Culture - Blood (collected 28 Jan 2023 11:15)  Source: .Blood Blood-Peripheral  Gram Stain (29 Jan 2023 05:11):    Growth in aerobic bottle: Gram Negative Rods and Gram positive cocci in    pairs    Growth in anaerobic bottle: Gram Negative Rods and Gram positive cocci in    pairs  Preliminary Report (29 Jan 2023 20:58):    Growth in aerobic and anaerobic bottles: Klebsiella pneumoniae    Growth in anaerobic bottle: Enterococcus faecalis    Growth in aerobic bottle: Gram positive cocci in pairs    ***Blood Panel PCR results on this specimen are available    approximately 3 hours after the Gram stain result.***    Gram stain, PCR, and/or culture results may not always    correspond due to difference in methodologies.    ************************************************************    This PCR assay was performed by multiplex PCR. This    Assay tests for 66 bacterial and resistance gene targets.    Please refer to the [facility] test directory    at [email or web address] for details.  Organism: Blood Culture PCR  Klebsiella pneumoniae (30 Jan 2023 09:22)  Organism: Klebsiella pneumoniae (30 Jan 2023 09:22)  Organism: Blood Culture PCR (29 Jan 2023 03:13)    Culture - Blood (collected 28 Jan 2023 10:08)  Source: .Blood Blood-Peripheral  Preliminary Report (29 Jan 2023 17:01):    No growth to date.      IMAGING: I personally reviewed the [XXXXXX], and I agree with the radiologist's interpretation as described below:   Chief Complaint: This is a 88y old woman patient being seen in follow-up consultation for melena.     Interval HPI / 24H events:  Patient is seen ans examined in ICU bed, reports back pain. Reports 8 episodes of BRBPR overnight per ICU nurse. Her hemoglobin 7.2 this morning and received another unit of PRBC after this.  On low dose of Levophed. S/p EGD yesterday, showed Duodenal ulcer with visible vessel, s/p Endoclip x2.   Patient denies abdominal pain, nausea, vomiting, chest pian, palpitation, shortness of breath.     Review of Systems:  . Constitutional: No fever, chills  . HEENT: Negative  · Respiratory and Thorax: No shortness of breath,   · Cardiovascular: No chest pain, palpitation, no dizziness,   · Gastrointestinal: see above.  · Genitourinary: No hematuria  · Musculoskeletal: Negative  · Neurological: no headache,      PAST MEDICAL/SURGICAL HISTORY:  Hypertension    History of Hyperthyroidism    Hyperlipemia    Diabetes mellitus type II    S/P TKR (total knee replacement)  left knee    Carpal tunnel syndrome on both sides    History of cataract extraction  bilateral    H/O total hysterectomy    History of laminectomy      MEDICATIONS  (STANDING):  chlorhexidine 2% Cloths 1 Application(s) Topical <User Schedule>  dextrose 5%. 1000 milliLiter(s) (100 mL/Hr) IV Continuous <Continuous>  dextrose 5%. 1000 milliLiter(s) (50 mL/Hr) IV Continuous <Continuous>  dextrose 50% Injectable 25 Gram(s) IV Push once  dextrose 50% Injectable 12.5 Gram(s) IV Push once  dextrose 50% Injectable 25 Gram(s) IV Push once  glucagon  Injectable 1 milliGRAM(s) IntraMuscular once  influenza  Vaccine (HIGH DOSE) 0.7 milliLiter(s) IntraMuscular once  insulin lispro (ADMELOG) corrective regimen sliding scale   SubCutaneous every 6 hours  levothyroxine Injectable 100 MICROGram(s) IV Push at bedtime  midodrine 10 milliGRAM(s) Oral every 8 hours  norepinephrine Infusion 0.05 MICROgram(s)/kG/Min (8.93 mL/Hr) IV Continuous <Continuous>  pantoprazole Infusion 8 mG/Hr (10 mL/Hr) IV Continuous <Continuous>  piperacillin/tazobactam IVPB.. 3.375 Gram(s) IV Intermittent every 12 hours    MEDICATIONS  (PRN):  dextrose Oral Gel 15 Gram(s) Oral once PRN Blood Glucose LESS THAN 70 milliGRAM(s)/deciliter  melatonin 5 milliGRAM(s) Oral at bedtime PRN Sleep    No Known Allergies    T(C): 35.8 (01-30-23 @ 09:45), Max: 36.9 (01-29-23 @ 15:00)  HR: 75 (01-30-23 @ 09:45) (73 - 120)  BP: 106/65 (01-30-23 @ 09:45) (67/56 - 141/98)  RR: 13 (01-30-23 @ 09:45) (11 - 27)  SpO2: 98% (01-30-23 @ 09:45) (92% - 100%)    I&O's Summary    29 Jan 2023 07:01  -  30 Jan 2023 07:00  --------------------------------------------------------  IN: 1915.9 mL / OUT: 1025 mL / NET: 890.9 mL    30 Jan 2023 07:01  -  30 Jan 2023 10:12  --------------------------------------------------------  IN: 71.2 mL / OUT: 110 mL / NET: -38.8 mL      PHYSICAL EXAM:    Constitutional: Pale appearing, No acute distress  Neuro: Awake alert, oriented to person, place and situation,   HEENT: PERRL, anicteric sclerae,   Neck: supple, no JVD  CV: regular rate, regular rhythm, +S1S2,   Pulm/chest: lung sounds diminished bilaterally.    Abd: soft, NT, ND, +BS  Ext: No Cyanosis, clubbing. + BLE and BUE edema   Skin:  no jaundice   Psych: calm, appropriate affect      LABS:               7.2    10.38 )-----------( 173      ( 01-30 @ 03:40 )             20.9                7.9    10.78 )-----------( 167      ( 01-29 @ 21:30 )             23.8                7.9    10.19 )-----------( 181      ( 01-29 @ 18:11 )             23.1                6.7    15.40 )-----------( 200      ( 01-29 @ 10:16 )             20.4                7.6    10.92 )-----------( 169      ( 01-29 @ 04:11 )             23.5                9.2    11.16 )-----------( 174      ( 01-28 @ 19:57 )             29.7                6.7    8.79  )-----------( 188      ( 01-28 @ 10:08 )             22.3       01-30    137  |  106  |  89.1<H>  ----------------------------<  158<H>  5.1   |  21.0<L>  |  2.46<H>    Ca    8.6      30 Jan 2023 03:40  Phos  3.6     01-30  Mg     1.9     01-30          Culture - Blood (collected 28 Jan 2023 11:15)  Source: .Blood Blood-Peripheral  Gram Stain (29 Jan 2023 05:11):    Growth in aerobic bottle: Gram Negative Rods and Gram positive cocci in    pairs    Growth in anaerobic bottle: Gram Negative Rods and Gram positive cocci in    pairs  Preliminary Report (29 Jan 2023 20:58):    Growth in aerobic and anaerobic bottles: Klebsiella pneumoniae    Growth in anaerobic bottle: Enterococcus faecalis    Growth in aerobic bottle: Gram positive cocci in pairs    ***Blood Panel PCR results on this specimen are available    approximately 3 hours after the Gram stain result.***    Gram stain, PCR, and/or culture results may not always    correspond due to difference in methodologies.    ************************************************************    This PCR assay was performed by multiplex PCR. This    Assay tests for 66 bacterial and resistance gene targets.    Please refer to the [facility] test directory    at [email or web address] for details.  Organism: Blood Culture PCR  Klebsiella pneumoniae (30 Jan 2023 09:22)  Organism: Klebsiella pneumoniae (30 Jan 2023 09:22)  Organism: Blood Culture PCR (29 Jan 2023 03:13)    Culture - Blood (collected 28 Jan 2023 10:08)  Source: .Blood Blood-Peripheral  Preliminary Report (29 Jan 2023 17:01):    No growth to date.    < from: EGD (01.29.23 @ 07:26) >    Findings:        Esophagus Mucosa Normal mucosa was noted in the whole esophagus.        Stomach Mucosa Normal mucosa was noted in the stomach.        Duodenum Excavated lesions A cratered non-bleeding 8 mm ulcer was found in the    second part of the duodenum.        A single cratered 10 mm ulcer was found in the second part of the duodenal. A    visible vessel suggested recent bleeding. Bi-cap electrocautery was successfully    applied. Two endoclip was successfully applied for the purpose of hemostasis.        Mucosa Normal mucosa was noted in the First part of the duodenum.        Other Interventions:        Impressions:        Normal mucosa in the gastroesophageal junction and whole esophagus.        Normal mucosa in the stomach.        Normal mucosa in the First part of the duodenum.        Ulcer in the second part of the duodenum.        Ulcer in the second part of the duodenal. (Thermal Therapy, Endoclip).        Plan:        Admit to Hospital        Continue current medical regimen.        IV PPI drip for additional 48 hours followed by PO PPI BID for 12 weeks.        Monitor Hb and keep the target hb at 8mg/dl        Stool for H Pylori antigen        Avoid NSAIDs and antiplatelet therapy at this time        Clearliquid diet today        If she has recurrent bleeding suggest to consult IR for embolization.        Specimens:        Additional Notes:        Pathology:        Attending Participation:        Jacob Coyle MD    < end of copied text >

## 2023-01-30 NOTE — DIETITIAN INITIAL EVALUATION ADULT - OTHER INFO
89 y/o F hx of aortic valve repair, CAD on ASA/plavix, HTN, DM presented with shortness of breath for the past 1 day. endorsing bloody bowel movements for the "past several weeks." Denies prior episodes of GI bleeding. was recently treated with z-pack for bronchitis per patient. denies prior colonoscopy/endoscopy in the past. Denies NSAID use.   In the ER, patient hypotensive with SBP in the 70s on arrival. Placed on 4L nasal cannula for shortness of breath and hypoxia by ER team. 1 unit of PRBC started aprox 1 hour ago, 2 units prbc in total. chest xray shows LLL PNA - ceftriaxone and vancomycin given. Potassium 6.8 w/ no acute ischemic changes on EKG. 10U IVP insulin, amp of d50, 2g calcium gluconate and albuterol given for hyperK treatment in the ER. guaiac positive, GI consulted and plan is for EGD tomorrow morning.

## 2023-01-30 NOTE — PROGRESS NOTE ADULT - ASSESSMENT
Impression - 87 y/o F with a h/o TAVR, CAD, on ASA/plavix, HTN, DM, CKD, admitted on 1/28 with complaints of shortness of breath x 1 day and endorsed recurrent bloody bowel movements for the past several weeks. H/H 6.7/22. Hypotensive. CXR suggestive of LLL pneumonia. Transfused 2u PRBC with good improvement in H/H. No evidence of active bleeding since admission to MICU, however her BP has continued to decline. Started on IV vasopressor. UA is positive. Blood cultures are growing gram negative rods and gram positive cocci in pairs. 1/29 EGD remarkable for Duodenal ulcers s/p endoclip x 2. Patient continued to have multiple episodes of melena/hematochezia overnight and taken to IR for coil embolization right gastroepiploic, SPDA, and GDA proper.     ABLA 2/2 GIB s/p IR coil embolization   DM  Hypotension    Impression - 87 y/o F with a h/o TAVR, CAD, on ASA/plavix, HTN, DM, CKD, admitted on 1/28 with complaints of shortness of breath x 1 day and endorsed recurrent bloody bowel movements for the past several weeks. H/H 6.7/22. Hypotensive. CXR suggestive of LLL pneumonia. Transfused 2u PRBC with good improvement in H/H. No evidence of active bleeding since admission to MICU, however her BP has continued to decline. Started on IV vasopressor. UA is positive. Blood cultures are growing gram negative rods and gram positive cocci in pairs. 1/29 EGD remarkable for Duodenal ulcers s/p endoclip x 2. Patient continued to have multiple episodes of melena/hematochezia overnight and taken to IR for coil embolization right gastroepiploic, SPDA, and GDA proper.     ABLA 2/2 GIB s/p IR coil embolization   PNA  DM  Hypotension   Klebsiella bacteremia   Hyperkalemia, improving   ANA CRISTINA on CKD    Neuro - Mentating at baseline, intermittently lethargic, but readily answering verbal cues, avoid deliriogenic agents  CV - Actively titrating levo gtt for MAP>65, HOLD DAPT in setting of GIB  Pulm - PNA on Zosyn, Legionella negative d/c azithromycin, SPO2 high 90s on RA, NC O2 PRN  GI - GIB s/p IR coil embolization, continue Protonix gtt, NPO except meds for now  Renal - ANA CRISTINA on CKD likely ischemic ATN in setting of hypoperfusion/hypotension 2/2 GIB, trend BUN/CTN, trend lytes, replete as needed, avoid nephrotoxic agents, Hyper K+ improved, thomas cath for strict I/Os, nephro following   Heme - HOLD chemical DVT PPx in setting of GIB, SCDs, serial CBCs, continue to transfuse as needed  Endocrine - ISS protocol, strict glycemic control w/BG<180, continue daily synthroid   ID - D/C azithromycin, continue Zosyn, BCx +Kleb, repeat BCx pending, leukocytosis improved

## 2023-01-30 NOTE — PROGRESS NOTE ADULT - ASSESSMENT
87 y/o F with a h/o TAVR, CAD, on ASA/ Plavix, HTN, DM, CKD admitted with Acute blood loss anemia, septic shock, Gram negative laura/GPC pairs bacteremia.     Acute blood loss anemia:  S/p EGD yesterday showed duodenal ulcer with visible vessel,  s/p Endoclip x2   Now continue to have rectal bleed, hemoglobin 7.2 this morning.   Continue Protonix  Transfused another unit this morning, will recommend another units given multiple episodes of hematochezia, hypotensive requiring vasopressors  Albumin 25% x1 dose in setting of third spacing  Discussed with intervention radiologist Dr. Bermudez for possible IR embolization  Keep NPO  Continue to trend CBC,   Plan discussed with the patient ands her son Juan at the bedside.    ICU team made aware.

## 2023-01-30 NOTE — PROCEDURE NOTE - PROCEDURE FINDINGS AND DETAILS
endo shows duodenal ulcer, clips placed    right groin 5f cath   coil embo right gastroepiploic, SPDA, and GDA proper.    closed with angioseal

## 2023-01-30 NOTE — CHART NOTE - NSCHARTNOTEFT_GEN_A_CORE
updated Son Austin regarding the possible need to CTA abdomen to locate bleed, and explain the risk of possible kidney injury given patient has underlying CKD.   Son unable to decide at this time, states that he would like to discuss with patient's outpatient renal doctor.  However, he agrees that if we believe this is emergency,  and need the contrast, he is agreeable to it.   Patient currently not having new bleed, so will hold off cta abd for now.  But if she bleed again, then will obtain CT angio abdomen.  Son in agreement.

## 2023-01-30 NOTE — PROGRESS NOTE ADULT - SUBJECTIVE AND OBJECTIVE BOX
On levophed + pantoprazole gtt. Sleeping but wakes up to voice. Stated that she feels "unwell". Non oliguric.     Vital Signs Last 24 Hrs  T(C): 35.9 (30 Jan 2023 11:00), Max: 36.9 (29 Jan 2023 15:00)  T(F): 96.6 (30 Jan 2023 11:00), Max: 98.4 (29 Jan 2023 15:00)  HR: 74 (30 Jan 2023 11:00) (73 - 120)  BP: 90/51 (30 Jan 2023 11:00) (67/56 - 141/98)  BP(mean): 63 (30 Jan 2023 11:00) (52 - 120)  RR: 14 (30 Jan 2023 11:00) (11 - 27)  SpO2: 100% (30 Jan 2023 11:00) (92% - 100%)    Parameters below as of 30 Jan 2023 11:00  Patient On (Oxygen Delivery Method): room air    I&O's Summary    29 Jan 2023 07:01  -  30 Jan 2023 07:00  --------------------------------------------------------  IN: 1915.9 mL / OUT: 1025 mL / NET: 890.9 mL    30 Jan 2023 07:01  -  30 Jan 2023 11:27  --------------------------------------------------------  IN: 646.6 mL / OUT: 270 mL / NET: 376.6 mL    Physical Exam  General: Pale appearing obese female in NAD  Cardiac: S1S2 RRR  Respiratory: CTAB  Abdomen: Soft, NT  Extremities: Anasarca   Neuro: Sleeping but wakes up to voice    01-30    137  |  106  |  89.1<H>  ----------------------------<  158<H>  5.1   |  21.0<L>  |  2.46<H>    Ca    8.6      30 Jan 2023 03:40  Phos  3.6     01-30  Mg     1.9     01-30                        7.2    10.38 )-----------( 173      ( 30 Jan 2023 03:40 )             20.9     MEDICATIONS  (STANDING):  chlorhexidine 2% Cloths 1 Application(s) Topical <User Schedule>  dextrose 5%. 1000 milliLiter(s) (100 mL/Hr) IV Continuous <Continuous>  dextrose 5%. 1000 milliLiter(s) (50 mL/Hr) IV Continuous <Continuous>  dextrose 50% Injectable 25 Gram(s) IV Push once  dextrose 50% Injectable 12.5 Gram(s) IV Push once  dextrose 50% Injectable 25 Gram(s) IV Push once  glucagon  Injectable 1 milliGRAM(s) IntraMuscular once  influenza  Vaccine (HIGH DOSE) 0.7 milliLiter(s) IntraMuscular once  insulin lispro (ADMELOG) corrective regimen sliding scale   SubCutaneous every 6 hours  levothyroxine Injectable 100 MICROGram(s) IV Push at bedtime  midodrine 10 milliGRAM(s) Oral every 8 hours  norepinephrine Infusion 0.05 MICROgram(s)/kG/Min (8.93 mL/Hr) IV Continuous <Continuous>  pantoprazole Infusion 8 mG/Hr (10 mL/Hr) IV Continuous <Continuous>  piperacillin/tazobactam IVPB.. 3.375 Gram(s) IV Intermittent every 12 hours    MEDICATIONS  (PRN):  dextrose Oral Gel 15 Gram(s) Oral once PRN Blood Glucose LESS THAN 70 milliGRAM(s)/deciliter  melatonin 5 milliGRAM(s) Oral at bedtime PRN Sleep

## 2023-01-30 NOTE — DIETITIAN INITIAL EVALUATION ADULT - ORAL INTAKE PTA/DIET HISTORY
Pt lethargic during assessment, limited hx obtained. Pt reported good po intake PTA. Reported increase in wt "since being in a wheelchair." Pt remains NPO at this time.

## 2023-01-30 NOTE — PROGRESS NOTE ADULT - NS PANP COMMENT GEN_ALL_CORE FT
88F w/ hx of TAVR, CAD, on ASA/plavix, HTN, DM, CKD, admitted on 1/28 with complaints of shortness of breath x 1 day and endorsed recurrent bloody bowel movements for the past several weeks found to be anemic significantly below baseline. Pt also found to be septic with CXR with possible PNA and found to be bacteremic with Klebsiella/Enteroccoccus. Pt underwent EGD 1/29 notbal for duodenal ulcers s/p thermal therapy and endoclip x2. Post EGD pt had ongoing episodes of melena with downtrending Hgb and continued pressor requirements. This AM, discussed with GI and given difficulty of accessing the area of the duodenal ulcer, IR intervention was deemed most appropriate. Son was made aware of the risk of contrast induced nephropathy worsening her pre-existing renal dysfunction but understands the need to control ongoing bleed. Pt underwent embolization w/ right gastroepiploic, SPDA, and GDA proper. CBC post procedure with appropriate response to prior transfusions this AM. Will continue PPI drip and trend CBC. Surgical consult if ongoing bleeding post embolization. Wean off levophed as tolerated. Follow-up surveillance cultures and Echo. Will need CT abd/pelvis with PO contrast/CT chest for evaluation of bacteremia. Pt also with hyperkalemia on presentation possibly in setting of GI bleed/blood transfusion now improved with lokelma.

## 2023-01-30 NOTE — PROCEDURE NOTE - NSINFORMCONSENT_GEN_A_CORE
If no improvement after 2 weeks, Patient instructed to see DR Eleazar Pittman for further evaluation. Benefits, risks, and possible complications of procedure explained to patient/caregiver who verbalized understanding and gave written consent.

## 2023-01-30 NOTE — DIETITIAN INITIAL EVALUATION ADULT - NUTRITON FOCUSED PHYSICAL EXAM
no... Complex Repair And Graft Additional Text (Will Appearing After The Standard Complex Repair Text): The complex repair was not sufficient to completely close the primary defect. The remaining additional defect was repaired with the graft mentioned below.

## 2023-01-30 NOTE — PROGRESS NOTE ADULT - SUBJECTIVE AND OBJECTIVE BOX
Patient is a 88y old  Female who presents with a chief complaint of Gastrointestinal hemorrhage     (2023 09:24)      BRIEF HOSPITAL COURSE: 89 y/o F with a h/o TAVR, CAD, on ASA/plavix, HTN, DM, CKD, admitted on  with complaints of shortness of breath x 1 day and endorsed recurrent bloody bowel movements for the past several weeks. H/H 6.7/22. Hypotensive. CXR suggestive of LLL pneumonia. Transfused 2u PRBC with good improvement in H/H. No evidence of active bleeding since admission to MICU, however her BP has continued to decline. Started on IV vasopressor. UA is positive. Blood cultures are growing gram negative rods and gram positive cocci in pairs.  EGD remarkable for Duodenal ulcers s/p endoclip x 2.       Events last 24 hours: Patient continued to have     PAST MEDICAL & SURGICAL HISTORY:  Hypertension      History of Hyperthyroidism      Hyperlipemia      Diabetes mellitus type II      S/P TKR (total knee replacement)  left knee      Carpal tunnel syndrome on both sides      History of cataract extraction  bilateral      H/O total hysterectomy      History of laminectomy            Medications:  piperacillin/tazobactam IVPB.. 3.375 Gram(s) IV Intermittent every 12 hours    midodrine 10 milliGRAM(s) Oral every 8 hours  norepinephrine Infusion 0.05 MICROgram(s)/kG/Min IV Continuous <Continuous>      melatonin 5 milliGRAM(s) Oral at bedtime PRN        pantoprazole Infusion 8 mG/Hr IV Continuous <Continuous>      dextrose 50% Injectable 25 Gram(s) IV Push once  dextrose 50% Injectable 12.5 Gram(s) IV Push once  dextrose 50% Injectable 25 Gram(s) IV Push once  dextrose Oral Gel 15 Gram(s) Oral once PRN  glucagon  Injectable 1 milliGRAM(s) IntraMuscular once  insulin lispro (ADMELOG) corrective regimen sliding scale   SubCutaneous every 6 hours  levothyroxine Injectable 100 MICROGram(s) IV Push at bedtime    dextrose 5%. 1000 milliLiter(s) IV Continuous <Continuous>  dextrose 5%. 1000 milliLiter(s) IV Continuous <Continuous>  sodium chloride 0.9% lock flush 10 milliLiter(s) IV Push every 1 hour PRN    influenza  Vaccine (HIGH DOSE) 0.7 milliLiter(s) IntraMuscular once    chlorhexidine 2% Cloths 1 Application(s) Topical <User Schedule>            ICU Vital Signs Last 24 Hrs  T(C): 35.9 (2023 11:00), Max: 36.9 (2023 15:00)  T(F): 96.6 (2023 11:00), Max: 98.4 (2023 15:00)  HR: 81 (2023 11:30) (73 - 102)  BP: 111/63 (2023 11:30) (67/56 - 127/103)  BP(mean): 77 (2023 11:30) (52 - 120)  ABP: --  ABP(mean): --  RR: 16 (2023 11:30) (11 - 27)  SpO2: 100% (2023 11:30) (92% - 100%)    O2 Parameters below as of 2023 11:30  Patient On (Oxygen Delivery Method): room air                I&O's Detail    2023 07:01  -  2023 07:00  --------------------------------------------------------  IN:    IV PiggyBack: 250 mL    IV PiggyBack: 250 mL    IV PiggyBack: 100 mL    Norepinephrine: 190.9 mL    Pantoprazole: 210 mL    Plasma: 190 mL    Platelets - Single Donor: 190 mL    PRBCs (Packed Red Blood Cells): 260 mL    PRBCs (Packed Red Blood Cells): 275 mL  Total IN: 1915.9 mL    OUT:    Indwelling Catheter - Urethral (mL): 1025 mL  Total OUT: 1025 mL    Total NET: 890.9 mL      2023 07:01  -  2023 14:56  --------------------------------------------------------  IN:    IV PiggyBack: 25 mL    IV PiggyBack: 250 mL    Norepinephrine: 21.6 mL    Pantoprazole: 50 mL    PRBCs (Packed Red Blood Cells): 300 mL  Total IN: 646.6 mL    OUT:    Indwelling Catheter - Urethral (mL): 345 mL  Total OUT: 345 mL    Total NET: 301.6 mL            LABS:                        7.2    10.38 )-----------( 173      ( 2023 03:40 )             20.9         137  |  106  |  89.1<H>  ----------------------------<  158<H>  5.1   |  21.0<L>  |  2.46<H>    Ca    8.6      2023 03:40  Phos  3.6       Mg     1.9                 CAPILLARY BLOOD GLUCOSE      POCT Blood Glucose.: 176 mg/dL (2023 11:29)      Urinalysis Basic - ( 2023 04:11 )    Color: Yellow / Appearance: Clear / S.015 / pH: x  Gluc: x / Ketone: Negative  / Bili: Negative / Urobili: Negative mg/dL   Blood: x / Protein: Negative / Nitrite: Negative   Leuk Esterase: Moderate / RBC: 3-5 /HPF / WBC 26-50 /HPF   Sq Epi: x / Non Sq Epi: Few / Bacteria: Few      CULTURES:  Culture Results:   Growth in aerobic and anaerobic bottles: Klebsiella pneumoniae  Growth in anaerobic bottle: Enterococcus faecalis  Growth in aerobic bottle: Gram positive cocci in pairs  ***Blood Panel PCR results on this specimen are available  approximately 3 hours after the Gram stain result.***  Gram stain, PCR, and/or culture results may not always  correspond due to difference in methodologies.  ************************************************************  This PCR assay was performed by multiplex PCR. This  Assay tests for 66 bacterial and resistance gene targets.  Please refer to the [facility] test directory  at [email or web address] for details. ( @ 11:15)  Rapid RVP Result: NotDetec ( @ 10:08)  Culture Results:   No growth to date. ( @ 10:08)      Physical Examination:    General: No acute distress.      HEENT: Pupils equal, reactive to light.  Symmetric.    PULM: Clear to auscultation bilaterally, no significant sputum production    NECK: Supple, no lymphadenopathy, trachea midline    CVS: Regular rate and rhythm, no murmurs, rubs, or gallops    ABD: Soft, nondistended, nontender, normoactive bowel sounds, no masses    EXT: No edema, nontender    SKIN: Warm and well perfused, no rashes noted.    NEURO: Alert, oriented, interactive, nonfocal    DEVICES:     RADIOLOGY: ***    CRITICAL CARE TIME SPENT: ***   Patient is a 88y old  Female who presents with a chief complaint of Gastrointestinal hemorrhage     (2023 09:24)      BRIEF HOSPITAL COURSE: 89 y/o F with a h/o TAVR, CAD, on ASA/plavix, HTN, DM, CKD, admitted on  with complaints of shortness of breath x 1 day and endorsed recurrent bloody bowel movements for the past several weeks. H/H 6.7/22. Hypotensive. CXR suggestive of LLL pneumonia. Transfused 2u PRBC with good improvement in H/H. No evidence of active bleeding since admission to MICU, however her BP has continued to decline. Started on IV vasopressor. UA is positive. Blood cultures are growing gram negative rods and gram positive cocci in pairs.  EGD remarkable for Duodenal ulcers s/p endoclip x 2.       Events last 24 hours: Patient continued to have multiple episodes of melena/hematochezia overnight and taken to IR for embolization.      PAST MEDICAL & SURGICAL HISTORY:  Hypertension      History of Hyperthyroidism      Hyperlipemia      Diabetes mellitus type II      S/P TKR (total knee replacement)  left knee      Carpal tunnel syndrome on both sides      History of cataract extraction  bilateral      H/O total hysterectomy      History of laminectomy            Medications:  piperacillin/tazobactam IVPB.. 3.375 Gram(s) IV Intermittent every 12 hours    midodrine 10 milliGRAM(s) Oral every 8 hours  norepinephrine Infusion 0.05 MICROgram(s)/kG/Min IV Continuous <Continuous>      melatonin 5 milliGRAM(s) Oral at bedtime PRN        pantoprazole Infusion 8 mG/Hr IV Continuous <Continuous>      dextrose 50% Injectable 25 Gram(s) IV Push once  dextrose 50% Injectable 12.5 Gram(s) IV Push once  dextrose 50% Injectable 25 Gram(s) IV Push once  dextrose Oral Gel 15 Gram(s) Oral once PRN  glucagon  Injectable 1 milliGRAM(s) IntraMuscular once  insulin lispro (ADMELOG) corrective regimen sliding scale   SubCutaneous every 6 hours  levothyroxine Injectable 100 MICROGram(s) IV Push at bedtime    dextrose 5%. 1000 milliLiter(s) IV Continuous <Continuous>  dextrose 5%. 1000 milliLiter(s) IV Continuous <Continuous>  sodium chloride 0.9% lock flush 10 milliLiter(s) IV Push every 1 hour PRN    influenza  Vaccine (HIGH DOSE) 0.7 milliLiter(s) IntraMuscular once    chlorhexidine 2% Cloths 1 Application(s) Topical <User Schedule>            ICU Vital Signs Last 24 Hrs  T(C): 35.9 (2023 11:00), Max: 36.9 (2023 15:00)  T(F): 96.6 (2023 11:00), Max: 98.4 (2023 15:00)  HR: 81 (2023 11:30) (73 - 102)  BP: 111/63 (2023 11:30) (67/56 - 127/103)  BP(mean): 77 (2023 11:30) (52 - 120)  ABP: --  ABP(mean): --  RR: 16 (2023 11:30) (11 - 27)  SpO2: 100% (2023 11:30) (92% - 100%)    O2 Parameters below as of 2023 11:30  Patient On (Oxygen Delivery Method): room air                I&O's Detail    2023 07:01  -  2023 07:00  --------------------------------------------------------  IN:    IV PiggyBack: 250 mL    IV PiggyBack: 250 mL    IV PiggyBack: 100 mL    Norepinephrine: 190.9 mL    Pantoprazole: 210 mL    Plasma: 190 mL    Platelets - Single Donor: 190 mL    PRBCs (Packed Red Blood Cells): 260 mL    PRBCs (Packed Red Blood Cells): 275 mL  Total IN: 1915.9 mL    OUT:    Indwelling Catheter - Urethral (mL): 1025 mL  Total OUT: 1025 mL    Total NET: 890.9 mL      2023 07:01  -  2023 14:56  --------------------------------------------------------  IN:    IV PiggyBack: 25 mL    IV PiggyBack: 250 mL    Norepinephrine: 21.6 mL    Pantoprazole: 50 mL    PRBCs (Packed Red Blood Cells): 300 mL  Total IN: 646.6 mL    OUT:    Indwelling Catheter - Urethral (mL): 345 mL  Total OUT: 345 mL    Total NET: 301.6 mL            LABS:                        7.2    10.38 )-----------( 173      ( 2023 03:40 )             20.9         137  |  106  |  89.1<H>  ----------------------------<  158<H>  5.1   |  21.0<L>  |  2.46<H>    Ca    8.6      2023 03:40  Phos  3.6       Mg     1.9                 CAPILLARY BLOOD GLUCOSE      POCT Blood Glucose.: 176 mg/dL (2023 11:29)      Urinalysis Basic - ( 2023 04:11 )    Color: Yellow / Appearance: Clear / S.015 / pH: x  Gluc: x / Ketone: Negative  / Bili: Negative / Urobili: Negative mg/dL   Blood: x / Protein: Negative / Nitrite: Negative   Leuk Esterase: Moderate / RBC: 3-5 /HPF / WBC 26-50 /HPF   Sq Epi: x / Non Sq Epi: Few / Bacteria: Few      CULTURES:  Culture Results:   Growth in aerobic and anaerobic bottles: Klebsiella pneumoniae  Growth in anaerobic bottle: Enterococcus faecalis  Growth in aerobic bottle: Gram positive cocci in pairs  ***Blood Panel PCR results on this specimen are available  approximately 3 hours after the Gram stain result.***  Gram stain, PCR, and/or culture results may not always  correspond due to difference in methodologies.  ************************************************************  This PCR assay was performed by multiplex PCR. This  Assay tests for 66 bacterial and resistance gene targets.  Please refer to the [facility] test directory  at [email or web address] for details. ( @ 11:15)  Rapid RVP Result: NotDetec ( @ 10:08)  Culture Results:   No growth to date. ( @ 10:08)      Physical Examination:    General: No acute distress.      HEENT: Pupils equal, reactive to light.  Symmetric.    PULM: Clear to auscultation bilaterally, no significant sputum production    NECK: Supple, no lymphadenopathy, trachea midline    CVS: Regular rate and rhythm, no murmurs, rubs, or gallops    ABD: Soft, nondistended, nontender, normoactive bowel sounds, no masses    EXT: No edema, nontender    SKIN: Warm and well perfused, no rashes noted.    NEURO: Alert, oriented, interactive, nonfocal    DEVICES:     RADIOLOGY: ***    CRITICAL CARE TIME SPENT: ***   Patient is a 88y old  Female who presents with a chief complaint of Gastrointestinal hemorrhage     (2023 09:24)      BRIEF HOSPITAL COURSE: 89 y/o F with a h/o TAVR, CAD, on ASA/plavix, HTN, DM, CKD, admitted on  with complaints of shortness of breath x 1 day and endorsed recurrent bloody bowel movements for the past several weeks. H/H 6.7/22. Hypotensive. CXR suggestive of LLL pneumonia. Transfused 2u PRBC with good improvement in H/H. No evidence of active bleeding since admission to MICU, however her BP has continued to decline. Started on IV vasopressor. UA is positive. Blood cultures are growing gram negative rods and gram positive cocci in pairs.  EGD remarkable for Duodenal ulcers s/p endoclip x 2.     Events last 24 hours: Patient continued to have multiple episodes of melena/hematochezia overnight and taken to IR for coil embolization right gastroepiploic, SPDA, and GDA proper.     PAST MEDICAL & SURGICAL HISTORY:  Hypertension      History of Hyperthyroidism      Hyperlipemia      Diabetes mellitus type II      S/P TKR (total knee replacement)  left knee      Carpal tunnel syndrome on both sides      History of cataract extraction  bilateral      H/O total hysterectomy      History of laminectomy            Medications:  piperacillin/tazobactam IVPB.. 3.375 Gram(s) IV Intermittent every 12 hours    midodrine 10 milliGRAM(s) Oral every 8 hours  norepinephrine Infusion 0.05 MICROgram(s)/kG/Min IV Continuous <Continuous>      melatonin 5 milliGRAM(s) Oral at bedtime PRN        pantoprazole Infusion 8 mG/Hr IV Continuous <Continuous>      dextrose 50% Injectable 25 Gram(s) IV Push once  dextrose 50% Injectable 12.5 Gram(s) IV Push once  dextrose 50% Injectable 25 Gram(s) IV Push once  dextrose Oral Gel 15 Gram(s) Oral once PRN  glucagon  Injectable 1 milliGRAM(s) IntraMuscular once  insulin lispro (ADMELOG) corrective regimen sliding scale   SubCutaneous every 6 hours  levothyroxine Injectable 100 MICROGram(s) IV Push at bedtime    dextrose 5%. 1000 milliLiter(s) IV Continuous <Continuous>  dextrose 5%. 1000 milliLiter(s) IV Continuous <Continuous>  sodium chloride 0.9% lock flush 10 milliLiter(s) IV Push every 1 hour PRN    influenza  Vaccine (HIGH DOSE) 0.7 milliLiter(s) IntraMuscular once    chlorhexidine 2% Cloths 1 Application(s) Topical <User Schedule>            ICU Vital Signs Last 24 Hrs  T(C): 35.9 (2023 11:00), Max: 36.9 (2023 15:00)  T(F): 96.6 (2023 11:00), Max: 98.4 (2023 15:00)  HR: 81 (2023 11:30) (73 - 102)  BP: 111/63 (2023 11:30) (67/56 - 127/103)  BP(mean): 77 (2023 11:30) (52 - 120)  ABP: --  ABP(mean): --  RR: 16 (2023 11:30) (11 - 27)  SpO2: 100% (2023 11:30) (92% - 100%)    O2 Parameters below as of 2023 11:30  Patient On (Oxygen Delivery Method): room air                I&O's Detail    2023 07:01  -  2023 07:00  --------------------------------------------------------  IN:    IV PiggyBack: 250 mL    IV PiggyBack: 250 mL    IV PiggyBack: 100 mL    Norepinephrine: 190.9 mL    Pantoprazole: 210 mL    Plasma: 190 mL    Platelets - Single Donor: 190 mL    PRBCs (Packed Red Blood Cells): 260 mL    PRBCs (Packed Red Blood Cells): 275 mL  Total IN: 1915.9 mL    OUT:    Indwelling Catheter - Urethral (mL): 1025 mL  Total OUT: 1025 mL    Total NET: 890.9 mL      2023 07:01  -  2023 14:56  --------------------------------------------------------  IN:    IV PiggyBack: 25 mL    IV PiggyBack: 250 mL    Norepinephrine: 21.6 mL    Pantoprazole: 50 mL    PRBCs (Packed Red Blood Cells): 300 mL  Total IN: 646.6 mL    OUT:    Indwelling Catheter - Urethral (mL): 345 mL  Total OUT: 345 mL    Total NET: 301.6 mL            LABS:                        7.2    10.38 )-----------( 173      ( 2023 03:40 )             20.9         137  |  106  |  89.1<H>  ----------------------------<  158<H>  5.1   |  21.0<L>  |  2.46<H>    Ca    8.6      2023 03:40  Phos  3.6       Mg     1.9                 CAPILLARY BLOOD GLUCOSE      POCT Blood Glucose.: 176 mg/dL (2023 11:29)      Urinalysis Basic - ( 2023 04:11 )    Color: Yellow / Appearance: Clear / S.015 / pH: x  Gluc: x / Ketone: Negative  / Bili: Negative / Urobili: Negative mg/dL   Blood: x / Protein: Negative / Nitrite: Negative   Leuk Esterase: Moderate / RBC: 3-5 /HPF / WBC 26-50 /HPF   Sq Epi: x / Non Sq Epi: Few / Bacteria: Few      CULTURES:  Culture Results:   Growth in aerobic and anaerobic bottles: Klebsiella pneumoniae  Growth in anaerobic bottle: Enterococcus faecalis  Growth in aerobic bottle: Gram positive cocci in pairs  ***Blood Panel PCR results on this specimen are available  approximately 3 hours after the Gram stain result.***  Gram stain, PCR, and/or culture results may not always  correspond due to difference in methodologies.  ************************************************************  This PCR assay was performed by multiplex PCR. This  Assay tests for 66 bacterial and resistance gene targets.  Please refer to the [facility] Labs test directory  at [email or web address] for details. ( @ 11:15)  Rapid RVP Result: NotDetec ( @ 10:08)  Culture Results:   No growth to date. ( @ 10:08)      Physical Examination:    General: No acute distress. RT IJ CVC, +thomas     HEENT: EOMI, pupils equal, reactive to light.  Symmetric.    PULM: Clear to auscultation bilaterally, no significant sputum production    CVS: Regular rate and rhythm, no murmurs, rubs, or gallops    ABD: Soft, nondistended, nontender, normoactive bowel sounds, no masses    EXT: No edema, nontender    SKIN: Warm and well perfused, pale.    NEURO: A&Ox3, cranial nerves grossly intact, no focal deficits    DEVICES:     RADIOLOGY:     Critical Care time: 35 mins assessing presenting problems of acute illness that poses high probability of life threatening deterioration or end organ damage/dysfunction.  Medical decision making including initiating plan of care, reviewing data, reviewing radiology, direct patient bedside evaluation and interpretation of vital signs, any necessary ventilator management, discussion with multidisciplinary team, discussing goals of care with patient/family, all non inclusive of procedures

## 2023-01-30 NOTE — PROCEDURE NOTE - NSPROCDETAILS_GEN_ALL_CORE
lumen(s) aspirated and flushed/sterile technique, catheter placed/ultrasound guidance with use of sterile gel and probe cove

## 2023-01-31 NOTE — PROGRESS NOTE ADULT - ATTENDING COMMENTS
89 yo female with hx of AS s/p TAVR, CAD, DM, CKD, presented with GI bleed, underwent EGD along with IR embolization, also found to have UTI, polymicrobial bacteremia.    Today with pulmonary edema/ acute decompensated heart failure, will diurese, obtain echo. 87 yo female with hx of AS s/p TAVR, CAD, DM, CKD, presented with GI bleed, underwent EGD along with IR embolization, also found to have UTI, polymicrobial bacteremia.    Today with pulmonary edema/ acute decompensated heart failure, will diurese, echo pending.  ID consulted for bacteremia.

## 2023-01-31 NOTE — PROGRESS NOTE ADULT - SUBJECTIVE AND OBJECTIVE BOX
HPI/OVERNIGHT EVENTS:    88 y/oF hx of TAVR, CKD, CAD, on ASA/plavix, HTN, DM admitted on 1/28 for bloody bowel movements for the past 3 weeks, hyperkalemia and LLL PNA. Patient had EGD on 1/29 with 2 duodenal ulcers being clipped during the procedure. gram neg rods and gram positive cocci in pairs on blood cultures (klebsiella, enterococcus) , patient on vancomycin and zosyn for coverage. Continued episodes of bloody bowel movements s/p EGD. Subsequently went for IR embolization of R gastroepiploic, SPDA and GDA proper on 1/30/22.     MEDICATIONS  (STANDING):  albuterol/ipratropium for Nebulization 3 milliLiter(s) Nebulizer every 6 hours  chlorhexidine 2% Cloths 1 Application(s) Topical <User Schedule>  dextrose 50% Injectable 25 Gram(s) IV Push once  dextrose 50% Injectable 12.5 Gram(s) IV Push once  dextrose 50% Injectable 25 Gram(s) IV Push once  glucagon  Injectable 1 milliGRAM(s) IntraMuscular once  influenza  Vaccine (HIGH DOSE) 0.7 milliLiter(s) IntraMuscular once  insulin lispro (ADMELOG) corrective regimen sliding scale   SubCutaneous every 6 hours  levothyroxine Injectable 100 MICROGram(s) IV Push at bedtime  pantoprazole Infusion 8 mG/Hr (10 mL/Hr) IV Continuous <Continuous>  piperacillin/tazobactam IVPB.. 3.375 Gram(s) IV Intermittent every 12 hours  vancomycin  IVPB 750 milliGRAM(s) IV Intermittent <User Schedule>    MEDICATIONS  (PRN):  melatonin 5 milliGRAM(s) Oral at bedtime PRN Sleep  sodium chloride 0.9% lock flush 10 milliLiter(s) IV Push every 1 hour PRN Pre/post blood products, medications, blood draw, and to maintain line patency      Vital Signs Last 24 Hrs  T(C): 36.3 (31 Jan 2023 11:00), Max: 36.9 (31 Jan 2023 01:30)  T(F): 97.3 (31 Jan 2023 11:00), Max: 98.4 (31 Jan 2023 01:30)  HR: 100 (31 Jan 2023 11:00) (67 - 128)  BP: 105/73 (31 Jan 2023 11:00) (88/66 - 144/121)  BP(mean): 81 (31 Jan 2023 11:00) (53 - 130)  RR: 22 (31 Jan 2023 11:00) (12 - 27)  SpO2: 93% (31 Jan 2023 11:00) (83% - 100%)    Parameters below as of 31 Jan 2023 08:00  Patient On (Oxygen Delivery Method): nasal cannula  O2 Flow (L/min): 2      Constitutional: patient resting comfortably in bed, in no acute distress, pale appearing female   HEENT: EOMI / PERRL b/l, no active drainage or redness  Neck: No JVD, full ROM without pain  Respiratory: respirations are unlabored, no accessory muscle use, no conversational dyspnea  Cardiovascular: regular rate & rhythm  Gastrointestinal: Abdomen soft, non-tender, non-distended, no rebound tenderness / guarding  Neurological: GCS: 15 (4/5/6). A&O x 3; no gross sensory / motor / coordination deficits  Psychiatric: Normal mood, normal affect  Musculoskeletal: No joint pain, swelling or deformity; no limitation of movement      I&O's Detail    30 Jan 2023 07:01  -  31 Jan 2023 07:00  --------------------------------------------------------  IN:    IV PiggyBack: 200 mL    IV PiggyBack: 250 mL    IV PiggyBack: 100 mL    Norepinephrine: 116.1 mL    Oral Fluid: 240 mL    Pantoprazole: 240 mL    PRBCs (Packed Red Blood Cells): 300 mL  Total IN: 1446.1 mL    OUT:    Indwelling Catheter - Urethral (mL): 1540 mL  Total OUT: 1540 mL    Total NET: -93.9 mL          LABS:                        9.5    13.66 )-----------( 177      ( 31 Jan 2023 04:00 )             28.6     01-31    142  |  107  |  76.7<H>  ----------------------------<  140<H>  4.4   |  21.0<L>  |  2.33<H>    Ca    8.7      31 Jan 2023 04:00  Phos  4.1     01-31  Mg     2.0     01-31

## 2023-01-31 NOTE — PHYSICAL THERAPY INITIAL EVALUATION ADULT - PERTINENT HX OF CURRENT PROBLEM, REHAB EVAL
9 y/o F with a h/o TAVR, CAD, on ASA/plavix, HTN, DM, CKD, admitted on 1/28 with complaints of shortness of breath x 1 day and endorsed recurrent bloody bowel movements for the past several weeks. H/H 6.7/22. Hypotensive. CXR suggestive of LLL pneumonia. Transfused 2u PRBC with good improvement in H/H. No evidence of active bleeding since admission to MICU, however her BP has continued to decline. Started on IV vasopressor. UA is positive. Blood cultures are growing gram negative rods and gram positive cocci in pairs. 1/29 EGD remarkable for Duodenal ulcers s/p IR coil embolization right gastroepiploic

## 2023-01-31 NOTE — PHYSICAL THERAPY INITIAL EVALUATION ADULT - CRITERIA FOR SKILLED THERAPEUTIC INTERVENTIONS
Home with 24/7 assist, transfers only to w/c, Home PT; Consider [facility] if family/aide unable to provided needed level of assist at d/c./impairments found/rehab potential/anticipated discharge recommendation

## 2023-01-31 NOTE — SWALLOW BEDSIDE ASSESSMENT ADULT - SWALLOW EVAL: DIAGNOSIS
Pt's ruth-pharyngeal swallow clinically judged to be grossly WFL. Oral phase notable for min increased mastication time for regular solids 2' incomplete dentition however, remained functional.  Pharyngeal phase marked by +cough w/ large consecutive cup sips of thin liquids, no further overt s/s of penetration/aspiration observed when educated on small single sips.

## 2023-01-31 NOTE — CONSULT NOTE ADULT - ASSESSMENT
88y  Female with h/o TAVR, CAD, on ASA/plavix, HTN, DM, CKD. Patient was admitted on 1/28 with complaints of shortness of breath x 1 day and bloody bowel movements for the past several weeks. Patient was found to have H/H 6.7/22, Hypotensive. CXR suggestive of LLL pneumonia. Patient did not have evidence of active bleeding since admission to MICU. Started on IV vasopressor due to hypotension. Blood cultures are growing gram negative rods and gram positive cocci in pairs. 1/29 EGD remarkable for Duodenal ulcers s/p endoclip x 2. Patient continued to have multiple episodes of melena/hematochezia and taken to IR for coil embolization right gastroepiploic, SPDA, and GDA proper 1/30. Patient has been on Vancomycin and Zosyn since admission.        Klebsiella pneumoniae Bacteremia/Sepsis  Enterococcus faecalis  Bacteremia/sepsis  GI Bleed  Acute blood loss anemia   Leukocytosis   h/o TAVR         - Blood cultures 1/28 reporting Klebsiella pneumoniae, Enterococcus faecalis and 1 of 4 bottles with CoNS  - Repeat blood cultures pending  - CoNS likely contamination   - RVP/COVID 19 PCR 1/28 negative   - Check CT A/P  - Check TTE   - Procalcitonin level 0.46  - Continue Zosyn  - Continue Vancomycin  - Monitor trough  - Monitor for Vancomycin toxicity   - Vancomycin required at this time pending culture results  - Follow up cultures  - Trend Fever  - Trend WBC      Will Follow    d/w MICU team

## 2023-01-31 NOTE — PHYSICAL THERAPY INITIAL EVALUATION ADULT - NSPTDISCHREC_GEN_A_CORE
Home with 24/7 assist, transfers only to w/c, Home PT; Consider KUMAR if family/aide unable to provided needed level of assist at d/c.

## 2023-01-31 NOTE — PROGRESS NOTE ADULT - ASSESSMENT
88 y/oF hx of TAVR, CKD, CAD, on ASA/plavix, HTN, DM admitted on 1/28 for bloody bowel movements for the past 3 weeks, hyperkalemia and LLL PNA.    Neuro: A&ox4  CV: patient titrated off levophed, no pressor requirements currently, maintain MAP>65. Anticoagulation held due to GI bleed.   Pulm: LLL PNA, Patient on 2L nasal cannula overnight, will wean off to room air if possible. Tachypneic this AM w/ mild peripheral edema, will give 80mg lasix for diuresis, possible volume overload from pRBC transfusions/fluids.   GI: Continue protonix infusion for GI bleed. Trend CBC, last hemoglobin 9.5. goal >8.0. Will monitor for GI bleeding.   ID: Continue vancomycin and zosyn for bacteremia. ID consulted and is following patient. Repeat blood cultures.   Endocrine: ISS  Renal: ANA CRISTINA on CKD, Cr around patient's baseline/prior Cr of 2.2-2.4 range.   Heme: Trend CBC in setting of GI bleed. Goal Hb >8.0. Hold off on anticoagulation in setting of GI bleed.   Code Status: DNR/DNI

## 2023-01-31 NOTE — SWALLOW BEDSIDE ASSESSMENT ADULT - PHARYNGEAL PHASE
Within functional limits +cough w/ large consecutive cup sips, eliminated w/ small single sips./Within functional limits

## 2023-01-31 NOTE — PHYSICAL THERAPY INITIAL EVALUATION ADULT - ADDITIONAL COMMENTS
Pt lives in a private home with her son (works days) and has an aide during the day. No steps to navigate, pt has a ramp to enter her home. Pt's son assists with transfers to/from w/c and pt states she can ambulate short distances with RW and assist. Aide assists pt with ADLs including bathing using a shower chair. Pt owns RW, w/c, and shower chair.

## 2023-01-31 NOTE — CONSULT NOTE ADULT - SUBJECTIVE AND OBJECTIVE BOX
[facility]  INFECTIOUS DISEASES at [place] and [place]  =======================================================                               Luis Villarreal MD#   Melba Marquis MD*                             Niki Carroll MD*   Kiana Kam MD*            Diplomates American Board of Internal Medicine & Infectious Diseases                # [place] Office - Appt - Tel  [phone] Fax [phone]                * [place] Office - Appt - Tel [phone] Fax [phone]                                  Hospital Consult line:  [phone]  =======================================================      [id number]  TATE CHEN    CC: Patient is a 88y old  Female who presents with a chief complaint of GIB (30 Jan 2023 14:56)      88y  Female with h/o TAVR, CAD, on ASA/plavix, HTN, DM, CKD. Patient was admitted on 1/28 with complaints of shortness of breath x 1 day and bloody bowel movements for the past several weeks. Patient was found to have H/H 6.7/22, Hypotensive. CXR suggestive of LLL pneumonia. Patient did not have evidence of active bleeding since admission to MICU. Started on IV vasopressor due to hypotension. Blood cultures are growing gram negative rods and gram positive cocci in pairs. 1/29 EGD remarkable for Duodenal ulcers s/p endoclip x 2. Patient continued to have multiple episodes of melena/hematochezia and taken to IR for coil embolization right gastroepiploic, SPDA, and GDA proper 1/30. Patient has been on Vancomycin and Zosyn.  ID input requested.         Past Medical & Surgical Hx:  Hypertension  History of Hyperthyroidism  Hyperlipemia  Diabetes mellitus type II  S/P TKR (total knee replacement) left knee  Carpal tunnel syndrome on both sides  History of cataract extraction bilateral  H/O total hysterectomy  History of laminectomy      Social Hx:  Denies smoking  Lives with Son       FAMILY HISTORY:  Diabetes mellitus type II - Son       Allergies  No Known Allergies      REVIEW OF SYSTEMS:  CONSTITUTIONAL:  No Fever or chills  HEENT:  No diplopia or blurred vision.  No earache, sore throat or runny nose.  CARDIOVASCULAR:  No chest pain  RESPIRATORY:  No cough, shortness of breath  GASTROINTESTINAL:  No nausea, vomiting or diarrhea.  GENITOURINARY:  No dysuria, frequency or urgency. No Blood in urine  MUSCULOSKELETAL:  no joint aches, no muscle pain  SKIN:  No change in skin, hair or nails.  NEUROLOGIC:  No Headaches, seizures  PSYCHIATRIC:  No disorder of thought or mood.  ENDOCRINE:  No heat or cold intolerance  HEMATOLOGICAL:  No easy bruising or bleeding.       Physical Exam:  GEN: NAD  HEENT: normocephalic and atraumatic. EOMI. PERRL.    NECK: Supple.   LUNGS: CTA B/L.  HEART: RRR  ABDOMEN: Soft, NT, ND.  +BS.    : No CVA tenderness  EXTREMITIES: Without  edema.  MSK: No joint swelling  NEUROLOGIC: No Focal Deficits   PSYCHIATRIC: Appropriate affect .  SKIN: No rash      Vitals:  T(F): 97.3 (31 Jan 2023 09:00), Max: 98.4 (31 Jan 2023 01:30)  HR: 110 (31 Jan 2023 09:00)  BP: 101/57 (31 Jan 2023 09:00)  RR: 19 (31 Jan 2023 09:00)  SpO2: 99% (31 Jan 2023 09:00) (83% - 100%)  temp max in last 48H T(F): , Max: 98.4 (01-29-23 @ 15:00)      Current Antibiotics:  piperacillin/tazobactam IVPB.. 3.375 Gram(s) IV Intermittent every 12 hours  vancomycin  IVPB 750 milliGRAM(s) IV Intermittent <User Schedule>    Other medications:  albuterol/ipratropium for Nebulization 3 milliLiter(s) Nebulizer every 6 hours  chlorhexidine 2% Cloths 1 Application(s) Topical <User Schedule>  dextrose 50% Injectable 25 Gram(s) IV Push once  dextrose 50% Injectable 12.5 Gram(s) IV Push once  dextrose 50% Injectable 25 Gram(s) IV Push once  glucagon  Injectable 1 milliGRAM(s) IntraMuscular once  influenza  Vaccine (HIGH DOSE) 0.7 milliLiter(s) IntraMuscular once  insulin lispro (ADMELOG) corrective regimen sliding scale   SubCutaneous every 6 hours  levothyroxine Injectable 100 MICROGram(s) IV Push at bedtime  pantoprazole Infusion 8 mG/Hr IV Continuous <Continuous>                            9.5    13.66 )-----------( 177      ( 31 Jan 2023 04:00 )             28.6     01-31    142  |  107  |  76.7<H>  ----------------------------<  140<H>  4.4   |  21.0<L>  |  2.33<H>    Ca    8.7      31 Jan 2023 04:00  Phos  4.1     01-31  Mg     2.0     01-31      RECENT CULTURES:  01-28 @ 11:15 .Blood Blood-Peripheral Blood Culture PCR  Klebsiella pneumoniae  Enterococcus faecalis    Growth in aerobic and anaerobic bottles: Klebsiella pneumoniae  Growth in aerobic and anaerobic bottles: Enterococcus faecalis  Growth in aerobic bottle: Coag Negative Staphylococcus Unable to Identify  further  Coag Negative Staphylococcus  Singleset isolate, possible contaminant. Contact  Microbiology if susceptibility testing clinically indicated.  ***Blood Panel PCR results on this specimen are available  approximately 3 hours after the Gram stain result.***  Gram stain, PCR, and/or culture results may not always  correspond due to difference in methodologies.  ************************************************************  This PCR assay was performed by multiplex PCR. This  Assay tests for 66 bacterial and resistance gene targets.  Please refer to the [facility] test directory  at [email or web address] for details.  Growth in aerobic bottle: Gram Negative Rods and Gram positive cocci in  pairs  Growth in anaerobic bottle: Gram Negative Rods and Gram positive cocci in  pairs    01-28 @ 10:08 .Blood Blood-Peripheral     No growth to date.      WBC Count: 13.66 K/uL (01-31-23 @ 04:00)  WBC Count: 13.23 K/uL (01-30-23 @ 21:29)  WBC Count: 9.25 K/uL (01-30-23 @ 16:56)  WBC Count: 10.38 K/uL (01-30-23 @ 03:40)  WBC Count: 10.78 K/uL (01-29-23 @ 21:30)  WBC Count: 10.19 K/uL (01-29-23 @ 18:11)  WBC Count: 15.40 K/uL (01-29-23 @ 10:16)  WBC Count: 10.92 K/uL (01-29-23 @ 04:11)  WBC Count: 11.16 K/uL (01-28-23 @ 19:57)  WBC Count: 8.79 K/uL (01-28-23 @ 10:08)    Creatinine, Serum: 2.33 mg/dL (01-31-23 @ 04:00)  Creatinine, Serum: 2.46 mg/dL (01-30-23 @ 03:40)  Creatinine, Serum: 2.47 mg/dL (01-29-23 @ 21:30)  Creatinine, Serum: 2.48 mg/dL (01-29-23 @ 17:00)  Creatinine, Serum: 2.35 mg/dL (01-29-23 @ 10:16)  Creatinine, Serum: 2.37 mg/dL (01-29-23 @ 04:11)  Creatinine, Serum: 2.25 mg/dL (01-28-23 @ 17:20)  Creatinine, Serum: 2.38 mg/dL (01-28-23 @ 10:08)    Procalcitonin, Serum: 0.46 ng/mL (01-29-23 @ 04:11)  Procalcitonin, Serum: 0.37 ng/mL (01-28-23 @ 21:28)     SARS-CoV-2: NotDetec (01-28-23 @ 10:08)

## 2023-01-31 NOTE — PHYSICAL THERAPY INITIAL EVALUATION ADULT - GENERAL OBSERVATIONS, REHAB EVAL
Pt received supine in bed + telemetry//BP + Right IJ Triple Lumen + Venous Compression Boots On RA, SPO2 92%, + thomas with temp, c/o 0/10 pain, pt agreeable to PT

## 2023-01-31 NOTE — PROGRESS NOTE ADULT - ASSESSMENT
88 year old female with PMH of excessive BMI, DM, HTN, CAD (on ASA/Plavix), CKD 4, hx of aortic valve repair and bloody bowel movements for the "past several weeks" presents with SOB. Admitted for PNA and GI bleed. Found to have duodenal ulcer on EGD, s/p coil embolization on 01/30/23, remains on PPI gtt. Hospital course is notable for hyperK (resolved) and CKD.     -Creatinine ranged ~1.9-2.2mg/dL in June-July 2022, then 2.6mg/dL in Sept 2022 (as per [facility])  -While here, creatinine ranged ~2.2-2.4mg/dL (which is on par with her baseline renal function); non oliguric   -UA 15 protein, small blood, 3-5 RBC, 26-50 WBC, occasional bacteria  -Doubtful of underlying GN as patient remains at her baseline renal function   -Recommend renal ultrasound for completeness   -Hemodynamically stable (off pressors)    -Metabolic acidosis in setting of CKD - serum bicarb is WNL  -Anemia in setting of GI bleed / CKD - s/p coil embolization on 01/30; % saturation 15, ferritin 461 - would benefit with Venofer IVPB  -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 Admitted for PNA and GI bleed. Found to have duodenal ulcer on EGD, s/p coil embolization on 01/30/23, remains on PPI gtt. Hospital course is notable for hyperK (resolved) and CKD.     -Creatinine ranged ~1.9-2.2mg/dL in June-July 2022, then 2.6mg/dL in Sept 2022 (as per [facility])  -While here, creatinine ranged ~2.2-2.4mg/dL (which is on par with her baseline renal function); non oliguric   -UA 15 protein, small blood, 3-5 RBC, 26-50 WBC, occasional bacteria  -Doubtful of underlying GN as patient remains at her baseline renal function   -Recommend renal ultrasound for completeness   -Hemodynamically stable (off pressors)    -Metabolic acidosis in setting of CKD - serum bicarb is WNL  -Anemia in setting of GI bleed / CKD - s/p coil embolization on 01/30; % saturation 15, ferritin 461 - would benefit with Venofer IVPB (ordered Venofer 100mg IVPB daily x 5 days)  -Mineral Bone 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

## 2023-01-31 NOTE — SWALLOW BEDSIDE ASSESSMENT ADULT - ORAL PREPARATORY PHASE
Within functional limits increased mastication time required, remained functional/Within functional limits

## 2023-01-31 NOTE — PROGRESS NOTE ADULT - ASSESSMENT
87 y/o F with a h/o TAVR, CAD, on ASA/ Plavix, HTN, DM, CKD admitted with Acute blood loss anemia, septic shock, bacteremia.

## 2023-01-31 NOTE — SWALLOW BEDSIDE ASSESSMENT ADULT - SLP PERTINENT HISTORY OF CURRENT PROBLEM
As per H&P: "BRIEF HOSPITAL COURSE: 87 y/o F hx of aortic valve repair, CAD on ASA/plavix, HTN, DM presented with shortness of breath for the past 1 day. endorsing bloody bowel movements for the "past several weeks." Denies prior episodes of GI bleeding. was recently treated with z-pack for bronchitis per patient. denies prior colonoscopy/endoscopy in the past. Denies NSAID use. "

## 2023-01-31 NOTE — PROGRESS NOTE ADULT - SUBJECTIVE AND OBJECTIVE BOX
Had coil embolization yesterday. Hemoglobin is stable. Complained of feeling hungry. (Patient was waiting for swallow evaluation at time of my assessment this a.m.)     Vital Signs Last 24 Hrs  T(C): 36.4 (31 Jan 2023 12:00), Max: 36.9 (31 Jan 2023 01:30)  T(F): 97.5 (31 Jan 2023 12:00), Max: 98.4 (31 Jan 2023 01:30)  HR: 97 (31 Jan 2023 12:00) (67 - 128)  BP: 99/85 (31 Jan 2023 12:00) (88/66 - 144/121)  BP(mean): 92 (31 Jan 2023 12:00) (53 - 130)  RR: 22 (31 Jan 2023 12:00) (12 - 27)  SpO2: 100% (31 Jan 2023 12:00) (83% - 100%)    Parameters below as of 31 Jan 2023 08:00  Patient On (Oxygen Delivery Method): nasal cannula  O2 Flow (L/min): 2    I&O's Summary    30 Jan 2023 07:01  -  31 Jan 2023 07:00  --------------------------------------------------------  IN: 1446.1 mL / OUT: 1540 mL / NET: -93.9 mL    Physical Exam  General: Pale appearing obese female in NAD  Cardiac: S1S2 RRR  Respiratory: CTAB  Abdomen: Soft, NT  Extremities: Anasarca   Neuro: AAOx3    01-31    142  |  107  |  76.7<H>  ----------------------------<  140<H>  4.4   |  21.0<L>  |  2.33<H>    Ca    8.7      31 Jan 2023 04:00  Phos  4.1     01-31  Mg     2.0     01-31                        9.5    13.66 )-----------( 177      ( 31 Jan 2023 04:00 )             28.6     MEDICATIONS  (STANDING):  albuterol/ipratropium for Nebulization 3 milliLiter(s) Nebulizer every 6 hours  chlorhexidine 2% Cloths 1 Application(s) Topical <User Schedule>  dextrose 50% Injectable 25 Gram(s) IV Push once  dextrose 50% Injectable 12.5 Gram(s) IV Push once  dextrose 50% Injectable 25 Gram(s) IV Push once  glucagon  Injectable 1 milliGRAM(s) IntraMuscular once  influenza  Vaccine (HIGH DOSE) 0.7 milliLiter(s) IntraMuscular once  insulin lispro (ADMELOG) corrective regimen sliding scale   SubCutaneous every 6 hours  levothyroxine Injectable 100 MICROGram(s) IV Push at bedtime  pantoprazole Infusion 8 mG/Hr (10 mL/Hr) IV Continuous <Continuous>  piperacillin/tazobactam IVPB.. 3.375 Gram(s) IV Intermittent every 12 hours  vancomycin  IVPB 750 milliGRAM(s) IV Intermittent <User Schedule>    MEDICATIONS  (PRN):  melatonin 5 milliGRAM(s) Oral at bedtime PRN Sleep  sodium chloride 0.9% lock flush 10 milliLiter(s) IV Push every 1 hour PRN Pre/post blood products, medications, blood draw, and to maintain line patency

## 2023-01-31 NOTE — PHYSICAL THERAPY INITIAL EVALUATION ADULT - IMPAIRED TRANSFERS: BED/CHAIR, REHAB EVAL
SPO2 decreased to 85% on RA, pt placed on 2L O2nc and with verbal cues, SPO2 increased to 94% once seated in chair./impaired balance/impaired postural control/decreased strength

## 2023-01-31 NOTE — PROGRESS NOTE ADULT - SUBJECTIVE AND OBJECTIVE BOX
Chief Complaint:  Patient is a 88y old  Female who presents with a chief complaint of GIB (30 Jan 2023 14:56)      HPI/ 24 hr events: Patient seen and examined at bedside. She is s/p IR coil embolization of the R gastroepiploic, SPDA and GDA proper. RN reports 3 dark brown BMs overnight, no hematochezia or melena. Hgb is 9.5 this morning. Received 2 u pRBC yesterday morning.       REVIEW OF SYSTEMS:   General: Negative  HEENT: Negative  CV: Negative  Respiratory: Negative  GI: See HPI  : Negative  MSK: Negative  Hematologic: Negative  Skin: Negative    MEDICATIONS:   MEDICATIONS  (STANDING):  albuterol/ipratropium for Nebulization 3 milliLiter(s) Nebulizer every 6 hours  chlorhexidine 2% Cloths 1 Application(s) Topical <User Schedule>  dextrose 50% Injectable 25 Gram(s) IV Push once  dextrose 50% Injectable 12.5 Gram(s) IV Push once  dextrose 50% Injectable 25 Gram(s) IV Push once  glucagon  Injectable 1 milliGRAM(s) IntraMuscular once  influenza  Vaccine (HIGH DOSE) 0.7 milliLiter(s) IntraMuscular once  insulin lispro (ADMELOG) corrective regimen sliding scale   SubCutaneous every 6 hours  levothyroxine Injectable 100 MICROGram(s) IV Push at bedtime  pantoprazole Infusion 8 mG/Hr (10 mL/Hr) IV Continuous <Continuous>  piperacillin/tazobactam IVPB.. 3.375 Gram(s) IV Intermittent every 12 hours  vancomycin  IVPB 750 milliGRAM(s) IV Intermittent <User Schedule>    MEDICATIONS  (PRN):  melatonin 5 milliGRAM(s) Oral at bedtime PRN Sleep  sodium chloride 0.9% lock flush 10 milliLiter(s) IV Push every 1 hour PRN Pre/post blood products, medications, blood draw, and to maintain line patency      ALLERGIES:   Allergies    No Known Allergies    Intolerances        VITAL SIGNS:   Vital Signs Last 24 Hrs  T(C): 36.3 (31 Jan 2023 09:00), Max: 36.9 (31 Jan 2023 01:30)  T(F): 97.3 (31 Jan 2023 09:00), Max: 98.4 (31 Jan 2023 01:30)  HR: 110 (31 Jan 2023 09:00) (67 - 128)  BP: 101/57 (31 Jan 2023 09:00) (90/51 - 144/121)  BP(mean): 68 (31 Jan 2023 09:00) (53 - 130)  RR: 19 (31 Jan 2023 09:00) (12 - 27)  SpO2: 99% (31 Jan 2023 09:00) (83% - 100%)    Parameters below as of 31 Jan 2023 08:00  Patient On (Oxygen Delivery Method): nasal cannula  O2 Flow (L/min): 2    I&O's Summary    30 Jan 2023 07:01  -  31 Jan 2023 07:00  --------------------------------------------------------  IN: 1446.1 mL / OUT: 1540 mL / NET: -93.9 mL        PHYSICAL EXAM:   GENERAL:  No acute distress  HEENT:  NC/AT, conjunctiva clear, sclera anicteric, on nasal cannula   CHEST:  No increased effort  HEART:  Tachycardic   ABDOMEN:  Soft, non-tender, non-distended, normoactive bowel sounds, no rebound or guarding  EXTREMITIES: + edema  SKIN:  Warm, dry  NEURO:  Calm, cooperative    LABS:                        9.5    13.66 )-----------( 177      ( 31 Jan 2023 04:00 )             28.6     01-31    142  |  107  |  76.7<H>  ----------------------------<  140<H>  4.4   |  21.0<L>  |  2.33<H>    Ca    8.7      31 Jan 2023 04:00  Phos  4.1     01-31  Mg     2.0     01-31              Culture - Blood (collected 28 Jan 2023 11:15)  Source: .Blood Blood-Peripheral  Gram Stain (29 Jan 2023 05:11):    Growth in aerobic bottle: Gram Negative Rods and Gram positive cocci in    pairs    Growth in anaerobic bottle: Gram Negative Rods and Gram positive cocci in    pairs  Final Report (30 Jan 2023 22:02):    Growth in aerobic and anaerobic bottles: Klebsiella pneumoniae    Growth in aerobic and anaerobic bottles: Enterococcus faecalis    Growth in aerobic bottle: Coag Negative Staphylococcus Unable to Identify    further    Coag Negative Staphylococcus    Singleset isolate, possible contaminant. Contact    Microbiology if susceptibility testing clinically    indicated.    ***Blood Panel PCR results on this specimen are available    approximately 3 hours after the Gram stain result.***    Gram stain, PCR, and/or culture results may not always    correspond due to difference in methodologies.    ************************************************************    This PCR assay was performed by multiplex PCR. This    Assay tests for 66 bacterial and resistance gene targets.    Please refer to the [facility] Labs test directory    at [email or web address] for details.  Organism: Blood Culture PCR  Klebsiella pneumoniae  Enterococcus faecalis (30 Jan 2023 22:02)  Organism: Enterococcus faecalis (30 Jan 2023 22:02)  Organism: Klebsiella pneumoniae (30 Jan 2023 22:02)  Organism: Blood Culture PCR (30 Jan 2023 22:02)    Culture - Blood (collected 28 Jan 2023 10:08)  Source: .Blood Blood-Peripheral  Preliminary Report (29 Jan 2023 17:01):    No growth to date.                                    RADIOLOGY & ADDITIONAL STUDIES:          EGD Report    Date: 1/29/2023 7:26 AM        Findings:    Esophagus Mucosa Normal mucosa was noted in the whole esophagus.    Stomach Mucosa Normal mucosa was noted in the stomach.    Duodenum Excavated lesions A cratered non-bleeding 8 mm ulcer was found in the    second part of the duodenum.    A single cratered 10 mm ulcer was found in the second part of the duodenal. A    visible vessel suggested recent bleeding. Bi-cap electrocautery was successfully    applied. Two endoclip was successfully applied for the purpose of hemostasis.    Mucosa Normal mucosa was noted in the First part of the duodenum.    Other Interventions:        Impressions:    Normal mucosa in the gastroesophageal junction and whole esophagus.    Normal mucosa in the stomach.    Normal mucosa in the First part of the duodenum.    Ulcer in the second part of the duodenum.    Ulcer in the second part of the duodenal. (Thermal Therapy, Endoclip).        Plan:    Admit to Hospital    Continue current medical regimen.    IV PPI drip for additional 48 hours followed by PO PPI BID for 12 weeks.    Monitor Hb and keep the target hb at 8mg/dl    Stool for H Pylori antigen    Avoid NSAIDs and antiplatelet therapy at this time    Clearliquid diet today    If she has recurrent bleeding suggest to consult IR for embolization.        Specimens:        Additional Notes:        Pathology:        Attending Participation:        Jacob Coyle MD        Version 1, Electronically signed on 1/29/2023 9:11:29 AM by Jacob Coyle MD       Chief Complaint:  Patient is a 88y old  Female who presents with a chief complaint of GIB (30 Jan 2023 14:56)      HPI/ 24 hr events: Patient seen and examined at bedside. She is s/p IR coil embolization of the R gastroepiploic, SPDA and GDA proper. RN reports 3 dark brown BMs overnight, no hematochezia or melena. Hgb is 9.5 this morning. Received 2 u pRBC yesterday morning.       REVIEW OF SYSTEMS:   General: Negative  HEENT: Negative  CV: Negative  Respiratory: Negative  GI: See HPI  : Negative  MSK: Negative  Hematologic: Negative  Skin: Negative    MEDICATIONS:   MEDICATIONS  (STANDING):  albuterol/ipratropium for Nebulization 3 milliLiter(s) Nebulizer every 6 hours  chlorhexidine 2% Cloths 1 Application(s) Topical <User Schedule>  dextrose 50% Injectable 25 Gram(s) IV Push once  dextrose 50% Injectable 12.5 Gram(s) IV Push once  dextrose 50% Injectable 25 Gram(s) IV Push once  glucagon  Injectable 1 milliGRAM(s) IntraMuscular once  influenza  Vaccine (HIGH DOSE) 0.7 milliLiter(s) IntraMuscular once  insulin lispro (ADMELOG) corrective regimen sliding scale   SubCutaneous every 6 hours  levothyroxine Injectable 100 MICROGram(s) IV Push at bedtime  pantoprazole Infusion 8 mG/Hr (10 mL/Hr) IV Continuous <Continuous>  piperacillin/tazobactam IVPB.. 3.375 Gram(s) IV Intermittent every 12 hours  vancomycin  IVPB 750 milliGRAM(s) IV Intermittent <User Schedule>    MEDICATIONS  (PRN):  melatonin 5 milliGRAM(s) Oral at bedtime PRN Sleep  sodium chloride 0.9% lock flush 10 milliLiter(s) IV Push every 1 hour PRN Pre/post blood products, medications, blood draw, and to maintain line patency      ALLERGIES:   Allergies    No Known Allergies    Intolerances        VITAL SIGNS:   Vital Signs Last 24 Hrs  T(C): 36.3 (31 Jan 2023 09:00), Max: 36.9 (31 Jan 2023 01:30)  T(F): 97.3 (31 Jan 2023 09:00), Max: 98.4 (31 Jan 2023 01:30)  HR: 110 (31 Jan 2023 09:00) (67 - 128)  BP: 101/57 (31 Jan 2023 09:00) (90/51 - 144/121)  BP(mean): 68 (31 Jan 2023 09:00) (53 - 130)  RR: 19 (31 Jan 2023 09:00) (12 - 27)  SpO2: 99% (31 Jan 2023 09:00) (83% - 100%)    Parameters below as of 31 Jan 2023 08:00  Patient On (Oxygen Delivery Method): nasal cannula  O2 Flow (L/min): 2    I&O's Summary    30 Jan 2023 07:01  -  31 Jan 2023 07:00  --------------------------------------------------------  IN: 1446.1 mL / OUT: 1540 mL / NET: -93.9 mL        PHYSICAL EXAM:   GENERAL:  No acute distress  HEENT:  NC/AT, conjunctiva clear, sclera anicteric, on nasal cannula   CHEST:  No increased effort  HEART:  Tachycardic   ABDOMEN:  Soft, non-tender, non-distended, normoactive bowel sounds, no rebound or guarding  EXTREMITIES: + edema  SKIN:  Warm, dry  NEURO:  Calm, cooperative    LABS:                        9.5    13.66 )-----------( 177      ( 31 Jan 2023 04:00 )             28.6     01-31    142  |  107  |  76.7<H>  ----------------------------<  140<H>  4.4   |  21.0<L>  |  2.33<H>    Ca    8.7      31 Jan 2023 04:00  Phos  4.1     01-31  Mg     2.0     01-31              Culture - Blood (collected 28 Jan 2023 11:15)  Source: .Blood Blood-Peripheral  Gram Stain (29 Jan 2023 05:11):    Growth in aerobic bottle: Gram Negative Rods and Gram positive cocci in    pairs    Growth in anaerobic bottle: Gram Negative Rods and Gram positive cocci in    pairs  Final Report (30 Jan 2023 22:02):    Growth in aerobic and anaerobic bottles: Klebsiella pneumoniae    Growth in aerobic and anaerobic bottles: Enterococcus faecalis    Growth in aerobic bottle: Coag Negative Staphylococcus Unable to Identify    further    Coag Negative Staphylococcus    Singleset isolate, possible contaminant. Contact    Microbiology if susceptibility testing clinically    indicated.    ***Blood Panel PCR results on this specimen are available    approximately 3 hours after the Gram stain result.***    Gram stain, PCR, and/or culture results may not always    correspond due to difference in methodologies.    ************************************************************    This PCR assay was performed by multiplex PCR. This    Assay tests for 66 bacterial and resistance gene targets.    Please refer to the [facility] Labs test directory    at [email or web address] for details.  Organism: Blood Culture PCR  Klebsiella pneumoniae  Enterococcus faecalis (30 Jan 2023 22:02)  Organism: Enterococcus faecalis (30 Jan 2023 22:02)  Organism: Klebsiella pneumoniae (30 Jan 2023 22:02)  Organism: Blood Culture PCR (30 Jan 2023 22:02)    Culture - Blood (collected 28 Jan 2023 10:08)  Source: .Blood Blood-Peripheral  Preliminary Report (29 Jan 2023 17:01):    No growth to date.                                    RADIOLOGY & ADDITIONAL STUDIES:          EGD Report    Date: 1/29/2023 7:26 AM        Findings:    Esophagus Mucosa Normal mucosa was noted in the whole esophagus.    Stomach Mucosa Normal mucosa was noted in the stomach.    Duodenum Excavated lesions A cratered non-bleeding 8 mm ulcer was found in the    second part of the duodenum.    A single cratered 10 mm ulcer was found in the second part of the duodenal. A    visible vessel suggested recent bleeding. Bi-cap electrocautery was successfully    applied. Two endoclip was successfully applied for the purpose of hemostasis.    Mucosa Normal mucosa was noted in the First part of the duodenum.    Other Interventions:        Impressions:    Normal mucosa in the gastroesophageal junction and whole esophagus.    Normal mucosa in the stomach.    Normal mucosa in the First part of the duodenum.    Ulcer in the second part of the duodenum.    Ulcer in the second part of the duodenal. (Thermal Therapy, Endoclip).        Plan:    Admit to Hospital    Continue current medical regimen.    IV PPI drip for additional 48 hours followed by PO PPI BID for 12 weeks.    Monitor Hb and keep the target hb at 8mg/dl    Stool for H Pylori antigen    Avoid NSAIDs and antiplatelet therapy at this time    Clearliquid diet today    If she has recurrent bleeding suggest to consult IR for embolization.        Specimens:        Additional Notes:        Pathology:        Attending Participation:        Jacob Coyle MD        Version 1, Electronically signed on 1/29/2023 9:11:29 AM by Jacob Coyle MD

## 2023-01-31 NOTE — SWALLOW BEDSIDE ASSESSMENT ADULT - SLP GENERAL OBSERVATIONS
Pt recd a&ox3 sitting up in chair, cooperative/conversational, baseline cough,  O2 @ 100%, NAD, 0/10 pain scale pre/post.

## 2023-02-01 NOTE — PROGRESS NOTE ADULT - SUBJECTIVE AND OBJECTIVE BOX
Reason for visit: ANA CRISTINA    Subjective: Lethargic    ROS: Unable to obtain secondary to patient current clinical condition.     Physical Exam  General: Pale appearing obese female in NAD  Cardiac: S1S2 RRR  Respiratory: CTAB  Abdomen: Soft, NT  Extremities: Anasarca   Neuro: lethargic    =======================================================  Vital Signs Last 24 Hrs  T(C): 36.4 (01 Feb 2023 11:00), Max: 37.1 (31 Jan 2023 21:00)  T(F): 97.5 (01 Feb 2023 11:00), Max: 98.7 (31 Jan 2023 21:00)  HR: 86 (01 Feb 2023 11:00) (83 - 150)  BP: 117/59 (01 Feb 2023 11:00) (59/44 - 147/134)  BP(mean): 77 (01 Feb 2023 11:00) (51 - 140)  RR: 15 (01 Feb 2023 11:00) (14 - 35)  SpO2: 100% (01 Feb 2023 11:00) (83% - 100%)    Parameters below as of 01 Feb 2023 08:49  Patient On (Oxygen Delivery Method): nasal cannula,4 lpm      I&O's Summary    31 Jan 2023 07:01  -  01 Feb 2023 07:00  --------------------------------------------------------  IN: 3967.4 mL / OUT: 2052 mL / NET: 1915.4 mL    01 Feb 2023 07:01  -  01 Feb 2023 11:04  --------------------------------------------------------  IN: 187.3 mL / OUT: 77 mL / NET: 110.3 mL      =======================================================  Current Antibiotics:  piperacillin/tazobactam IVPB.. 3.375 Gram(s) IV Intermittent every 12 hours    Other medications:  albuterol/ipratropium for Nebulization 3 milliLiter(s) Nebulizer every 6 hours  chlorhexidine 2% Cloths 1 Application(s) Topical <User Schedule>  dextrose 50% Injectable 25 Gram(s) IV Push once  dextrose 50% Injectable 12.5 Gram(s) IV Push once  dextrose 50% Injectable 25 Gram(s) IV Push once  furosemide   Injectable 80 milliGRAM(s) IV Push two times a day  glucagon  Injectable 1 milliGRAM(s) IntraMuscular once  influenza  Vaccine (HIGH DOSE) 0.7 milliLiter(s) IntraMuscular once  insulin glargine Injectable (LANTUS) 20 Unit(s) SubCutaneous at bedtime  insulin lispro (ADMELOG) corrective regimen sliding scale   SubCutaneous every 6 hours  insulin lispro Injectable (ADMELOG) 2 Unit(s) SubCutaneous three times a day before meals  iron sucrose IVPB 100 milliGRAM(s) IV Intermittent every 24 hours  levothyroxine 125 MICROGram(s) Oral at bedtime  norepinephrine Infusion 0.05 MICROgram(s)/kG/Min IV Continuous <Continuous>  pantoprazole Infusion 8 mG/Hr IV Continuous <Continuous>  pantoprazole Infusion 8 mG/Hr IV Continuous <Continuous>  phenylephrine    Infusion 0.5 MICROgram(s)/kG/Min IV Continuous <Continuous>  potassium chloride  10 mEq/50 mL IVPB 10 milliEquivalent(s) IV Intermittent once  sertraline 50 milliGRAM(s) Oral daily  traZODone 50 milliGRAM(s) Oral at bedtime    =======================================================  02-01    140  |  106  |  63.2<H>  ----------------------------<  145<H>  3.8   |  21.0<L>  |  2.53<H>    Ca    8.4      01 Feb 2023 05:00  Phos  4.4     02-01  Mg     2.4     02-01    TPro  4.9<L>  /  Alb  2.8<L>  /  TBili  0.4  /  DBili  x   /  AST  52<H>  /  ALT  24  /  AlkPhos  60  02-01    Creatinine, Serum: 2.53 mg/dL (02-01-23 @ 05:00)  Creatinine, Serum: 2.52 mg/dL (02-01-23 @ 00:04)  Creatinine, Serum: 2.33 mg/dL (01-31-23 @ 04:00)  Creatinine, Serum: 2.46 mg/dL (01-30-23 @ 03:40)  Creatinine, Serum: 2.47 mg/dL (01-29-23 @ 21:30)  Creatinine, Serum: 2.48 mg/dL (01-29-23 @ 17:00)  Creatinine, Serum: 2.35 mg/dL (01-29-23 @ 10:16)  Creatinine, Serum: 2.37 mg/dL (01-29-23 @ 04:11)  Creatinine, Serum: 2.25 mg/dL (01-28-23 @ 17:20)  Creatinine, Serum: 2.38 mg/dL (01-28-23 @ 10:08)      =======================================================

## 2023-02-01 NOTE — PROGRESS NOTE ADULT - ASSESSMENT
89YO F w/ DM, HTN, CAD, CKD 4, TAVR and bloody bowel movements for the "past several weeks" presents with SOB. Admitted for PNA and GI bleed. Found to have duodenal ulcer on EGD, s/p coil embolization on 01/30/23, remains on PPI gtt. Hospital course is notable for hyperK (resolved) and CKD.     -Creatinine ranged ~1.9-2.2mg/dL in June-July 2022, then 2.6mg/dL in Sept 2022 (as per [facility])  -While here, creatinine ranged ~2.2-2.5mg/dL (which is on par with her baseline renal function); non oliguric   -UA 15 protein, small blood, 3-5 RBC, 26-50 WBC, occasional bacteria  -Hemodynamically stable (off pressors)    -Metabolic acidosis in setting of CKD - serum bicarb is WNL  -Anemia in setting of GI bleed / CKD - s/p coil embolization on 01/30; % saturation 15, ferritin 461 - would benefit with Venofer IVPB (ordered Venofer 100mg IVPB daily x 5 days)  -Mineral Bone Disease in setting of CKD - PTH and 25 Vitamin D within acceptable limits    Stable from nephrology standpoint  Nephrology will sign off, thanks for the consult.

## 2023-02-01 NOTE — CONSULT NOTE ADULT - SUBJECTIVE AND OBJECTIVE BOX
HPI:  Patient is a 88y old  Female who presents with a chief complaint of shortness of breath     BRIEF HOSPITAL COURSE: 87 y/o F hx of aortic valve repair, CAD on ASA/plavix, HTN, DM presented with shortness of breath for the past 1 day. endorsing bloody bowel movements for the "past several weeks." Denies prior episodes of GI bleeding. was recently treated with z-pack for bronchitis per patient. denies prior colonoscopy/endoscopy in the past. Denies NSAID use.   In the ER, patient hypotensive with SBP in the 70s on arrival. Placed on 4L nasal cannula for shortness of breath and hypoxia by ER team. 1 unit of PRBC started aprox 1 hour ago, 2 units prbc in total. chest xray shows LLL PNA - ceftriaxone and vancomycin given. Potassium 6.8 w/ no acute ischemic changes on EKG. 10U IVP insulin, amp of d50, 2g calcium gluconate and albuterol given for hyperK treatment in the ER. guaiac positive, GI consulted and plan is for EGD tomorrow morning.     88F with past medical history as listed admitted 1/28 with     PERTINENT PMH REVIEWED: Yes     PAST MEDICAL & SURGICAL HISTORY:  Hypertension  History of Hyperthyroidism  Hyperlipemia  Diabetes mellitus type II  S/P TKR (total knee replacement) left knee  Carpal tunnel syndrome on both sides  History of cataract extraction bilateral  H/O total hysterectomy  History of laminectomy    SOCIAL HISTORY:                      Substance history:                     Admitted from:  home                     Scientology/spirituality:                    Cultural concerns:                      Surrogate     FAMILY HISTORY:     Allergies    No Known Allergies    ADVANCE DIRECTIVES/TREATMENT PREFERENCES:  Full code, all aggressive measures desired   DNR/DNI - MOLST, continue all other medical treatments  DNR/DNI - MOLST, comfort measures only     Baseline ADLs (prior to admission):  Independent/ Dependent      Karnofsky/Palliative Performance Status Version 2:  %  http://npcrc.org/files/news/palliative_performance_scale_[email or web address].pdf    Present Symptoms:     Dyspnea: Mild Moderate Severe  Nausea/Vomiting: Yes No  Anxiety:  Yes No  Depression: Yes No  Fatigue: Yes No  Loss of appetite: Yes No  Constipation:     Pain:             Character-            Duration-            Effect-            Factors-            Frequency-            Location-            Severity-    Pain AD Score:  [email or web address] (press ctrl + left click to view)    Review of Systems: Reviewed                     Negative:                     Positive:  Unable to obtain due to poor mentation   All others negative    MEDICATIONS  (STANDING):  albuterol/ipratropium for Nebulization 3 milliLiter(s) Nebulizer every 6 hours  chlorhexidine 2% Cloths 1 Application(s) Topical <User Schedule>  dextrose 50% Injectable 25 Gram(s) IV Push once  dextrose 50% Injectable 12.5 Gram(s) IV Push once  dextrose 50% Injectable 25 Gram(s) IV Push once  furosemide   Injectable 80 milliGRAM(s) IV Push two times a day  glucagon  Injectable 1 milliGRAM(s) IntraMuscular once  influenza  Vaccine (HIGH DOSE) 0.7 milliLiter(s) IntraMuscular once  insulin glargine Injectable (LANTUS) 20 Unit(s) SubCutaneous at bedtime  insulin lispro (ADMELOG) corrective regimen sliding scale   SubCutaneous every 6 hours  insulin lispro Injectable (ADMELOG) 2 Unit(s) SubCutaneous three times a day before meals  iron sucrose IVPB 100 milliGRAM(s) IV Intermittent every 24 hours  levothyroxine 125 MICROGram(s) Oral at bedtime  norepinephrine Infusion 0.05 MICROgram(s)/kG/Min (8.93 mL/Hr) IV Continuous <Continuous>  pantoprazole Infusion 8 mG/Hr (10 mL/Hr) IV Continuous <Continuous>  pantoprazole Infusion 8 mG/Hr (10 mL/Hr) IV Continuous <Continuous>  phenylephrine    Infusion 0.5 MICROgram(s)/kG/Min (17.9 mL/Hr) IV Continuous <Continuous>  piperacillin/tazobactam IVPB.. 3.375 Gram(s) IV Intermittent every 12 hours  potassium chloride  10 mEq/50 mL IVPB 10 milliEquivalent(s) IV Intermittent once  sertraline 50 milliGRAM(s) Oral daily  traZODone 50 milliGRAM(s) Oral at bedtime    MEDICATIONS  (PRN):  melatonin 5 milliGRAM(s) Oral at bedtime PRN Sleep  ondansetron Injectable 4 milliGRAM(s) IV Push every 6 hours PRN Nausea and/or Vomiting  sodium chloride 0.9% lock flush 10 milliLiter(s) IV Push every 1 hour PRN Pre/post blood products, medications, blood draw, and to maintain line patency      PHYSICAL EXAM:    Vital Signs Last 24 Hrs  T(C): 36.6 (01 Feb 2023 09:00), Max: 37.1 (31 Jan 2023 21:00)  T(F): 97.9 (01 Feb 2023 09:00), Max: 98.7 (31 Jan 2023 21:00)  HR: 89 (01 Feb 2023 09:00) (83 - 150)  BP: 100/50 (01 Feb 2023 09:00) (59/44 - 147/134)  BP(mean): 66 (01 Feb 2023 09:00) (51 - 140)  RR: 23 (01 Feb 2023 09:00) (15 - 35)  SpO2: 100% (01 Feb 2023 09:00) (83% - 100%)    Parameters below as of 01 Feb 2023 08:49  Patient On (Oxygen Delivery Method): nasal cannula,4 lpm    General: alert  oriented x ____ lethargic agitated delirious                  cachexia  nonverbal  coma    HEENT: normal  dry mouth  ET tube/trach    Lungs: comfortable tachypnea/labored breathing  excessive secretions    CV: normal  tachycardia    GI: normal  distended  tender  no BS               PEG/NG/OG tube  constipation  last BM:     : normal  incontinent  oliguria/anuria  thomas    MSK: normal  weakness  edema             ambulatory  bedbound/wheelchair bound    Neuro: no focal deficits cognitive impairment dysphagia dysarthria hemiplegia     Skin: normal  pressure ulcers- Stage_____  no rash    LABS:                      10.1   12.35 )-----------( 195      ( 01 Feb 2023 05:00 )             30.5     02-01    140  |  106  |  63.2<H>  ----------------------------<  145<H>  3.8   |  21.0<L>  |  2.53<H>    Ca    8.4      01 Feb 2023 05:00  Phos  4.4     02-01  Mg     2.4     02-01    TPro  4.9<L>  /  Alb  2.8<L>  /  TBili  0.4  /  DBili  x   /  AST  52<H>  /  ALT  24  /  AlkPhos  60  02-01        I&O's Summary    31 Jan 2023 07:01  -  01 Feb 2023 07:00  --------------------------------------------------------  IN: 3967.4 mL / OUT: 2052 mL / NET: 1915.4 mL    01 Feb 2023 07:01  -  01 Feb 2023 10:05  --------------------------------------------------------  IN: 130.8 mL / OUT: 72 mL / NET: 58.8 mL        RADIOLOGY & ADDITIONAL STUDIES:       HPI:  Patient is a 88y old  Female who presents with a chief complaint of shortness of breath     BRIEF HOSPITAL COURSE: 89 y/o F hx of aortic valve repair, CAD on ASA/plavix, HTN, DM presented with shortness of breath for the past 1 day. endorsing bloody bowel movements for the "past several weeks." Denies prior episodes of GI bleeding. was recently treated with z-pack for bronchitis per patient. denies prior colonoscopy/endoscopy in the past. Denies NSAID use.   In the ER, patient hypotensive with SBP in the 70s on arrival. Placed on 4L nasal cannula for shortness of breath and hypoxia by ER team. 1 unit of PRBC started aprox 1 hour ago, 2 units prbc in total. chest xray shows LLL PNA - ceftriaxone and vancomycin given. Potassium 6.8 w/ no acute ischemic changes on EKG. 10U IVP insulin, amp of d50, 2g calcium gluconate and albuterol given for hyperK treatment in the ER. guaiac positive, GI consulted and plan is for EGD tomorrow morning.     88F with past medical history as listed admitted 1/28 with shortness of breath, gastrointestinal bleed, s/p EGD     PERTINENT PMH REVIEWED: Yes     PAST MEDICAL & SURGICAL HISTORY:  Hypertension  History of Hyperthyroidism  Hyperlipemia  Diabetes mellitus type II  S/P TKR (total knee replacement) left knee  Carpal tunnel syndrome on both sides  History of cataract extraction bilateral  H/O total hysterectomy  History of laminectomy    SOCIAL HISTORY:                      Substance history:                     Admitted from:  home                     Restorationism/spirituality:                    Cultural concerns:                      Surrogate     FAMILY HISTORY:     Allergies    No Known Allergies    ADVANCE DIRECTIVES/TREATMENT PREFERENCES:  Full code, all aggressive measures desired   DNR/DNI - MOLST, continue all other medical treatments  DNR/DNI - MOLST, comfort measures only     Baseline ADLs (prior to admission):  Independent/ Dependent      Karnofsky/Palliative Performance Status Version 2:  %  http://npcrc.org/files/news/palliative_performance_scale_[email or web address].pdf    Present Symptoms:     Dyspnea: Mild Moderate Severe  Nausea/Vomiting: Yes No  Anxiety:  Yes No  Depression: Yes No  Fatigue: Yes No  Loss of appetite: Yes No  Constipation:     Pain:             Character-            Duration-            Effect-            Factors-            Frequency-            Location-            Severity-    Pain AD Score:  [email or web address] (press ctrl + left click to view)    Review of Systems: Reviewed                     Negative:                     Positive:  Unable to obtain due to poor mentation   All others negative    MEDICATIONS  (STANDING):  albuterol/ipratropium for Nebulization 3 milliLiter(s) Nebulizer every 6 hours  chlorhexidine 2% Cloths 1 Application(s) Topical <User Schedule>  dextrose 50% Injectable 25 Gram(s) IV Push once  dextrose 50% Injectable 12.5 Gram(s) IV Push once  dextrose 50% Injectable 25 Gram(s) IV Push once  furosemide   Injectable 80 milliGRAM(s) IV Push two times a day  glucagon  Injectable 1 milliGRAM(s) IntraMuscular once  influenza  Vaccine (HIGH DOSE) 0.7 milliLiter(s) IntraMuscular once  insulin glargine Injectable (LANTUS) 20 Unit(s) SubCutaneous at bedtime  insulin lispro (ADMELOG) corrective regimen sliding scale   SubCutaneous every 6 hours  insulin lispro Injectable (ADMELOG) 2 Unit(s) SubCutaneous three times a day before meals  iron sucrose IVPB 100 milliGRAM(s) IV Intermittent every 24 hours  levothyroxine 125 MICROGram(s) Oral at bedtime  norepinephrine Infusion 0.05 MICROgram(s)/kG/Min (8.93 mL/Hr) IV Continuous <Continuous>  pantoprazole Infusion 8 mG/Hr (10 mL/Hr) IV Continuous <Continuous>  pantoprazole Infusion 8 mG/Hr (10 mL/Hr) IV Continuous <Continuous>  phenylephrine    Infusion 0.5 MICROgram(s)/kG/Min (17.9 mL/Hr) IV Continuous <Continuous>  piperacillin/tazobactam IVPB.. 3.375 Gram(s) IV Intermittent every 12 hours  potassium chloride  10 mEq/50 mL IVPB 10 milliEquivalent(s) IV Intermittent once  sertraline 50 milliGRAM(s) Oral daily  traZODone 50 milliGRAM(s) Oral at bedtime    MEDICATIONS  (PRN):  melatonin 5 milliGRAM(s) Oral at bedtime PRN Sleep  ondansetron Injectable 4 milliGRAM(s) IV Push every 6 hours PRN Nausea and/or Vomiting  sodium chloride 0.9% lock flush 10 milliLiter(s) IV Push every 1 hour PRN Pre/post blood products, medications, blood draw, and to maintain line patency      PHYSICAL EXAM:    Vital Signs Last 24 Hrs  T(C): 36.6 (01 Feb 2023 09:00), Max: 37.1 (31 Jan 2023 21:00)  T(F): 97.9 (01 Feb 2023 09:00), Max: 98.7 (31 Jan 2023 21:00)  HR: 89 (01 Feb 2023 09:00) (83 - 150)  BP: 100/50 (01 Feb 2023 09:00) (59/44 - 147/134)  BP(mean): 66 (01 Feb 2023 09:00) (51 - 140)  RR: 23 (01 Feb 2023 09:00) (15 - 35)  SpO2: 100% (01 Feb 2023 09:00) (83% - 100%)    Parameters below as of 01 Feb 2023 08:49  Patient On (Oxygen Delivery Method): nasal cannula,4 lpm    General: lethargic     HEENT: normal     Lungs: comfortable     CV: normal      GI: normal      : thomas    MSK: weakness     Neuro: no focal deficits     Skin: no rash    LABS:                      10.1   12.35 )-----------( 195      ( 01 Feb 2023 05:00 )             30.5     02-01    140  |  106  |  63.2<H>  ----------------------------<  145<H>  3.8   |  21.0<L>  |  2.53<H>    Ca    8.4      01 Feb 2023 05:00  Phos  4.4     02-01  Mg     2.4     02-01    TPro  4.9<L>  /  Alb  2.8<L>  /  TBili  0.4  /  DBili  x   /  AST  52<H>  /  ALT  24  /  AlkPhos  60  02-01    I&O's Summary    31 Jan 2023 07:01  -  01 Feb 2023 07:00  --------------------------------------------------------  IN: 3967.4 mL / OUT: 2052 mL / NET: 1915.4 mL    01 Feb 2023 07:01  -  01 Feb 2023 10:05  --------------------------------------------------------  IN: 130.8 mL / OUT: 72 mL / NET: 58.8 mL    RADIOLOGY & ADDITIONAL STUDIES:    < from: Xray Chest 1 View- PORTABLE-Urgent (Xray Chest 1 View- PORTABLE-Urgent .) (01.31.23 @ 09:22) >  INTERPRETATION:  AP chest on January 31, 2023 at 9:11 AM. Patient has a   GI bleed and is short of breath.    Heart magnified by technique. However aortic valve again noted.    There is an increasing left base effusion now mild and is significant   increase right base infiltrate with pleural component.    Incidental azygous lobe.    IMPRESSION: Increasing bilateral lung and pleural findings.    --- End of Report ---    MARTINE BARRERA MD; Attending Radiologist  This document has been electronically signed. Jan 31 2023 12:15PM    < end of copied text >    < from: Xray Hip 2-3 Views, Left (01.28.23 @ 19:00) >    IMPRESSION:  1.  No fracture.  2.  Status post total left knee replacement. No evidence of loosening or   disruption of hardware.    --- End of Report ---    < end of copied text >    < from: Xray Hip 2-3 Views, Left (01.28.23 @ 19:00) >    IMPRESSION:  1.  No fracture.  2.  Status post total left knee replacement. No evidence of loosening or   disruption of hardware.    --- End of Report ---    < end of copied text >     HPI:  Patient is a 88y old  Female who presents with a chief complaint of shortness of breath     BRIEF HOSPITAL COURSE: 89 y/o F hx of aortic valve repair, CAD on ASA/plavix, HTN, DM presented with shortness of breath for the past 1 day. endorsing bloody bowel movements for the "past several weeks." Denies prior episodes of GI bleeding. was recently treated with z-pack for bronchitis per patient. denies prior colonoscopy/endoscopy in the past. Denies NSAID use.   In the ER, patient hypotensive with SBP in the 70s on arrival. Placed on 4L nasal cannula for shortness of breath and hypoxia by ER team. 1 unit of PRBC started aprox 1 hour ago, 2 units prbc in total. chest xray shows LLL PNA - ceftriaxone and vancomycin given. Potassium 6.8 w/ no acute ischemic changes on EKG. 10U IVP insulin, amp of d50, 2g calcium gluconate and albuterol given for hyperK treatment in the ER. guaiac positive, GI consulted and plan is for EGD tomorrow morning.     88F with past medical history as listed admitted 1/28 with shortness of breath, gastrointestinal bleed, acute blood loss anemia s/p EGD with ulcer seen in second part of duodenum s/p clipping 1/29. Post EGD, patient continued to have concerns for bleeding, s/p IR embolization with right gastroepiploic, SPDA, GDA proper 1/30. Course complicated by septic shock due to klebsiella and enterococcus bacteremia ? source. Overnight patient developed new onset AFIB with RVR, hypotension and chest pain, NSTEMI.  Palliative consulted for complex medical decision making in the setting of likely life-limiting illness.     PERTINENT PMH REVIEWED: Yes     PAST MEDICAL & SURGICAL HISTORY:  Hypertension  History of Hyperthyroidism  Hyperlipemia  Diabetes mellitus type II  S/P TKR (total knee replacement) left knee  Carpal tunnel syndrome on both sides  History of cataract extraction bilateral  H/O total hysterectomy  History of laminectomy    Per Chart: SOCIAL HISTORY:  lives with son                     Substance history: non smoker                     Admitted from:  home                     Jainism/spirituality:                    Cultural concerns: none                       Surrogate - sons Juan  and Austin      FAMILY HISTORY: unable to obtain at thie time due to patients poor mental status     Allergies    No Known Allergies    ADVANCE DIRECTIVES/TREATMENT PREFERENCES:  DNR/DNI - MOLST, continue all other medical treatments    Baseline ADLs (prior to admission):  Independent/ Dependent      Karnofsky/Palliative Performance Status Version 2:  %  http://npcrc.org/files/news/palliative_performance_scale_ppsv2.pdf    Present Symptoms:     Dyspnea: none   Nausea/Vomiting: No  Anxiety:  No  Depression: unable   Fatigue: unable   Loss of appetite: unable   Constipation: none     Pain: none currently             Character-            Duration-            Effect-            Factors-            Frequency-            Location-            Severity-    Pain AD Score:  [email or web address] (press ctrl + left click to view)    Review of Systems: Reviewed                    Unable to obtain due to poor mentation   All others negative    MEDICATIONS  (STANDING):  albuterol/ipratropium for Nebulization 3 milliLiter(s) Nebulizer every 6 hours  chlorhexidine 2% Cloths 1 Application(s) Topical <User Schedule>  dextrose 50% Injectable 25 Gram(s) IV Push once  dextrose 50% Injectable 12.5 Gram(s) IV Push once  dextrose 50% Injectable 25 Gram(s) IV Push once  furosemide   Injectable 80 milliGRAM(s) IV Push two times a day  glucagon  Injectable 1 milliGRAM(s) IntraMuscular once  influenza  Vaccine (HIGH DOSE) 0.7 milliLiter(s) IntraMuscular once  insulin glargine Injectable (LANTUS) 20 Unit(s) SubCutaneous at bedtime  insulin lispro (ADMELOG) corrective regimen sliding scale   SubCutaneous every 6 hours  insulin lispro Injectable (ADMELOG) 2 Unit(s) SubCutaneous three times a day before meals  iron sucrose IVPB 100 milliGRAM(s) IV Intermittent every 24 hours  levothyroxine 125 MICROGram(s) Oral at bedtime  norepinephrine Infusion 0.05 MICROgram(s)/kG/Min (8.93 mL/Hr) IV Continuous <Continuous>  pantoprazole Infusion 8 mG/Hr (10 mL/Hr) IV Continuous <Continuous>  pantoprazole Infusion 8 mG/Hr (10 mL/Hr) IV Continuous <Continuous>  phenylephrine    Infusion 0.5 MICROgram(s)/kG/Min (17.9 mL/Hr) IV Continuous <Continuous>  piperacillin/tazobactam IVPB.. 3.375 Gram(s) IV Intermittent every 12 hours  potassium chloride  10 mEq/50 mL IVPB 10 milliEquivalent(s) IV Intermittent once  sertraline 50 milliGRAM(s) Oral daily  traZODone 50 milliGRAM(s) Oral at bedtime    MEDICATIONS  (PRN):  melatonin 5 milliGRAM(s) Oral at bedtime PRN Sleep  ondansetron Injectable 4 milliGRAM(s) IV Push every 6 hours PRN Nausea and/or Vomiting  sodium chloride 0.9% lock flush 10 milliLiter(s) IV Push every 1 hour PRN Pre/post blood products, medications, blood draw, and to maintain line patency    PHYSICAL EXAM:    Vital Signs Last 24 Hrs  T(C): 36.6 (01 Feb 2023 09:00), Max: 37.1 (31 Jan 2023 21:00)  T(F): 97.9 (01 Feb 2023 09:00), Max: 98.7 (31 Jan 2023 21:00)  HR: 89 (01 Feb 2023 09:00) (83 - 150)  BP: 100/50 (01 Feb 2023 09:00) (59/44 - 147/134)  BP(mean): 66 (01 Feb 2023 09:00) (51 - 140)  RR: 23 (01 Feb 2023 09:00) (15 - 35)  SpO2: 100% (01 Feb 2023 09:00) (83% - 100%)    Parameters below as of 01 Feb 2023 08:49  Patient On (Oxygen Delivery Method): nasal cannula,4 lpm    General: lethargic     HEENT: normal     Lungs: comfortable     CV: normal      GI: normal      : thomas    MSK: weakness     Neuro: no focal deficits     Skin: no rash    LABS:                      10.1   12.35 )-----------( 195      ( 01 Feb 2023 05:00 )             30.5     02-01    140  |  106  |  63.2<H>  ----------------------------<  145<H>  3.8   |  21.0<L>  |  2.53<H>    Ca    8.4      01 Feb 2023 05:00  Phos  4.4     02-01  Mg     2.4     02-01    TPro  4.9<L>  /  Alb  2.8<L>  /  TBili  0.4  /  DBili  x   /  AST  52<H>  /  ALT  24  /  AlkPhos  60  02-01    I&O's Summary    31 Jan 2023 07:01  -  01 Feb 2023 07:00  --------------------------------------------------------  IN: 3967.4 mL / OUT: 2052 mL / NET: 1915.4 mL    01 Feb 2023 07:01  -  01 Feb 2023 10:05  --------------------------------------------------------  IN: 130.8 mL / OUT: 72 mL / NET: 58.8 mL    RADIOLOGY & ADDITIONAL STUDIES:    < from: Xray Chest 1 View- PORTABLE-Urgent (Xray Chest 1 View- PORTABLE-Urgent .) (01.31.23 @ 09:22) >  INTERPRETATION:  AP chest on January 31, 2023 at 9:11 AM. Patient has a   GI bleed and is short of breath.    Heart magnified by technique. However aortic valve again noted.    There is an increasing left base effusion now mild and is significant   increase right base infiltrate with pleural component.    Incidental azygous lobe.    IMPRESSION: Increasing bilateral lung and pleural findings.    --- End of Report ---    MARTINE BARRERA MD; Attending Radiologist  This document has been electronically signed. Jan 31 2023 12:15PM    < end of copied text >    < from: Xray Hip 2-3 Views, Left (01.28.23 @ 19:00) >    IMPRESSION:  1.  No fracture.  2.  Status post total left knee replacement. No evidence of loosening or   disruption of hardware.    --- End of Report ---    < end of copied text >    < from: Xray Hip 2-3 Views, Left (01.28.23 @ 19:00) >    IMPRESSION:  1.  No fracture.  2.  Status post total left knee replacement. No evidence of loosening or   disruption of hardware.    --- End of Report ---    < end of copied text >    < from: EGD (01.29.23 @ 07:26) >  Impressions:        Normal mucosa in the gastroesophageal junction and whole esophagus.        Normal mucosa in the stomach.        Normal mucosa in the First part of the duodenum.        Ulcer in the second part of the duodenum.        Ulcer in the second part of the duodenal. (Thermal Therapy, Endoclip).    < end of copied text >    Culture - Blood (01.30.23 @ 08:45)    Specimen Source: .Blood Blood    Culture Results:   No growth to date.    Culture - Blood (01.30.23 @ 08:45)    Specimen Source: .Blood Blood    Culture Results:   No growth to date.    Culture - Blood (01.28.23 @ 11:15)    -  Gentamicin synergy: S <=500    -  Streptomycin synergy: S <=1000    -  Coagulase negative Staphylococcus: Detec    -  K. pneumoniae group: Detec (K. pneumoniae, K. quasipneumoniae, K. variicola)    -  Enterococcus faecalis: Detec    Gram Stain:   Growth in aerobic bottle: Gram Negative Rods and Gram positive cocci in  pairs  Growth in anaerobic bottle: Gram Negative Rods and Gram positive cocci in  pairs    -  Amikacin: S <=16    -  Ampicillin: R >16 These ampicillin results predict results for amoxicillin    -  Ampicillin: S <=2 Predicts results to ampicillin/sulbactam, amoxacillin-clavulanate and  piperacillin-tazobactam.    -  Ampicillin/Sulbactam: S 8/4 Enterobacter, Klebsiella aerogenes, Citrobacter, and Serratia may develop resistance during prolonged therapy (3-4 days)    -  Aztreonam: S <=4    -  Cefazolin: S <=2 Enterobacter, Klebsiella aerogenes, Citrobacter, and Serratia may develop resistance during prolonged therapy (3-4 days)    -  Cefepime: S <=2    -  Cefoxitin: S <=8    -  Ceftriaxone: S <=1 Enterobacter, Klebsiella aerogenes, Citrobacter, and Serratia may develop resistance during prolonged therapy    -  Ciprofloxacin: S <=0.25    -  Ertapenem: S <=0.5    -  Gentamicin: S <=2    -  Imipenem: S <=1    -  Levofloxacin: S <=0.5    -  Meropenem: S <=1    -  Piperacillin/Tazobactam: S <=8    -  Tobramycin: S <=2    -  Trimethoprim/Sulfamethoxazole: S <=0.5/9.5    -  Vancomycin: S 4    Specimen Source: .Blood Blood-Peripheral    Organism: Blood Culture PCR    Organism: Klebsiella pneumoniae    Organism: Enterococcus faecalis    Culture Results:   Growth in aerobic and anaerobic bottles: Klebsiella pneumoniae  Growth in aerobic and anaerobic bottles: Enterococcus faecalis  Growth in aerobic bottle: Coag Negative Staphylococcus Unable to Identify  further  Coag Negative Staphylococcus  Singleset isolate, possible contaminant. Contact  Microbiology if susceptibility testing clinically  indicated.  ***Blood Panel PCR results on this specimen are available  approximately 3 hours after the Gram stain result.***  Gram stain, PCR, and/or culture results may not always  correspond due to difference in methodologies.  ************************************************************  This PCR assay was performed by multiplex PCR. This  Assay tests for 66 bacterial and resistance gene targets.  Please refer to the [facility] test directory  at [email or web address] for details.    Organism Identification: Blood Culture PCR  Klebsiella pneumoniae  Enterococcus faecalis    Method Type: PCR    Method Type: SANDRA    Method Type: SANDRA    Culture - Blood (01.28.23 @ 10:08)    Specimen Source: .Blood Blood-Peripheral    Culture Results:   No growth to date.

## 2023-02-01 NOTE — PROGRESS NOTE ADULT - SUBJECTIVE AND OBJECTIVE BOX
Patient is a 88y old  Female who presents with a chief complaint of GIB (30 Jan 2023 14:56)      BRIEF HOSPITAL COURSE: 87 y/o F with a h/o TAVR, CAD, on ASA/plavix, HTN, DM, CKD, admitted on 1/28 with complaints of shortness of breath x 1 day and endorsed recurrent bloody bowel movements for the past several weeks. H/H 6.7/22. Hypotensive. CXR suggestive of LLL pneumonia. One set of blood cultures was polymicrobial (klebsiella, enterococcus, coag neg staph). Started on IV vasopressor. S/p EGD 1/29 revealing of 8mm D2 non-bleeding ulcer, 10mm D2 cratered ulcer with visible vessel s/p cautery, s/p Endoclip x 2. Continued episodes of bloody bowel movements s/p EGD. Subsequently went for IR embolization of R gastroepiploic, SPDA and GDA proper on 1/30.    Events last 24 hours: Called to the bedside overnight by RN as patient developed new onset AF with RVR with progressive hypotension and altered mentation. She began to complain of severe left sided chest pressure. Lateral ST-depressions on ECG. Trop elevated to 0.48. Hgb 9.5 --> 7.9. No clinical evidence of active bleeding. In fact, she had 2 large normal appearing bowel movements prior to symptom onset. She had been undergoing aggressive IV diuresis for suspected pulmonary edema. Volume resuscitation with IV crystalloid and 1u PRBC ongoing. Started on IV vasopressors. Her sons have come into the hospital at this time to be with her given her deteriorating state.      PAST MEDICAL & SURGICAL HISTORY:  Hypertension      History of Hyperthyroidism      Hyperlipemia      Diabetes mellitus type II      S/P TKR (total knee replacement)  left knee      Carpal tunnel syndrome on both sides      History of cataract extraction  bilateral      H/O total hysterectomy      History of laminectomy          Review of Systems:  CONSTITUTIONAL: No fever, chills, (+) fatigue  EYES: No eye pain, visual disturbances, or discharge  ENMT:  No difficulty hearing, tinnitus, vertigo; No sinus or throat pain  NECK: No pain or stiffness  RESPIRATORY: No cough, wheezing, chills or hemoptysis; (+) shortness of breath  CARDIOVASCULAR: (+) chest pressure, no palpitations, dizziness, or leg swelling  GASTROINTESTINAL: No abdominal or epigastric pain. No nausea, vomiting, or hematemesis; No diarrhea or constipation. No melena or hematochezia.  GENITOURINARY: No dysuria, frequency, hematuria, or incontinence  NEUROLOGICAL: No headaches, memory loss, loss of strength, numbness, or tremors  SKIN: No itching, burning, rashes, or lesions   MUSCULOSKELETAL: No joint pain or swelling; No muscle, back, or extremity pain  PSYCHIATRIC: No depression, anxiety, mood swings, or difficulty sleeping      Medications:  piperacillin/tazobactam IVPB.. 3.375 Gram(s) IV Intermittent every 12 hours  norepinephrine Infusion 0.05 MICROgram(s)/kG/Min IV Continuous <Continuous>  phenylephrine    Infusion 0.5 MICROgram(s)/kG/Min IV Continuous <Continuous>  albuterol/ipratropium for Nebulization 3 milliLiter(s) Nebulizer every 6 hours  melatonin 5 milliGRAM(s) Oral at bedtime PRN  ondansetron Injectable 4 milliGRAM(s) IV Push every 6 hours PRN  sertraline 50 milliGRAM(s) Oral daily  traZODone 50 milliGRAM(s) Oral at bedtime  pantoprazole Infusion 8 mG/Hr IV Continuous <Continuous>  dextrose 50% Injectable 25 Gram(s) IV Push once  dextrose 50% Injectable 12.5 Gram(s) IV Push once  dextrose 50% Injectable 25 Gram(s) IV Push once  glucagon  Injectable 1 milliGRAM(s) IntraMuscular once  insulin glargine Injectable (LANTUS) 20 Unit(s) SubCutaneous at bedtime  insulin lispro (ADMELOG) corrective regimen sliding scale   SubCutaneous every 6 hours  insulin lispro Injectable (ADMELOG) 2 Unit(s) SubCutaneous three times a day before meals  levothyroxine 125 MICROGram(s) Oral at bedtime  iron sucrose IVPB 100 milliGRAM(s) IV Intermittent every 24 hours  sodium chloride 0.9% lock flush 10 milliLiter(s) IV Push every 1 hour PRN  influenza  Vaccine (HIGH DOSE) 0.7 milliLiter(s) IntraMuscular once  chlorhexidine 2% Cloths 1 Application(s) Topical <User Schedule>            ICU Vital Signs Last 24 Hrs  T(C): 36.6 (01 Feb 2023 00:35), Max: 37.1 (31 Jan 2023 21:00)  T(F): 97.9 (01 Feb 2023 00:35), Max: 98.7 (31 Jan 2023 21:00)  HR: 122 (01 Feb 2023 00:45) (92 - 150)  BP: 62/46 (01 Feb 2023 00:35) (62/46 - 144/121)  BP(mean): 53 (01 Feb 2023 00:35) (52 - 130)  ABP: --  ABP(mean): --  RR: 31 (01 Feb 2023 00:35) (16 - 31)  SpO2: 98% (01 Feb 2023 00:45) (83% - 100%)    O2 Parameters below as of 01 Feb 2023 00:45  Patient On (Oxygen Delivery Method): nasal cannula,2L/M                I&O's Detail    30 Jan 2023 07:01  -  31 Jan 2023 07:00  --------------------------------------------------------  IN:    IV PiggyBack: 200 mL    IV PiggyBack: 250 mL    IV PiggyBack: 100 mL    Norepinephrine: 116.1 mL    Oral Fluid: 240 mL    Pantoprazole: 240 mL    PRBCs (Packed Red Blood Cells): 300 mL  Total IN: 1446.1 mL    OUT:    Indwelling Catheter - Urethral (mL): 1540 mL  Total OUT: 1540 mL    Total NET: -93.9 mL      31 Jan 2023 07:01  -  01 Feb 2023 02:28  --------------------------------------------------------  IN:    IV PiggyBack: 100 mL    IV PiggyBack: 100 mL    Lactated Ringers Bolus: 2000 mL    Pantoprazole: 170 mL  Total IN: 2370 mL    OUT:    Indwelling Catheter - Urethral (mL): 1955 mL  Total OUT: 1955 mL    Total NET: 415 mL            LABS:                        7.9    8.53  )-----------( 154      ( 01 Feb 2023 00:04 )             24.0     02-01    141  |  105  |  68.8<H>  ----------------------------<  156<H>  3.9   |  23.0  |  2.52<H>    Ca    8.1<L>      01 Feb 2023 00:04  Phos  3.9     02-01  Mg     2.0     02-01        CARDIAC MARKERS ( 01 Feb 2023 00:04 )  x     / 0.48 ng/mL / x     / x     / x          CAPILLARY BLOOD GLUCOSE      POCT Blood Glucose.: 181 mg/dL (01 Feb 2023 00:15)        CULTURES:  Culture Results:   No growth to date. (01-30-23 @ 08:45)  Culture Results:   No growth to date. (01-30-23 @ 08:45)  Culture Results:   Growth in aerobic and anaerobic bottles: Klebsiella pneumoniae  Growth in aerobic and anaerobic bottles: Enterococcus faecalis  Growth in aerobic bottle: Coag Negative Staphylococcus Unable to Identify  further  Coag Negative Staphylococcus  Singleset isolate, possible contaminant. Contact  Microbiology if susceptibility testing clinically  indicated.  ***Blood Panel PCR results on this specimen are available  approximately 3 hours after the Gram stain result.***  Gram stain, PCR, and/or culture results may not always  correspond due to difference in methodologies.  ************************************************************  This PCR assay was performed by multiplex PCR. This  Assay tests for 66 bacterial and resistance gene targets.  Please refer to the [facility] test directory  at [email or web address] for details. (01-28-23 @ 11:15)  Rapid RVP Result: NotDetec (01-28-23 @ 10:08)  Culture Results:   No growth to date. (01-28-23 @ 10:08)        Physical Examination:    General: uncomfortable and toxic appearing, sitting up in bed, lethargic    HEENT: Pupils equal, reactive to light.  Symmetric.    PULM: diminished at lung bases bilaterally, no significant sputum production    CVS: tachycardic, irreg irreg rhythm, no murmurs, rubs, or gallops    ABD: Soft, nondistended, nontender, normoactive bowel sounds, no masses    EXT: No edema, nontender    SKIN: Warm and pale    NEURO: very lethargic, follows commands, moves all extremities purposefully        RADIOLOGY:     < from: Xray Chest 1 View- PORTABLE-Urgent (Xray Chest 1 View- PORTABLE-Urgent .) (01.31.23 @ 09:22) >  Heart magnified by technique. However aortic valve again noted.    There is an increasing left base effusion now mild and is significant   increase right base infiltrate with pleural component.    Incidental azygous lobe.    IMPRESSION: Increasing bilateral lung and pleural findings.          CRITICAL CARE TIME SPENT: 67 mins  Time spent evaluating/treating patient with medical issues that pose a high risk for life threatening deterioration and/or end-organ damage, reviewing data/labs/imaging, discussing case with multidisciplinary team, discussing plan/goals of care with patient/family. Non-inclusive of procedure time.

## 2023-02-01 NOTE — PROGRESS NOTE ADULT - ASSESSMENT
87 yo female with hx of AS s/p TAVR, CAD, DM, CKD, presented with GI bleed, underwent EGD along with IR embolization, also found to have UTI, polymicrobial bacteremia.   Overnight developed NSTEMI, afib with RVR, hypotension.    Plan    NSTEMI:  Unable to anticoagulate given recent massive GI bleed.  No beta blocker due to shock state.    GI bleed:  stable, no further bleeding since embolization    CHFpEF/ pulmonary edema:  Diuresis with IV lasix    DM:  Blood sugars controlled with lantus and lispro    Sedation/Analgesia: none  Vasoactive medications: phenylephrine  DVT prophylaxis: SCDs  GI prophylaxis: protonix  Nutrition: regular diet  Central line: remove when off pressor  Mobility: OOB  Family/Patient engagement/GOC: updated 2 sons at bedside this morning extensively on current condition and prognosis  .

## 2023-02-01 NOTE — PROGRESS NOTE ADULT - SUBJECTIVE AND OBJECTIVE BOX
[facility]  INFECTIOUS DISEASES at [place] and [place]  =======================================================                               Luis Villarreal MD#   Melba Marquis MD*                             Niki Carroll MD*   Kiana Kam MD*            Diplomates American Board of Internal Medicine & Infectious Diseases                # [place] Office - Appt - Tel  [phone] Fax [phone]                * [place] Office - Appt - Tel [phone] Fax [phone]                                  Hospital Consult line:  [phone]  =======================================================    TATE CHEN [id number]    Follow up: Bacteremia    On pressors  More lethargic today       Allergies:  No Known Allergies           REVIEW OF SYSTEMS:  Unable to obtain due to medical condition       Physical Exam:  GEN: lethargic   HEENT: normocephalic and atraumatic. EOMI. PERRL.    NECK: Supple.   LUNGS: Decreased basal BS B/L   HEART: RRR  ABDOMEN: Soft, NT, ND.  +BS.    : [place]   EXTREMITIES: trace edema.  MSK: No joint swelling  NEUROLOGIC: Wakes to stimuli   PSYCHIATRIC: not answering questions   SKIN: No rash      Vitals:  T(F): 97.9 (01 Feb 2023 09:00), Max: 98.7 (31 Jan 2023 21:00)  HR: 89 (01 Feb 2023 09:00)  BP: 100/50 (01 Feb 2023 09:00)  RR: 23 (01 Feb 2023 09:00)  SpO2: 100% (01 Feb 2023 09:00) (83% - 100%)  temp max in last 48H T(F): , Max: 98.7 (01-31-23 @ 21:00)      Current Antibiotics:  piperacillin/tazobactam IVPB.. 3.375 Gram(s) IV Intermittent every 12 hours    Other medications:  albuterol/ipratropium for Nebulization 3 milliLiter(s) Nebulizer every 6 hours  chlorhexidine 2% Cloths 1 Application(s) Topical <User Schedule>  dextrose 50% Injectable 25 Gram(s) IV Push once  dextrose 50% Injectable 12.5 Gram(s) IV Push once  dextrose 50% Injectable 25 Gram(s) IV Push once  furosemide   Injectable 80 milliGRAM(s) IV Push two times a day  glucagon  Injectable 1 milliGRAM(s) IntraMuscular once  influenza  Vaccine (HIGH DOSE) 0.7 milliLiter(s) IntraMuscular once  insulin glargine Injectable (LANTUS) 20 Unit(s) SubCutaneous at bedtime  insulin lispro (ADMELOG) corrective regimen sliding scale   SubCutaneous every 6 hours  insulin lispro Injectable (ADMELOG) 2 Unit(s) SubCutaneous three times a day before meals  iron sucrose IVPB 100 milliGRAM(s) IV Intermittent every 24 hours  levothyroxine 125 MICROGram(s) Oral at bedtime  norepinephrine Infusion 0.05 MICROgram(s)/kG/Min IV Continuous <Continuous>  pantoprazole Infusion 8 mG/Hr IV Continuous <Continuous>  pantoprazole Infusion 8 mG/Hr IV Continuous <Continuous>  phenylephrine    Infusion 0.5 MICROgram(s)/kG/Min IV Continuous <Continuous>  potassium chloride  10 mEq/50 mL IVPB 10 milliEquivalent(s) IV Intermittent once  sertraline 50 milliGRAM(s) Oral daily  traZODone 50 milliGRAM(s) Oral at bedtime                 10.1   12.35 )-----------( 195      ( 01 Feb 2023 05:00 )             30.5     02-01    140  |  106  |  63.2<H>  ----------------------------<  145<H>  3.8   |  21.0<L>  |  2.53<H>    Ca    8.4      01 Feb 2023 05:00  Phos  4.4     02-01  Mg     2.4     02-01    TPro  4.9<L>  /  Alb  2.8<L>  /  TBili  0.4  /  DBili  x   /  AST  52<H>  /  ALT  24  /  AlkPhos  60  02-01      RECENT CULTURES:  01-30 @ 08:45 .Blood Blood     No growth to date.    01-28 @ 11:15 .Blood Blood-Peripheral Blood Culture PCR  Klebsiella pneumoniae  Enterococcus faecalis    Growth in aerobic and anaerobic bottles: Klebsiella pneumoniae  Growth in aerobic and anaerobic bottles: Enterococcus faecalis  Growth in aerobic bottle: Coag Negative Staphylococcus Unable to Identify  further  Coag Negative Staphylococcus  Singleset isolate, possible contaminant. Contact  Microbiology if susceptibility testing clinically indicated.  ***Blood Panel PCR results on this specimen are available  approximately 3 hours after the Gram stain result.***  Gram stain, PCR, and/or culture results may not always  correspond due to difference in methodologies.  ************************************************************  This PCR assay was performed by multiplex PCR. This  Assay tests for 66 bacterial and resistance gene targets.  Please refer to the [facility] test directory  at [email or web address] for details.  Growth in aerobic bottle: Gram Negative Rods and Gram positive cocci in pairs  Growth in anaerobic bottle: Gram Negative Rods and Gram positive cocci in pairs    01-28 @ 10:08 .Blood Blood-Peripheral     No growth to date.      WBC Count: 12.35 K/uL (02-01-23 @ 05:00)  WBC Count: 8.53 K/uL (02-01-23 @ 00:04)  WBC Count: 13.66 K/uL (01-31-23 @ 04:00)  WBC Count: 13.23 K/uL (01-30-23 @ 21:29)  WBC Count: 9.25 K/uL (01-30-23 @ 16:56)  WBC Count: 10.38 K/uL (01-30-23 @ 03:40)  WBC Count: 10.78 K/uL (01-29-23 @ 21:30)  WBC Count: 10.19 K/uL (01-29-23 @ 18:11)  WBC Count: 15.40 K/uL (01-29-23 @ 10:16)  WBC Count: 10.92 K/uL (01-29-23 @ 04:11)  WBC Count: 11.16 K/uL (01-28-23 @ 19:57)  WBC Count: 8.79 K/uL (01-28-23 @ 10:08)    Creatinine, Serum: 2.53 mg/dL (02-01-23 @ 05:00)  Creatinine, Serum: 2.52 mg/dL (02-01-23 @ 00:04)  Creatinine, Serum: 2.33 mg/dL (01-31-23 @ 04:00)  Creatinine, Serum: 2.46 mg/dL (01-30-23 @ 03:40)  Creatinine, Serum: 2.47 mg/dL (01-29-23 @ 21:30)  Creatinine, Serum: 2.48 mg/dL (01-29-23 @ 17:00)  Creatinine, Serum: 2.35 mg/dL (01-29-23 @ 10:16)  Creatinine, Serum: 2.37 mg/dL (01-29-23 @ 04:11)  Creatinine, Serum: 2.25 mg/dL (01-28-23 @ 17:20)  Creatinine, Serum: 2.38 mg/dL (01-28-23 @ 10:08)    Procalcitonin, Serum: 0.46 ng/mL (01-29-23 @ 04:11)  Procalcitonin, Serum: 0.37 ng/mL (01-28-23 @ 21:28)     SARS-CoV-2: NotDetec (01-28-23 @ 10:08)      < from: Xray Chest 1 View- PORTABLE-Urgent (Xray Chest 1 View- PORTABLE-Urgent .) (01.31.23 @ 09:22) >    ACC: [id number] EXAM:  XR CHEST PORTABLE URGENT 1V   ORDERED BY: KOURTNEY CLIFFORD     PROCEDURE DATE:  01/31/2023      INTERPRETATION:  AP chest on January 31, 2023 at 9:11 AM. Patient has a   GI bleed and is short of breath.    Heart magnified by technique. However aortic valve again noted.    There is an increasing left base effusion now mild and is significant   increase right base infiltrate with pleural component.    Incidental azygous lobe.    IMPRESSION: Increasing bilateral lung and pleural findings.    --- End of Report ---  < end of copied text >      < from: TTE Echo Complete w/o Contrast w/ Doppler (01.31.23 @ 13:36) >  Summary:   1. Technically difficult study.   2. Left ventricular ejection fraction, by visual estimation, is 55 to 60%.   3. Spectral Doppler shows impaired relaxation pattern of left   ventricular myocardial filling (Grade I diastolic dysfunction).   4. There is mild concentric left ventricular hypertrophy.   5. Mild mitral valve regurgitation.   6. Moderate mitralannular calcification.   7. Thickening and calcification of the anterior and posterior mitral   valve leaflets.   8. Mild tricuspid regurgitation.   9. TAVR in the aortic position.  10. Peak aortic valve gradient is 9.9 mmHg and the mean gradient is 5.5   mmHg, which is probably normal in the setting of a prosthetic aortic valve.  11. Estimated pulmonary artery systolic pressure is 47.4 mmHg assuming a   right atrial pressure of 8 mmHg, which is consistent with mild pulmonary   hypertension.  12. Suboptimal study for evaluation of endocarditis, consider LISSA if   clinically indicated.    < end of copied text >

## 2023-02-01 NOTE — PROGRESS NOTE ADULT - SUBJECTIVE AND OBJECTIVE BOX
Interval HPI:  difficult night, dropped BP, required pressors, elevated troponins, afib with rvr  night team though she might be bleeding and transfused - no active bleeding - Hb responded appropriately   now better, weaning pressors    Exam:  sleeping, arousable, confused but nad  obese  bilat air entry  sinus rhythm  abd soft nontender  trace edema    Radiology: cxr - bibasilar pleural effusions     On Admission  01-28-23 (4d)  HPI:      Patient is a 88y old  Female who presents with a chief complaint of shortness of breath     BRIEF HOSPITAL COURSE: 89 y/o F hx of aortic valve repair, CAD on ASA/plavix, HTN, DM presented with shortness of breath for the past 1 day. endorsing bloody bowel movements for the "past several weeks." Denies prior episodes of GI bleeding. was recently treated with z-pack for bronchitis per patient. denies prior colonoscopy/endoscopy in the past. Denies NSAID use.   In the ER, patient hypotensive with SBP in the 70s on arrival. Placed on 4L nasal cannula for shortness of breath and hypoxia by ER team. 1 unit of PRBC started aprox 1 hour ago, 2 units prbc in total. chest xray shows LLL PNA - ceftriaxone and vancomycin given. Potassium 6.8 w/ no acute ischemic changes on EKG. 10U IVP insulin, amp of d50, 2g calcium gluconate and albuterol given for hyperK treatment in the ER. guaiac positive, GI consulted and plan is for EGD tomorrow morning.       PAST MEDICAL & SURGICAL HISTORY:    Allergies    No Known Allergies    Intolerances          Review of Systems:  CONSTITUTIONAL: No fever, chills, or fatigue  EYES: No eye pain, visual disturbances, or discharge  ENMT:  No difficulty hearing, tinnitus, vertigo; No sinus or throat pain  NECK: No pain or stiffness  RESPIRATORY: No cough, wheezing, chills or hemoptysis; + shortness of breath  CARDIOVASCULAR: No chest pain, palpitations, dizziness, or leg swelling  GASTROINTESTINAL: Denies abdominal or epigastric pain. No nausea, vomiting, or hematemesis; No diarrhea or constipation. +melena or hematochezia.  GENITOURINARY: No dysuria, frequency, hematuria, or incontinence  NEUROLOGICAL: No headaches, memory loss, loss of strength, numbness, or tremors  SKIN: No itching, burning, rashes, or lesions   MUSCULOSKELETAL: No joint pain or swelling; No muscle, back, or extremity pain  PSYCHIATRIC: No depression, anxiety, mood swings, or difficulty sleeping      Medications:              pantoprazole Infusion 8 mG/Hr IV Continuous <Continuous>                      ICU Vital Signs Last 24 Hrs  T(C): 36.7 (28 Jan 2023 12:10), Max: 37.1 (28 Jan 2023 09:03)  T(F): 98 (28 Jan 2023 12:10), Max: 98.8 (28 Jan 2023 09:03)  HR: 82 (28 Jan 2023 13:55) (64 - 82)  BP: 85/42 (28 Jan 2023 13:55) (70/44 - 122/53)  BP(mean): --  ABP: --  ABP(mean): --  RR: 20 (28 Jan 2023 13:55) (18 - 24)  SpO2: 95% (28 Jan 2023 13:55) (93% - 100%)    O2 Parameters below as of 28 Jan 2023 13:55  Patient On (Oxygen Delivery Method): room air          Vital Signs Last 24 Hrs  T(C): 36.7 (28 Jan 2023 12:10), Max: 37.1 (28 Jan 2023 09:03)  T(F): 98 (28 Jan 2023 12:10), Max: 98.8 (28 Jan 2023 09:03)  HR: 82 (28 Jan 2023 13:55) (64 - 82)  BP: 85/42 (28 Jan 2023 13:55) (70/44 - 122/53)  BP(mean): --  RR: 20 (28 Jan 2023 13:55) (18 - 24)  SpO2: 95% (28 Jan 2023 13:55) (93% - 100%)    Parameters below as of 28 Jan 2023 13:55  Patient On (Oxygen Delivery Method): room air            I&O's Detail        LABS:                        6.7    8.79  )-----------( 188      ( 28 Jan 2023 10:08 )             22.3     01-28    140  |  104  |  82.6<H>  ----------------------------<  299<H>  6.8<HH>   |  22.0  |  2.38<H>    Ca    9.6      28 Jan 2023 10:08  Mg     2.1     01-28    TPro  4.6<L>  /  Alb  2.6<L>  /  TBili  <0.2<L>  /  DBili  x   /  AST  22  /  ALT  10  /  AlkPhos  70  01-28      CARDIAC MARKERS ( 28 Jan 2023 10:08 )  x     / <0.01 ng/mL / x     / x     / x          CAPILLARY BLOOD GLUCOSE        PT/INR - ( 28 Jan 2023 10:08 )   PT: 12.2 sec;   INR: 1.05 ratio         PTT - ( 28 Jan 2023 10:08 )  PTT:24.2 sec    CULTURES:      Physical Examination:    General: No acute distress.  Alert, oriented, interactive, nonfocal. pale appearing female.     HEENT: Pupils equal, reactive to light.  Symmetric.    PULM: Clear to auscultation bilaterally, no significant sputum production    CVS: Regular rate and rhythm, no murmurs, rubs, or gallops    ABD: Soft, nondistended, nontender, normoactive bowel sounds, no masses    EXT: No edema, nontender    SKIN: Warm and well perfused, no rashes noted.    CRITICAL CARE TIME SPENT: 30 minutes    (28 Jan 2023 14:07)    PAST MEDICAL & SURGICAL HISTORY:  Hypertension      History of Hyperthyroidism      Hyperlipemia      Diabetes mellitus type II      S/P TKR (total knee replacement)  left knee      Carpal tunnel syndrome on both sides      History of cataract extraction  bilateral      H/O total hysterectomy      History of laminectomy          Antimicrobial:  piperacillin/tazobactam IVPB.. 3.375 Gram(s) IV Intermittent every 12 hours    Cardiovascular:  furosemide   Injectable 80 milliGRAM(s) IV Push two times a day  phenylephrine    Infusion 0.5 MICROgram(s)/kG/Min IV Continuous <Continuous>    Pulmonary:  albuterol/ipratropium for Nebulization 3 milliLiter(s) Nebulizer every 6 hours    Hematalogic:    Other:  acetaminophen   IVPB .. 1000 milliGRAM(s) IV Intermittent once  chlorhexidine 2% Cloths 1 Application(s) Topical <User Schedule>  dextrose 50% Injectable 25 Gram(s) IV Push once  dextrose 50% Injectable 12.5 Gram(s) IV Push once  dextrose 50% Injectable 25 Gram(s) IV Push once  glucagon  Injectable 1 milliGRAM(s) IntraMuscular once  influenza  Vaccine (HIGH DOSE) 0.7 milliLiter(s) IntraMuscular once  insulin glargine Injectable (LANTUS) 20 Unit(s) SubCutaneous at bedtime  insulin lispro (ADMELOG) corrective regimen sliding scale   SubCutaneous every 6 hours  insulin lispro Injectable (ADMELOG) 2 Unit(s) SubCutaneous three times a day before meals  iron sucrose IVPB 100 milliGRAM(s) IV Intermittent every 24 hours  levothyroxine 125 MICROGram(s) Oral at bedtime  melatonin 5 milliGRAM(s) Oral at bedtime PRN  nystatin Powder 1 Application(s) Topical two times a day  ondansetron Injectable 4 milliGRAM(s) IV Push every 6 hours PRN  pantoprazole Infusion 8 mG/Hr IV Continuous <Continuous>  sertraline 50 milliGRAM(s) Oral daily  sodium chloride 0.9% lock flush 10 milliLiter(s) IV Push every 1 hour PRN  traZODone 50 milliGRAM(s) Oral at bedtime      Drug Dosing Weight  Height (cm): 157.5 (28 Jan 2023 09:03)  Weight (kg): 95.3 (28 Jan 2023 09:03)  BMI (kg/m2): 38.4 (28 Jan 2023 09:03)  BSA (m2): 1.95 (28 Jan 2023 09:03)    T(C): 36.4 (02-01-23 @ 16:00), Max: 37.1 (01-31-23 @ 21:00)  HR: 82 (02-01-23 @ 16:00)  BP: 101/49 (02-01-23 @ 16:00)  BP(mean): 67 (02-01-23 @ 16:00)  ABP: --  ABP(mean): --  RR: 14 (02-01-23 @ 16:00)  SpO2: 100% (02-01-23 @ 16:00)          01-31 @ 07:01  -  02-01 @ 07:00  --------------------------------------------------------  IN: 3967.4 mL / OUT: 2052 mL / NET: 1915.4 mL              LABS:  CBC Full  -  ( 01 Feb 2023 13:52 )  WBC Count : 7.71 K/uL  RBC Count : 3.15 M/uL  Hemoglobin : 9.4 g/dL  Hematocrit : 28.5 %  Platelet Count - Automated : 165 K/uL  Mean Cell Volume : 90.5 fl  Mean Cell Hemoglobin : 29.8 pg  Mean Cell Hemoglobin Concentration : 33.0 gm/dL  Auto Neutrophil # : x  Auto Lymphocyte # : x  Auto Monocyte # : x  Auto Eosinophil # : x  Auto Basophil # : x  Auto Neutrophil % : x  Auto Lymphocyte % : x  Auto Monocyte % : x  Auto Eosinophil % : x  Auto Basophil % : x    02-01    143  |  107  |  67.6<H>  ----------------------------<  85  3.9   |  25.0  |  2.63<H>    Ca    8.2<L>      01 Feb 2023 13:52  Phos  4.3     02-01  Mg     2.4     02-01    TPro  4.9<L>  /  Alb  2.8<L>  /  TBili  0.4  /  DBili  x   /  AST  52<H>  /  ALT  24  /  AlkPhos  60  02-01        Culture Results:   No growth to date. (01-31 @ 08:35)  Culture Results:   No growth to date. (01-31 @ 08:15)  Culture Results:   No growth to date. (01-30 @ 08:45)  Culture Results:   No growth to date. (01-30 @ 08:45)    ____________________________________________________________________________________________________

## 2023-02-01 NOTE — PROGRESS NOTE ADULT - ASSESSMENT
89 y/o F with a h/o TAVR, CAD, on ASA/plavix, HTN, DM, CKD with:    # Undifferentiated shock  # New onset AF with RVR  # NSTEMI  # GIB (duodenal ulcer, s/p IR embo 1/30)  # Acute blood loss anemia  # UTI  # Lobar pneumonia   # Gram negative laura/GPC pairs bacteremia  # ANA CRISTINA    Concern for hypovolemic shock with possible rebleeding of known ulcer. Her Hgb has dropped 9.5 --> 7.9 in the setting of aggressive IV diuresis. However we have not yet seen any clinical evidence of bleeding. Also with concern for demand ischemia and resultant NSTEMI (ST-depressions in lateral leads on ECG and troponin elevated to 0.48). Bedside POCUS with very poor windows, unable to provide any meaningful guidance.    - 1L LR bolus x 2, discontinue IV furosemide  - transfuse 1u PRBC stat  - trend CBC, continue to transfuse blood product for Hgb < 8 and/or symptomatic bleeding  - started on IV phenylephrine infusion, norepi gtt added, actively titrating to maintain a MAP > 65  - she is not a candidate for anticoagulation or antiplatelet agents at this time  - continue empiric Zosyn and vancomycin dosed by level, repeat blood cultures are negative for growth  - ID input appreciated  - ANA CRISTINA secondary to ischemic ATN, optimize end-organ perfusion as above  - trend BUN/Cr, electrolytes, acid-base balance, and monitor UOP    I have contacted the patient's son, Juan Gomez, in the midst of Raquel's acute decompensation. I confirmed her DNR/DNI advanced directives and let him know that we are actively resuscitating her, however, she is in a critically ill state and given her comorbidities and goals of care, there is a high risk of cardiac arrest. They have come into the hospital to be with her at this time. Diagnoses, prognosis, and management plan outlined with both of the patient's sons at the bedside. All questions answered and concerns addressed.    Case discussed with MICU physician, Dr. Porter.

## 2023-02-01 NOTE — PROGRESS NOTE ADULT - ASSESSMENT
88y  Female with h/o TAVR, CAD, on ASA/plavix, HTN, DM, CKD. Patient was admitted on 1/28 with complaints of shortness of breath x 1 day and bloody bowel movements for the past several weeks. Patient was found to have H/H 6.7/22, Hypotensive. CXR suggestive of LLL pneumonia. Patient did not have evidence of active bleeding since admission to MICU. Started on IV vasopressor due to hypotension. Blood cultures are growing gram negative rods and gram positive cocci in pairs. 1/29 EGD remarkable for Duodenal ulcers s/p endoclip x 2. Patient continued to have multiple episodes of melena/hematochezia and taken to IR for coil embolization right gastroepiploic, SPDA, and GDA proper 1/30. Patient has been on Vancomycin and Zosyn since admission.        Klebsiella pneumoniae Bacteremia/Sepsis  Enterococcus faecalis  Bacteremia/sepsis  GI Bleed  Acute blood loss anemia   Leukocytosis   h/o TAVR         - Blood cultures 1/28 reporting Klebsiella pneumoniae, Enterococcus faecalis and 1 of 4 bottles with CoNS  - Repeat blood cultures 1/30 no growth   - CoNS likely contamination   - RVP/COVID 19 PCR 1/28 negative   - Check CT A/P  - TTE with no veg  - If CT A/P with no acute findings will need LISSA   - Procalcitonin level 0.46  - Continue Zosyn  - D/C Vancomycin  - Follow up cultures  - Trend Fever  - Trend WBC      Will Follow    d/w MICU team, clinical pharmacy and Dr Campbell

## 2023-02-01 NOTE — CONSULT NOTE ADULT - ASSESSMENT
88F with CAD, aortic valve repair, HTN, DM, admitted with acute blood loss anemia/gi bleed s/p EGD/IR intervention, course complicated by bacteremia/septic shock, and now new onset afib with RVR and NSTEMI.     #1 shock, mixed cardiogenic and ? septic, NSTEMI, Afib   - conservative measures  - not a candidate for anticoagulation due to recent gastrointestinal hemorrhage and thus not a candidate for further workup like cardiac cath  - wean pressors as tolerated    #2 acute blood loss anemia, gastrointestinal hemorrhage  - PRBCs as needed  - s/p EGD and IR interventions on 1/29 and 1/30   - trend hemoglobin   - on PPI    #3 bacteremia  - unclear etiology  - repeat cultures negative  - antibiotics per infectious diseases   - pending imaging of abdomen to identify course     #4 Encounter for palliative care  - continue to follow course  - patient is do not resuscitate and do not intubate   - will try to reach to sons to have further discussions as patient with very guarded overall prognosis and not going to be a candidate for anticoagulation or further cardiac workup

## 2023-02-01 NOTE — PROGRESS NOTE ADULT - TIME BILLING
coordinating care with MICU PA/resident, MICU RN, reviewing labs and prior records, personally reviewing and interpreting imaging, documenting findings and assessment in the medical record, counseling patient and family on current condition.
coordinating care with other consultants, MICU PA/resident, MICU RN, reviewing labs and prior records, personally reviewing and interpreting imaging, documenting findings and assessment in the medical record.
128

## 2023-02-02 NOTE — PROGRESS NOTE ADULT - ASSESSMENT
88y  Female with h/o TAVR, CAD, on ASA/plavix, HTN, DM, CKD. Patient was admitted on 1/28 with complaints of shortness of breath x 1 day and bloody bowel movements for the past several weeks. Patient was found to have H/H 6.7/22, Hypotensive. CXR suggestive of LLL pneumonia. Patient did not have evidence of active bleeding since admission to MICU. Started on IV vasopressor due to hypotension. Blood cultures are growing gram negative rods and gram positive cocci in pairs. 1/29 EGD remarkable for Duodenal ulcers s/p endoclip x 2. Patient continued to have multiple episodes of melena/hematochezia and taken to IR for coil embolization right gastroepiploic, SPDA, and GDA proper 1/30. Patient has been on Vancomycin and Zosyn since admission.        Klebsiella pneumoniae Bacteremia/Sepsis  Enterococcus faecalis  Bacteremia/sepsis  GI Bleed  Acute blood loss anemia   Leukocytosis   h/o TAVR         - Blood cultures 1/28 reporting Klebsiella pneumoniae, Enterococcus faecalis and 1 of 4 bottles with CoNS  - Repeat blood cultures 1/30 no growth   - CoNS likely contamination   - RVP/COVID 19 PCR 1/28 and 1/31 negative   - Check CT A/P  - TTE with no veg  - Given 1 of 4 bottles of Klebsiella pneumoniae, Enterococcus and rapid clearance of bacteremia, unlikely endocarditis although unable to rule this out completely.   - Procalcitonin level 0.46  - Continue Zosyn  - Follow up cultures  - Trend Fever  - Trend WBC      Will Follow    d/w MICU team, clinical pharmacy and Dr Campbell

## 2023-02-02 NOTE — PROGRESS NOTE ADULT - ASSESSMENT
87 y/o F with a h/o TAVR, CAD, on ASA/plavix, HTN, DM, CKD with:    # Undifferentiated shock  # New onset AF with RVR  # NSTEMI  # GIB (duodenal ulcer, s/p IR embo 1/30)  # Acute blood loss anemia  # UTI  # Lobar pneumonia   # Gram negative laura/GPC pairs bacteremia  # ANA CRISTINA    Resolving shock state which developed in the setting of NSTEMI and suspected hypovolemia. Now with signs of volume overload (pulmonary edema/effusions/SOB).    - weaned off phenylephrine infusion, BP remains soft, restart as necessary to maintain a MAP > 65  - dark tarry BM overnight, hopefully old blood in transit, repeat H/H stable  - trend CBC, continue to transfuse blood product for Hgb < 8 and/or symptomatic bleeding  - maintain PPI gtt  - restarted on IV diuresis, monitor fluid balance closely, currently 3.7L net positive overall  - start beta blockade when hemodynamics can tolerate  - trops still uptrending  - she is not a candidate for anticoagulation or antiplatelet agents at this time  - CT C/A/P ordered  - repeat blood cultures are negative for growth, continue empiric Zosyn, vancomycin discontinued  - ID input appreciated  - ANA CRISTINA secondary to ischemic ATN, optimize end-organ perfusion as above  - trend BUN/Cr, electrolytes, acid-base balance, and monitor UOP      Case discussed with MICU physician, Dr. Porter.

## 2023-02-02 NOTE — PROGRESS NOTE ADULT - NSPROGADDITIONALINFOA_GEN_ALL_CORE
Attending and PA/NP shared services statement (NON-critical care):     PA/NP to bill.     I independently performed the documented history, exam, and medical decision making.     Attending comments:   Patient successfully weaned off IV pressors, and transitioned to midodrine.  No further melena throughout the day.  Patient appears very edematous, however given the soft BP, lasix decreased from 80 mg IV BID to 40 mg IV qdaily.  Patient continues to report chest congestion, started mucinex PRN and mucomyst neb PRN.   If no further improvement, may have to consider increasing lasix again if BP tolerates.    Has GOC discussion carried out among Juan (one of the 5 sons, which is HCP) and palliative team.  Family would like to proceed with no escalation of care.  Meaning they agree with not pursing to restart IV vasopressor if she becomes hemodynamically unstable again.  No further heroic measure.  However, they do want her to get proper monitoring.  Endorsed to Juan that if patient remains stable throughout the day and night, she will be transferred to medical floor,

## 2023-02-02 NOTE — PROGRESS NOTE ADULT - SUBJECTIVE AND OBJECTIVE BOX
OVERNIGHT EVENTS: off pressors, afebrile, still with melena per nursing     Present Symptoms: per staff     Dyspnea: none   Nausea/Vomiting: No  Anxiety:  No  Depression: unable   Fatigue: unable   Loss of appetite: unable   Constipation: none     Pain: none             Character-            Duration-            Effect-            Factors-            Frequency-            Location-            Severity-    Pain AD Score:  [email or web address] (press ctrl + left click to view)    Review of Systems: Reviewed                    Unable to obtain due to poor mentation   All others negative    MEDICATIONS  (STANDING):  acetaminophen   IVPB .. 1000 milliGRAM(s) IV Intermittent once  albuterol/ipratropium for Nebulization 3 milliLiter(s) Nebulizer every 6 hours  chlorhexidine 2% Cloths 1 Application(s) Topical <User Schedule>  dextrose 50% Injectable 25 Gram(s) IV Push once  dextrose 50% Injectable 12.5 Gram(s) IV Push once  dextrose 50% Injectable 25 Gram(s) IV Push once  furosemide   Injectable 80 milliGRAM(s) IV Push two times a day  glucagon  Injectable 1 milliGRAM(s) IntraMuscular once  influenza  Vaccine (HIGH DOSE) 0.7 milliLiter(s) IntraMuscular once  insulin glargine Injectable (LANTUS) 20 Unit(s) SubCutaneous at bedtime  insulin lispro (ADMELOG) corrective regimen sliding scale   SubCutaneous every 6 hours  insulin lispro Injectable (ADMELOG) 2 Unit(s) SubCutaneous three times a day before meals  iron sucrose IVPB 100 milliGRAM(s) IV Intermittent every 24 hours  levothyroxine 125 MICROGram(s) Oral at bedtime  nystatin Powder 1 Application(s) Topical two times a day  pantoprazole Infusion 8 mG/Hr (10 mL/Hr) IV Continuous <Continuous>  phenylephrine    Infusion 0.5 MICROgram(s)/kG/Min (17.9 mL/Hr) IV Continuous <Continuous>  piperacillin/tazobactam IVPB.. 3.375 Gram(s) IV Intermittent every 12 hours  sertraline 50 milliGRAM(s) Oral daily  traZODone 50 milliGRAM(s) Oral at bedtime    MEDICATIONS  (PRN):  benzocaine/menthol Lozenge 1 Lozenge Oral every 4 hours PRN Sore Throat  melatonin 5 milliGRAM(s) Oral at bedtime PRN Sleep  ondansetron Injectable 4 milliGRAM(s) IV Push every 6 hours PRN Nausea and/or Vomiting  sodium chloride 0.9% lock flush 10 milliLiter(s) IV Push every 1 hour PRN Pre/post blood products, medications, blood draw, and to maintain line patency    PHYSICAL EXAM:    Vital Signs Last 24 Hrs  T(C): 35.9 (02 Feb 2023 07:15), Max: 36.6 (01 Feb 2023 08:00)  T(F): 96.6 (02 Feb 2023 07:15), Max: 97.9 (01 Feb 2023 08:00)  HR: 94 (02 Feb 2023 07:15) (81 - 106)  BP: 110/83 (02 Feb 2023 07:15) (89/45 - 145/83)  BP(mean): 94 (02 Feb 2023 07:15) (45 - 112)  RR: 24 (02 Feb 2023 07:15) (14 - 28)  SpO2: 100% (02 Feb 2023 07:15) (91% - 100%)    Parameters below as of 02 Feb 2023 07:00  Patient On (Oxygen Delivery Method): nasal cannula  O2 Flow (L/min): 2      General: lethargic                 HEENT: normal      Lungs: mild tachypnea    CV: normal      GI: normal      :  thomas    MSK: weakness     Skin: no rash    LABS:                        8.7    6.54  )-----------( 150      ( 02 Feb 2023 03:38 )             26.4     02-02    141  |  107  |  65.1<H>  ----------------------------<  85  3.6   |  23.0  |  2.62<H>    Ca    8.1<L>      02 Feb 2023 03:38  Phos  4.2     02-02  Mg     2.3     02-02    TPro  4.9<L>  /  Alb  2.8<L>  /  TBili  0.4  /  DBili  x   /  AST  52<H>  /  ALT  24  /  AlkPhos  60  02-01    I&O's Summary    01 Feb 2023 07:01  -  02 Feb 2023 07:00  --------------------------------------------------------  IN: 923.5 mL / OUT: 1472 mL / NET: -548.5 mL    02 Feb 2023 07:01  -  02 Feb 2023 07:42  --------------------------------------------------------  IN: 35 mL / OUT: 0 mL / NET: 35 mL    RADIOLOGY & ADDITIONAL STUDIES:    CT C/A/P pending     ADVANCE DIRECTIVES/TREATMENT PREFERENCES:  do not resuscitate and do not intubate

## 2023-02-02 NOTE — PROGRESS NOTE ADULT - ASSESSMENT
88F with CAD, aortic valve repair, HTN, DM, admitted with acute blood loss anemia/gi bleed s/p EGD/IR intervention, course complicated by bacteremia/septic shock, and new onset afib with RVR and NSTEMI.     #1 shock, mixed cardiogenic and ? septic, NSTEMI, Afib   - conservative measures  - not a candidate for anticoagulation due to recent gastrointestinal hemorrhage and thus not a candidate for further workup like cardiac cath  - off pressors     #2 acute blood loss anemia, gastrointestinal hemorrhage  - PRBCs as needed  - s/p EGD and IR interventions on 1/29 and 1/30   - trend hemoglobin   - on PPI    #3 bacteremia  - unclear etiology  - repeat cultures negative  - antibiotics per infectious diseases   - pending imaging of abdomen to help identify source    #4 Encounter for palliative care  - plan to meet with family today  - despite patient is stabilizing, her overall prognosis is poor given continued melena, NSTEMI, and inability to give anticoagulation. likely hospice eligible

## 2023-02-02 NOTE — PROGRESS NOTE ADULT - SUBJECTIVE AND OBJECTIVE BOX
Patient is a 88y old  Female who presents with a chief complaint of GIB (30 Jan 2023 14:56)      BRIEF HOSPITAL COURSE: 89 y/o F with a h/o TAVR, CAD, on ASA/plavix, HTN, DM, CKD, admitted on 1/28 with complaints of shortness of breath x 1 day and endorsed recurrent bloody bowel movements for the past several weeks. H/H 6.7/22. Hypotensive. CXR suggestive of LLL pneumonia. One set of blood cultures was polymicrobial (klebsiella, enterococcus, coag neg staph). Started on IV vasopressor. S/p EGD 1/29 revealing of 8mm D2 non-bleeding ulcer, 10mm D2 cratered ulcer with visible vessel s/p cautery, s/p Endoclip x 2. Continued episodes of bloody bowel movements s/p EGD. Subsequently went for IR embolization of R gastroepiploic, SPDA and GDA proper on 1/30. Developed NSTEMI, new onset AF with RVR, and shock requiring IV vasopressors on 1/31.    Events last 24 hours: Weaned off IV vasopressors, appeared to have responded to volume resuscitation. Had dark tarry BM overnight. H/H is stable. She is complaining of congestion, SOB, and mild left sided chest pressure.      PAST MEDICAL & SURGICAL HISTORY:  Hypertension      History of Hyperthyroidism      Hyperlipemia      Diabetes mellitus type II      S/P TKR (total knee replacement)  left knee      Carpal tunnel syndrome on both sides      History of cataract extraction  bilateral      H/O total hysterectomy      History of laminectomy        Allergies    No Known Allergies    Intolerances      FAMILY HISTORY:      Review of Systems:  CONSTITUTIONAL: No fever, chills, or fatigue  EYES: No eye pain, visual disturbances, or discharge  ENMT:  No difficulty hearing, tinnitus, vertigo; No sinus or throat pain  NECK: No pain or stiffness  RESPIRATORY: No cough, wheezing, chills or hemoptysis; (+) shortness of breath  CARDIOVASCULAR: (+)chest pressure, no palpitations, dizziness, or leg swelling  GASTROINTESTINAL: No abdominal or epigastric pain. No nausea, vomiting, or hematemesis; No diarrhea or constipation. No melena or hematochezia.  GENITOURINARY: No dysuria, frequency, hematuria, or incontinence  NEUROLOGICAL: No headaches, memory loss, loss of strength, numbness, or tremors  SKIN: No itching, burning, rashes, or lesions   MUSCULOSKELETAL: No joint pain or swelling; No muscle, back, or extremity pain  PSYCHIATRIC: No depression, anxiety, mood swings, or difficulty sleeping          Medications:  piperacillin/tazobactam IVPB.. 3.375 Gram(s) IV Intermittent every 12 hours  furosemide   Injectable 80 milliGRAM(s) IV Push two times a day  phenylephrine    Infusion 0.5 MICROgram(s)/kG/Min IV Continuous <Continuous>  albuterol/ipratropium for Nebulization 3 milliLiter(s) Nebulizer every 6 hours  acetaminophen   IVPB .. 1000 milliGRAM(s) IV Intermittent once  melatonin 5 milliGRAM(s) Oral at bedtime PRN  ondansetron Injectable 4 milliGRAM(s) IV Push every 6 hours PRN  sertraline 50 milliGRAM(s) Oral daily  traZODone 50 milliGRAM(s) Oral at bedtime  pantoprazole Infusion 8 mG/Hr IV Continuous <Continuous>  dextrose 50% Injectable 25 Gram(s) IV Push once  dextrose 50% Injectable 12.5 Gram(s) IV Push once  dextrose 50% Injectable 25 Gram(s) IV Push once  glucagon  Injectable 1 milliGRAM(s) IntraMuscular once  insulin glargine Injectable (LANTUS) 20 Unit(s) SubCutaneous at bedtime  insulin lispro (ADMELOG) corrective regimen sliding scale   SubCutaneous every 6 hours  insulin lispro Injectable (ADMELOG) 2 Unit(s) SubCutaneous three times a day before meals  levothyroxine 125 MICROGram(s) Oral at bedtime  iron sucrose IVPB 100 milliGRAM(s) IV Intermittent every 24 hours  sodium chloride 0.9% lock flush 10 milliLiter(s) IV Push every 1 hour PRN  influenza  Vaccine (HIGH DOSE) 0.7 milliLiter(s) IntraMuscular once  chlorhexidine 2% Cloths 1 Application(s) Topical <User Schedule>  nystatin Powder 1 Application(s) Topical two times a day            ICU Vital Signs Last 24 Hrs  T(C): 36.1 (02 Feb 2023 01:00), Max: 36.6 (01 Feb 2023 04:00)  T(F): 97 (02 Feb 2023 01:00), Max: 97.9 (01 Feb 2023 04:00)  HR: 87 (02 Feb 2023 01:00) (81 - 106)  BP: 94/53 (02 Feb 2023 01:00) (73/58 - 147/134)  BP(mean): 66 (02 Feb 2023 01:00) (45 - 140)  ABP: --  ABP(mean): --  RR: 18 (02 Feb 2023 01:00) (14 - 28)  SpO2: 96% (02 Feb 2023 01:00) (91% - 100%)    O2 Parameters below as of 01 Feb 2023 21:20  Patient On (Oxygen Delivery Method): nasal cannula,2L/M          Vital Signs Last 24 Hrs  T(C): 36.1 (02 Feb 2023 01:00), Max: 36.6 (01 Feb 2023 04:00)  T(F): 97 (02 Feb 2023 01:00), Max: 97.9 (01 Feb 2023 04:00)  HR: 87 (02 Feb 2023 01:00) (81 - 106)  BP: 94/53 (02 Feb 2023 01:00) (73/58 - 147/134)  BP(mean): 66 (02 Feb 2023 01:00) (45 - 140)  RR: 18 (02 Feb 2023 01:00) (14 - 28)  SpO2: 96% (02 Feb 2023 01:00) (91% - 100%)    Parameters below as of 01 Feb 2023 21:20  Patient On (Oxygen Delivery Method): nasal cannula,2L/M            I&O's Detail    31 Jan 2023 07:01  -  01 Feb 2023 07:00  --------------------------------------------------------  IN:    IV PiggyBack: 100 mL    IV PiggyBack: 150 mL    IV PiggyBack: 200 mL    Lactated Ringers Bolus: 2000 mL    Norepinephrine: 40 mL    Pantoprazole: 240 mL    Phenylephrine: 887.4 mL    PRBCs (Packed Red Blood Cells): 350 mL  Total IN: 3967.4 mL    OUT:    Blood Loss (mL): 2 mL    Indwelling Catheter - Urethral (mL): 2050 mL  Total OUT: 2052 mL    Total NET: 1915.4 mL      01 Feb 2023 07:01  -  02 Feb 2023 02:30  --------------------------------------------------------  IN:    IV PiggyBack: 100 mL    IV PiggyBack: 100 mL    IV PiggyBack: 50 mL    Pantoprazole: 40 mL    Pantoprazole: 140 mL    Phenylephrine: 283.5 mL  Total IN: 713.5 mL    OUT:    Indwelling Catheter - Urethral (mL): 1077 mL    Norepinephrine: 0 mL  Total OUT: 1077 mL    Total NET: -363.5 mL            LABS:                        9.1    7.61  )-----------( 159      ( 01 Feb 2023 22:25 )             27.5     02-01    143  |  107  |  67.6<H>  ----------------------------<  85  3.9   |  25.0  |  2.63<H>    Ca    8.2<L>      01 Feb 2023 13:52  Phos  4.3     02-01  Mg     2.4     02-01    TPro  4.9<L>  /  Alb  2.8<L>  /  TBili  0.4  /  DBili  x   /  AST  52<H>  /  ALT  24  /  AlkPhos  60  02-01      CARDIAC MARKERS ( 01 Feb 2023 13:52 )  x     / 0.76 ng/mL / x     / x     / x      CARDIAC MARKERS ( 01 Feb 2023 05:00 )  x     / 0.65 ng/mL / x     / x     / x      CARDIAC MARKERS ( 01 Feb 2023 00:04 )  x     / 0.48 ng/mL / x     / x     / x          CAPILLARY BLOOD GLUCOSE      POCT Blood Glucose.: 106 mg/dL (01 Feb 2023 23:24)        CULTURES:  Culture Results:   No growth to date. (01-31 @ 08:35)  Culture Results:   No growth to date. (01-31 @ 08:15)  Culture Results:   No growth to date. (01-30 @ 08:45)  Culture Results:   No growth to date. (01-30 @ 08:45)  Culture Results:   Growth in aerobic and anaerobic bottles: Klebsiella pneumoniae  Growth in aerobic and anaerobic bottles: Enterococcus faecalis  Growth in aerobic bottle: Coag Negative Staphylococcus Unable to Identify  further  Coag Negative Staphylococcus  Singleset isolate, possible contaminant. Contact  Microbiology if susceptibility testing clinically  indicated.  ***Blood Panel PCR results on this specimen are available  approximately 3 hours after the Gram stain result.***  Gram stain, PCR, and/or culture results may not always  correspond due to difference in methodologies.  ************************************************************  This PCR assay was performed by multiplex PCR. This  Assay tests for 66 bacterial and resistance gene targets.  Please refer to the [facility] test directory  at [email or web address] for details. (01-28 @ 11:15)  Culture Results:   No growth to date. (01-28 @ 10:08)        Physical Examination:    General: No acute distress.  Alert, oriented, interactive, nonfocal, fatigued    HEENT: Pupils equal, reactive to light.  Symmetric.    PULM: scattered rales/rhonci bilaterally, no significant sputum production    CVS: Regular rate and rhythm, no murmurs, rubs, or gallops    ABD: Soft, nondistended, nontender, normoactive bowel sounds, no masses    EXT: No edema, nontender    SKIN: Warm and well perfused, no rashes noted.    NEURO: A&Ox3, strength 5/5 all extremities, cranial nerves grossly intact, no focal deficits      RADIOLOGY:     < from: Xray Chest 1 View- PORTABLE-Urgent (Xray Chest 1 View- PORTABLE-Urgent .) (01.31.23 @ 09:22) >  Heart magnified by technique. However aortic valve again noted.    There is an increasing left base effusion now mild and is significant   increase right base infiltrate with pleural component.    Incidental azygous lobe.    IMPRESSION: Increasing bilateral lung and pleural findings.

## 2023-02-02 NOTE — PROGRESS NOTE ADULT - SUBJECTIVE AND OBJECTIVE BOX
[facility]  INFECTIOUS DISEASES at [place] and [place]  =======================================================                               Luis Villarreal MD#   Melba Marquis MD*                             Niki Carroll MD*   Kiana Kam MD*            Diplomates American Board of Internal Medicine & Infectious Diseases                # [place] Office - Appt - Tel  [phone] Fax [phone]                * [place] Office - Appt - Tel [phone] Fax [phone]                                  Hospital Consult line:  [phone]  =======================================================    TATE CHEN [id number]    Follow up: Bacteremia    On pressors  More lethargic today       Allergies:  No Known Allergies       REVIEW OF SYSTEMS:  Unable to obtain due to medical condition       Physical Exam:  GEN: lethargic   HEENT: normocephalic and atraumatic. EOMI. PERRL.    NECK: Supple.   LUNGS: Decreased basal BS B/L   HEART: RRR  ABDOMEN: Soft, NT, ND.  +BS.    : [place]   EXTREMITIES: trace edema.  MSK: No joint swelling  NEUROLOGIC: Wakes to stimuli   PSYCHIATRIC: not answering questions   SKIN: No rash      Vitals:  T(F): 96.8 (02 Feb 2023 08:00), Max: 97.7 (01 Feb 2023 10:00)  HR: 90 (02 Feb 2023 09:00)  BP: 105/63 (02 Feb 2023 09:00)  RR: 14 (02 Feb 2023 09:00)  SpO2: 99% (02 Feb 2023 09:00) (91% - 100%)  temp max in last 48H T(F): , Max: 98.7 (01-31-23 @ 21:00)    Current Antibiotics:  piperacillin/tazobactam IVPB.. 3.375 Gram(s) IV Intermittent every 12 hours    Other medications:  acetaminophen   IVPB .. 1000 milliGRAM(s) IV Intermittent once  albuterol/ipratropium for Nebulization 3 milliLiter(s) Nebulizer every 6 hours  albuterol/ipratropium for Nebulization. 3 milliLiter(s) Nebulizer once  chlorhexidine 2% Cloths 1 Application(s) Topical <User Schedule>  dextrose 50% Injectable 25 Gram(s) IV Push once  dextrose 50% Injectable 12.5 Gram(s) IV Push once  dextrose 50% Injectable 25 Gram(s) IV Push once  furosemide   Injectable 80 milliGRAM(s) IV Push two times a day  glucagon  Injectable 1 milliGRAM(s) IntraMuscular once  influenza  Vaccine (HIGH DOSE) 0.7 milliLiter(s) IntraMuscular once  insulin glargine Injectable (LANTUS) 20 Unit(s) SubCutaneous at bedtime  insulin lispro (ADMELOG) corrective regimen sliding scale   SubCutaneous every 6 hours  insulin lispro Injectable (ADMELOG) 2 Unit(s) SubCutaneous three times a day before meals  iron sucrose IVPB 100 milliGRAM(s) IV Intermittent every 24 hours  levothyroxine 125 MICROGram(s) Oral at bedtime  midodrine      midodrine 10 milliGRAM(s) Oral once  midodrine 10 milliGRAM(s) Oral three times a day  nystatin Powder 1 Application(s) Topical two times a day  pantoprazole Infusion 8 mG/Hr IV Continuous <Continuous>  phenylephrine    Infusion 0.5 MICROgram(s)/kG/Min IV Continuous <Continuous>  sertraline 50 milliGRAM(s) Oral daily  traZODone 50 milliGRAM(s) Oral at bedtime                            8.7    6.54  )-----------( 150      ( 02 Feb 2023 03:38 )             26.4     02-02    141  |  107  |  65.1<H>  ----------------------------<  85  3.6   |  23.0  |  2.62<H>    Ca    8.1<L>      02 Feb 2023 03:38  Phos  4.2     02-02  Mg     2.3     02-02    TPro  4.9<L>  /  Alb  2.8<L>  /  TBili  0.4  /  DBili  x   /  AST  52<H>  /  ALT  24  /  AlkPhos  60  02-01    RECENT CULTURES:  02-01 @ 17:20    RVP  NotDetec    01-31 @ 08:35 .Blood Blood-Peripheral     No growth to date.    01-31 @ 08:15 .Blood Blood-Peripheral     No growth to date.    01-30 @ 08:45 .Blood Blood     No growth to date.    01-28 @ 11:15 .Blood Blood-Peripheral Blood Culture PCR  Klebsiella pneumoniae  Enterococcus faecalis    Growth in aerobic and anaerobic bottles: Klebsiella pneumoniae  Growth in aerobic and anaerobic bottles: Enterococcus faecalis  Growth in aerobic bottle: Coag Negative Staphylococcus Unable to Identify  further Coag Negative Staphylococcus  Singleset isolate, possible contaminant. Contact  Microbiology if susceptibility testing clinically indicated.  ***Blood Panel PCR results on this specimen are available  approximately 3 hours after the Gram stain result.***  Gram stain, PCR, and/or culture results may not always  correspond due to difference in methodologies.  ************************************************************  This PCR assay was performed by multiplex PCR. This  Assay tests for 66 bacterial and resistance gene targets.  Please refer to the [facility] test directory  at [email or web address] for details.  Growth in aerobic bottle: Gram Negative Rods and Gram positive cocci in  pairs  Growth in anaerobic bottle: Gram Negative Rods and Gram positive cocci in  pairs    01-28 @ 10:08 .Blood Blood-Peripheral     No growth to date.      WBC Count: 6.54 K/uL (02-02-23 @ 03:38)  WBC Count: 7.61 K/uL (02-01-23 @ 22:25)  WBC Count: 7.71 K/uL (02-01-23 @ 13:52)  WBC Count: 12.35 K/uL (02-01-23 @ 05:00)  WBC Count: 8.53 K/uL (02-01-23 @ 00:04)  WBC Count: 13.66 K/uL (01-31-23 @ 04:00)  WBC Count: 13.23 K/uL (01-30-23 @ 21:29)  WBC Count: 9.25 K/uL (01-30-23 @ 16:56)  WBC Count: 10.38 K/uL (01-30-23 @ 03:40)  WBC Count: 10.78 K/uL (01-29-23 @ 21:30)  WBC Count: 10.19 K/uL (01-29-23 @ 18:11)  WBC Count: 15.40 K/uL (01-29-23 @ 10:16)  WBC Count: 10.92 K/uL (01-29-23 @ 04:11)  WBC Count: 11.16 K/uL (01-28-23 @ 19:57)  WBC Count: 8.79 K/uL (01-28-23 @ 10:08)    Creatinine, Serum: 2.62 mg/dL (02-02-23 @ 03:38)  Creatinine, Serum: 2.63 mg/dL (02-01-23 @ 13:52)  Creatinine, Serum: 2.53 mg/dL (02-01-23 @ 05:00)  Creatinine, Serum: 2.52 mg/dL (02-01-23 @ 00:04)  Creatinine, Serum: 2.33 mg/dL (01-31-23 @ 04:00)  Creatinine, Serum: 2.46 mg/dL (01-30-23 @ 03:40)  Creatinine, Serum: 2.47 mg/dL (01-29-23 @ 21:30)  Creatinine, Serum: 2.48 mg/dL (01-29-23 @ 17:00)  Creatinine, Serum: 2.35 mg/dL (01-29-23 @ 10:16)  Creatinine, Serum: 2.37 mg/dL (01-29-23 @ 04:11)  Creatinine, Serum: 2.25 mg/dL (01-28-23 @ 17:20)  Creatinine, Serum: 2.38 mg/dL (01-28-23 @ 10:08)    Procalcitonin, Serum: 0.46 ng/mL (01-29-23 @ 04:11)  Procalcitonin, Serum: 0.37 ng/mL (01-28-23 @ 21:28)     SARS-CoV-2: NotDetec (02-01-23 @ 17:20)  SARS-CoV-2: NotDetec (01-28-23 @ 10:08)        < from: Xray Chest 1 View- PORTABLE-Urgent (Xray Chest 1 View- PORTABLE-Urgent .) (01.31.23 @ 09:22) >    ACC: [id number] EXAM:  XR CHEST PORTABLE URGENT 1V   ORDERED BY: KOURTNEY CLIFFORD     PROCEDURE DATE:  01/31/2023      INTERPRETATION:  AP chest on January 31, 2023 at 9:11 AM. Patient has a   GI bleed and is short of breath.    Heart magnified by technique. However aortic valve again noted.    There is an increasing left base effusion now mild and is significant   increase right base infiltrate with pleural component.    Incidental azygous lobe.    IMPRESSION: Increasing bilateral lung and pleural findings.    --- End of Report ---  < end of copied text >      < from: TTE Echo Complete w/o Contrast w/ Doppler (01.31.23 @ 13:36) >  Summary:   1. Technically difficult study.   2. Left ventricular ejection fraction, by visual estimation, is 55 to 60%.   3. Spectral Doppler shows impaired relaxation pattern of left   ventricular myocardial filling (Grade I diastolic dysfunction).   4. There is mild concentric left ventricular hypertrophy.   5. Mild mitral valve regurgitation.   6. Moderate mitralannular calcification.   7. Thickening and calcification of the anterior and posterior mitral   valve leaflets.   8. Mild tricuspid regurgitation.   9. TAVR in the aortic position.  10. Peak aortic valve gradient is 9.9 mmHg and the mean gradient is 5.5   mmHg, which is probably normal in the setting of a prosthetic aortic valve.  11. Estimated pulmonary artery systolic pressure is 47.4 mmHg assuming a   right atrial pressure of 8 mmHg, which is consistent with mild pulmonary   hypertension.  12. Suboptimal study for evaluation of endocarditis, consider LISSA if   clinically indicated.    < end of copied text >

## 2023-02-02 NOTE — CHART NOTE - NSCHARTNOTEFT_GEN_A_CORE
Palliative care social work note.     and palliative care physician DR Dc met with patient and son Juan at bedside. Education regarding palliative care services and support offered. Patient was able to identify her symptom concerns and Juan as alternate HCP agent advocated with palliative care team to establish goals of care. Patients oldest son currently in [place] and is primary HCP. patient has 5 children all who are being updated regularly by  Juan. Patient has verbalized to son that she does not want to have to go thru aggressive measures again. Son in agreement to no escalation of care to pressors. Patient wants to continue with current management but in the event situation worsens to shift to comfort measures. Case discussed with ICU team .DORA on chart.

## 2023-02-02 NOTE — GOALS OF CARE CONVERSATION - ADVANCED CARE PLANNING - CONVERSATION DETAILS
Met with pt and alternate proxy son Juan along with Palliative [facility].     Pt was awake and alert, sitting in chair and able to answer simple questions. She is oob to chair, comfortable on nasal cannula, getting neb treatment. She defers to son to assist in medical decision making. Son acknowledged conversation with intensivist Dr Vargas shortly ago regarding mother's overall comorbidities, complex hospital course and overall very guarded prognosis. He is aware her clinical status is very fragile and that for the present time will need to take it a day at a time. However, brought up prior discussion of "no escalation of care" in the event mother should acutely decompensate including worsening anemia, he does not want her placed back on IV vasopressors, transfusions or any other aggressive interventions.     We also spoke about a comfort measure approach in the event should his mother  clinically worsen down the road. He feels that would likely be best course of plan at that juncture and request to be update of any changes for him to further discuss with his other 4 siblings.    Son brought up and affirmed advance directives for DNR/DNI. He also confirmed that his eldest brother Yao is primary proxy but is currently in [place] and that he is alternate proxy. He states all the siblings are in constant communication and make decisions together.    GOC at this time is to continue current level of medical management and no further escalation of care including no IV vasopressors and transfusions.    Emotional support provided and all questions answered.  Updated ICU team

## 2023-02-03 NOTE — CONSULT NOTE ADULT - PROBLEM SELECTOR RECOMMENDATION 9
-Made enzymes starting on 2/1 and peaked on second set. EKG at the time showed afib RVR with ST abnormality in V4-V6  -EKG from today with no ischemia. Showing NSR  -Echo with preserved EF, no WMA  -Has had episodes of intermittent "chest pressure" which comes on with her nausea symptoms and is describes as "burning". Endorses not eating more than a "couple of bites" for the past three days. Still with persistent nausea despite anti-nausea medication  -Had in depth discussion with Juan and patient at bedside.  Discussed further non invasive testing and stating if tests are abnormal the next step would be to pursue invasive testing.  At this time Juan and the patient told me they are unwilling to agree to such testing, use of contrast, anti platelet medications, or anesthesia / cardiac sedation. There main focus was to stop the persistent nausea and make her "feel better". "I want her comfortable"  -Medical management at this time  -Unable to resume DAPT for CAD hx. Refusing furthur PRBC transfusions  -GDMT: metoprolol, Consider starting statin if in agreeance with both HCP and patient      -Will sign off

## 2023-02-03 NOTE — CHART NOTE - NSCHARTNOTEFT_GEN_A_CORE
patient seen and eval.   patient  with hx of  AS s/p TAVR, CAD, DM, CKD  a/w GIB , acute blood loss anemia , s/p egd , found to have a duodonal ulcer , s/p  IR embolization. complicated with hemorrhagic shock , afib, nstemi, bacteremia   s/p multiple  units of blood products , ffp,  pressors, now off pressors   now downgraded to medicine for further management.   c/w consistent nausea , not much improvement with regaln , zofran   compazine added, gi follow up requested     -continue protonix 40 mg IV BID  -hold DAPT  -H. Pylori Ag negative  -continue midodrine and wean as tolerated  -right IJ to be removed   -patient does not want any further aggressive cardio  interventions  as per chart review   - add beta-blocker if rate is uncontrolled and BP permits  - Bacteremia possibly in the setting of Aspiration PNA  vs UTI   -blood cultures positive for Klebsiella and Enterococcus; also with CoNS which was likely a contaminant  -continue zosyn  - guarded prognosis   - palliative following patient seen and eval.   patient  with hx of  AS s/p TAVR, CAD, DM, CKD  a/w GIB , acute blood loss anemia , s/p egd , found to have a duodonal ulcer , s/p  IR embolization. complicated with hemorrhagic shock , afib, nstemi, bacteremia   s/p multiple  units of blood products , ffp,  pressors, now off pressors   now downgraded to medicine for further management.   c/w consistent nausea , not much improvement with regaln , zofran/ ? chest pressure    compazine added, gi follow up requested     -continue protonix 40 mg IV BID  -hold DAPT  -H. Pylori Ag negative  -continue midodrine and wean as tolerated  -right IJ to be removed   -patient does not want any further aggressive cardio  interventions  as per chart review   - add beta-blocker if rate is uncontrolled and BP permits  - Bacteremia possibly in the setting of Aspiration PNA  vs UTI   -blood cultures positive for Klebsiella and Enterococcus; also with CoNS which was likely a contaminant  -continue zosyn  - trend trops   - morphine prn for pain   - guarded prognosis   - palliative following patient seen and eval.   awake , alert x 2-3 , very weak , in mild distress due to continued nausea   mild epigastric tenderness on palpation   patient  with hx of  AS s/p TAVR, CAD, DM, CKD  a/w GIB , acute blood loss anemia , s/p egd , found to have a duodonal ulcer , s/p  IR embolization. complicated with hemorrhagic shock , afib, nstemi, bacteremia   s/p multiple  units of blood products , ffp,  pressors, now off pressors   now downgraded to medicine for further management.   c/w consistent nausea , not much improvement with regaln , zofran/ ? chest pressure    compazine added, gi follow up requested     -continue protonix 40 mg IV BID  -hold DAPT  -H. Pylori Ag negative  -continue midodrine and wean as tolerated  -right IJ to be removed   -patient does not want any further aggressive cardio  interventions  as per chart review   - add beta-blocker if rate is uncontrolled and BP permits  - Bacteremia possibly in the setting of Aspiration PNA  vs UTI   -blood cultures positive for Klebsiella and Enterococcus; also with CoNS which was likely a contaminant  -continue zosyn  - ct chest , abd pending   - trend trops   - morphine prn for pain   - guarded prognosis   - palliative following patient seen and eval.   awake , alert x 2-3 , very weak , in mild distress due to continued nausea   mild epigastric tenderness on palpation   patient  with hx of  AS s/p TAVR, CAD, DM, CKD  a/w GIB , acute blood loss anemia , s/p egd , found to have a duodonal ulcer , s/p  IR embolization. complicated with hemorrhagic shock , afib, nstemi, bacteremia   s/p multiple  units of blood products , ffp,  pressors, now off pressors   now downgraded to medicine for further management.   c/w consistent nausea , not much improvement with regaln , zofran/ ? chest pressure    compazine added, gi follow up requested     -continue protonix 40 mg IV BID  -unable to start DAPT due to gib   -continue midodrine and wean as tolerated  - trend trops   - morphine prn for pain   - add beta-blocker if rate is uncontrolled and BP permits  - trend trops , ekg , cardio consulted   - Bacteremia possibly in the setting of Aspiration PNA  vs UTI   -blood cultures positive for Klebsiella and Enterococcus; also with CoNS which was likely a contaminant  -continue zosyn  - ct chest , abd pending   - guarded prognosis   - palliative following

## 2023-02-03 NOTE — PROGRESS NOTE ADULT - NS ATTEND AMEND GEN_ALL_CORE FT
GI reconsulted for nausea, bleeding ulcers s/p endo therapy and IR embolization. Nausea with chest pressure since 6AM, rule out cardiac pathology. Continue current management, including antiemetics and PPI no role for further endoscopic evaluation. Appreciate palliative care evaluation
Patient seen and examined.  Patient with ongoing melena.  Reviewed the labs at length.  I examined the patient.  Discussed the condition at length with the ICU team, interventional radiology, patient son at the bedside.  At this time, it is imperative to have interventional radiology intervention as endoscopy may not be helpful.  Surgical consult.  Continue PPI infusion.  NPO.  Transfuse.  IV albumin.  GI will follow.
89 y/o F with a h/o TAVR, CAD, on ASA/ Plavix, HTN, DM, CKD admitted with Acute blood loss anemia, septic shock, bacteremia. IR embo yesterday, no recent need for blood - 2 U 1/30 last time, Hb 9.5 from 9.9  source likely duodenal sweep ulcer - ASA likely culprit  Cards perhaps to reassess need for dual anticoagulation in this 89 yo lady with a massive GIB  PPI BID  GI outpatient f/u as needed  Tolerating regular diet

## 2023-02-03 NOTE — PHARMACOTHERAPY INTERVENTION NOTE - COMMENTS
Blood cultures growing E faecalis, Klebsiella and CoNS. CoNS likely contaminant, repeat blood cultures negative. Discussed with ID can discontinue vancomycin, continue Zosyn for E faecalis/Klebsiella.
total 2 weeks from 1/30 end date 2/12/23
vanco level 10 this morning vanco changed to 1 g daily with another level in am.  pt not at steady state yet
level this am 17.5 in [facility]STINA hold dose today decrease dose to 750 mg daily starting tomorrow
urine legionella ag neg

## 2023-02-03 NOTE — PHARMACOTHERAPY INTERVENTION NOTE - NSPHARMCOMMASP
ASP - De-escalation
ASP - De-escalation
ASP - Duration of therapy
ASP - Renal dose adjustment
ASP - Renal dose adjustment

## 2023-02-03 NOTE — CHART NOTE - NSCHARTNOTEFT_GEN_A_CORE
Palliative care social work note.     and palliative care physician Dr Dc contacted son Juan to provide clinical updates and continue to establish goals of care based upon new medical information. Clinical updates provided. Bart Martinez reports that he would like issues to be worked up but no invasive testing, blood transfusions or pressors for heart. Son in agreement that if patient condition worsens ongoing discussions would be had to discuss options for comfort care. Support offered.

## 2023-02-03 NOTE — CONSULT NOTE ADULT - ASSESSMENT
Patient is a 88 year old female with history of AS s/p TAVR, CAD, DM, CKD who presented with GI bleed due to a duodonal ulcer and underwent EGD along with IR embolization. Patient requiring several units of blood products and Hb stabilize. Hospital course complicated by shock, new onset Afib, NSTEMI and polymicrobial bacteremia. Patient weaned off pressors and downgraded to medicine for further management.     1. Gastrointestinal Hemorrhage due to Duodenal Ulcer  -Hb stabilized s/p multiple units of PRBCs, 1 unit of platelets and FFP  -s/p EGD with cautery and endoclip on 1/29  -s/p IR coil embolization on 1/30  -continue protonix 40 mg IV BID  -continue venofer  -continue to hold DAPT  -no further melena reported tonight  -patient has declined any further transfusions if indicated  -GI recs reviewed; no further intervention  -Palliative care recs appreciated    2. Shock possibly due to Hypovolemia vs Sepsis vs Cardiogenic  -BP stabilized off pressors  -continue midodrine and wean as tolerated  -right IJ to be removed   -TTE reviewed; preserved EF and no WMA  -no further vasopressors per GOC discussion on 2/2    3. NSTEMI   -history of CAD  -continue to hold DAPT as above  -TNI peaked at 0.7 and downtrending to 0.6  -cardiology not consulted since patient does not want any further aggressive interventions     4. New Onset Atrial Fibrillation  -not a candidate for AC  -continue      DVT ppx - SCDs Patient is a 88 year old female with history of AS s/p TAVR, CAD, DM, CKD who presented with GI bleed due to a duodonal ulcer and underwent EGD along with IR embolization. Patient requiring several units of blood products and Hb stabilize. Hospital course complicated by shock, new onset Afib, NSTEMI and polymicrobial bacteremia. Patient weaned off pressors and downgraded to medicine for further management.     1. Gastrointestinal Hemorrhage due to Duodenal Ulcer  -Hb stabilized s/p multiple units of PRBCs, 1 unit of platelets and FFP  -s/p EGD with cautery and endoclip on 1/29  -s/p IR coil embolization on 1/30  -continue protonix 40 mg IV BID  -continue venofer  -continue to hold DAPT  -no further melena reported tonight  -patient has declined any further transfusions if indicated  -GI recs reviewed; no further intervention  -Palliative care recs appreciated    2. Shock possibly due to Hypovolemia vs Sepsis vs Cardiogenic  -BP stabilized off pressors  -continue midodrine and wean as tolerated  -right IJ to be removed   -TTE reviewed; preserved EF and no WMA  -no further vasopressors per GOC discussion on 2/2    3. NSTEMI   -history of CAD with intermittent chest pressure   -continue to hold DAPT as above  -TNI peaked at 0.7 and downtrending to 0.6  -cardiology not consulted since patient does not want any further aggressive interventions     4. New Onset Atrial Fibrillation  -not a candidate for AC  -will add beta-blocker if rate controlled in required and BP permits    5. Polymicrobial Bacteremia possibly in the setting of LLL PNA ?  -blood cultures positive for Klebsiella and Enterococcus; also with CoNS which was likely a contaminant  -repeat blood cultures negative  -CXR reviewed with RLL infiltrate and Left basilar effusion  -continue empiric zosyn  -MBS ordered  -continue mucinex and mucomyst as ordered by MICU team  -CT chest pending  -CT abd/pelvis to exclude any intra-abdominal source of infection  -TTE negative for vegetation; LISSA deferred since patient does not want any further aggressive measures  -ID on board; f/u further recs pending CT scans    6. Acute on Chronic HFpEF  -grossly volume overloaded  -Lasix changed from 80 mg IV BID to 40 mg IV daily due to hypotension  -previous CXR reviewed  -CT chest pending  -If BP remains stable, will increase diuretics   -strict I/os, daily weights  -TTE as above    7. Anxiety/Depression  -continue zoloft and trazadone    8. 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 DM  -Hba1c noted; sugars reviewed  -continue lantus and ISS  -change to ADA diet      DVT ppx - SCDs Patient is a 88 year old female with history of AS s/p TAVR, CAD, DM, CKD who presented with GI bleed due to a duodonal ulcer and underwent EGD along with IR embolization. Patient requiring several units of blood products and Hb stabilize. Hospital course complicated by shock, new onset Afib, NSTEMI and polymicrobial bacteremia. Patient weaned off pressors and downgraded to medicine for further management.     1. Gastrointestinal Hemorrhage due to Duodenal Ulcer   -Hb stabilized s/p multiple units of PRBCs, 1 unit of platelets and FFP  -s/p EGD with cautery and endoclip on 1/29  -s/p IR coil embolization on 1/30  -continue protonix 40 mg IV BID  -continue venofer  -continue to hold DAPT  -no further melena reported tonight  -H. Pylori Ag negative  -patient has declined any further transfusions if indicated  -GI recs reviewed; no further intervention  -Palliative care recs appreciated    2. Shock possibly due to Hypovolemia vs Sepsis vs Cardiogenic  -BP stabilized off pressors  -continue midodrine and wean as tolerated  -right IJ to be removed   -TTE reviewed; preserved EF and no WMA  -no further vasopressors per GOC discussion on 2/2    3. NSTEMI   -history of CAD with intermittent chest pressure   -continue to hold DAPT as above  -TNI peaked at 0.7 and downtrending to 0.6  -cardiology not consulted since patient does not want any further aggressive interventions     4. New Onset Atrial Fibrillation  -not a candidate for AC  -will add beta-blocker if rate is uncontrolled and BP permits    5. Polymicrobial Bacteremia possibly in the setting of Aspiration PNA  vs UTI ?  -blood cultures positive for Klebsiella and Enterococcus; also with CoNS which was likely a contaminant  -repeat blood cultures negative  -CXR reviewed with RLL infiltrate and Left basilar effusion  -RVP/COVID negative  -urine legionella and strep pneumo negative  -procal minimally elevated  -continue empiric zosyn  -MBS ordered  -continue mucinex and mucomyst as ordered by MICU team  -CT chest pending  -UA positive, but no UCx obtained. Low yield at this time since patient is already on antibiotic.  -CT abd/pelvis to exclude any intra-abdominal source of infection  -TTE negative for vegetation; LISSA deferred since patient does not want any further aggressive measures  -ID on board; f/u further recs pending CT scans    6. Acute on Chronic HFpEF  -grossly volume overloaded  -Lasix changed from 80 mg IV BID to 40 mg IV daily due to hypotension  -previous CXR reviewed  -CT chest pending  -If BP remains stable, will increase diuretics   -strict I/os, daily weights  -TTE as above    7. Anxiety/Depression  -continue zoloft and trazadone    8. Hypothyroidism  -continue synthroid    9. CKD Stage 3/4  -baseline creatinine 2.6 in Sept 2022  -creatinine essentially at baseline  -urine studies reviewed  -avoid nephrotoxic agents and renally dose medications  -monitor I/Os, daily weights  -nephrology recs reviewed; signed off    10. DM  -Hba1c 6.0; sugars uncontrolled  -continue lantus and ISS at current doses  -change to ADA diet    DVT ppx - SCDs

## 2023-02-03 NOTE — CONSULT NOTE ADULT - NS ATTEND AMEND GEN_ALL_CORE FT
Patient seen and examined by me personally. Briefly this is a 88y year old Female with relevant history of CAD, HTN, severe AS s/p TAVR, DMII presenting to the hospital with shortness of breath and bloody bowel movements. Underwent EGD with endoclip but shortly thereafter had AFRVR and NSTEMI. Discussed options with patient and family. At this time they do not want to pursue anything invasive---including cardiac catheterization. Conservative management currently complicated by GI Bleed. Unable to give dual antiplatelet therapy safely at this time.       Recommendations:   Continue beta blocker  Continue statin  Can consider plavix and aspirin DAPT when stable to do so from a GI perspective      Cardiology to sign off. Please call with questions.    Jeremie Gonsales MD  Interventional and Structural Cardiology  [phone]

## 2023-02-03 NOTE — PROGRESS NOTE ADULT - SUBJECTIVE AND OBJECTIVE BOX
[facility] PALLIATIVE MEDICINE     CC: FOLLOW UP VISIT + GOC    INTERVAL HPI/OVERNIGHT EVENTS:  Source if other than patient:  []Family   [x]Team           PRESENT SYMPTOMS:     Dyspnea: Mild Moderate Severe  Nausea/Vomiting: Yes No  Anxiety:  Yes No  Depression: Yes No  Fatigue: Yes No  Loss of appetite: Yes No  Constipation: Yes No    Pain:             Character-            Duration-            Effect-            Factors-            Frequency-            Location-            Severity-    Pain AD Score:  [email or web address]/[email or web address] (press ctrl + left click to view)    Review of Systems: Reviewed                     Negative:                     Positive:  All others negative    Unable to obtain due to poor mentation/encephalopathy     MEDICATIONS  (STANDING):  albuterol/ipratropium for Nebulization 3 milliLiter(s) Nebulizer every 6 hours  dextrose 50% Injectable 25 Gram(s) IV Push once  dextrose 50% Injectable 12.5 Gram(s) IV Push once  dextrose 50% Injectable 25 Gram(s) IV Push once  furosemide   Injectable 40 milliGRAM(s) IV Push every 24 hours  glucagon  Injectable 1 milliGRAM(s) IntraMuscular once  influenza  Vaccine (HIGH DOSE) 0.7 milliLiter(s) IntraMuscular once  insulin glargine Injectable (LANTUS) 15 Unit(s) SubCutaneous at bedtime  insulin lispro (ADMELOG) corrective regimen sliding scale   SubCutaneous every 6 hours  iron sucrose IVPB 100 milliGRAM(s) IV Intermittent every 24 hours  levothyroxine 125 MICROGram(s) Oral at bedtime  midodrine      midodrine 10 milliGRAM(s) Oral three times a day  nystatin Powder 1 Application(s) Topical two times a day  pantoprazole  Injectable 40 milliGRAM(s) IV Push two times a day  piperacillin/tazobactam IVPB.. 3.375 Gram(s) IV Intermittent every 12 hours  sertraline 50 milliGRAM(s) Oral daily  traZODone 50 milliGRAM(s) Oral at bedtime    MEDICATIONS  (PRN):  acetylcysteine 20%  Inhalation 4 milliLiter(s) Inhalation two times a day PRN chest congestion  benzocaine/menthol Lozenge 1 Lozenge Oral every 4 hours PRN Sore Throat  benzonatate 100 milliGRAM(s) Oral three times a day PRN Cough  bismuth subsalicylate Liquid 5 milliLiter(s) Oral every 8 hours PRN dyspepsia  guaiFENesin  milliGRAM(s) Oral every 12 hours PRN Cough  melatonin 5 milliGRAM(s) Oral at bedtime PRN Sleep  ondansetron Injectable 4 milliGRAM(s) IV Push every 6 hours PRN Nausea and/or Vomiting  sodium chloride 0.9% lock flush 10 milliLiter(s) IV Push every 1 hour PRN Pre/post blood products, medications, blood draw, and to maintain line patency      PHYSICAL EXAM:    Vital Signs Last 24 Hrs  T(C): 36.2 (03 Feb 2023 08:03), Max: 36.6 (02 Feb 2023 12:00)  T(F): 97.1 (03 Feb 2023 08:03), Max: 97.9 (02 Feb 2023 12:00)  HR: 83 (03 Feb 2023 10:32) (83 - 110)  BP: 109/63 (03 Feb 2023 10:32) (85/65 - 116/98)  BP(mean): 75 (03 Feb 2023 10:32) (66 - 106)  RR: 14 (03 Feb 2023 10:32) (12 - 27)  SpO2: 95% (03 Feb 2023 10:32) (94% - 100%)    Parameters below as of 03 Feb 2023 08:53  Patient On (Oxygen Delivery Method): room air           Karnofsky:  %    GEN: resting comfortably and no acute distress    HEENT: mucous membrane moist dry    Lungs: comfortable, nonlabored tachypnea /labored breathing  excessive secretions    CV: +s1/s2 regular rate and rhythm  tachycardia    GI: +BS, abdomen soft, nontender, nondistended distended  tender  no BS               PEG/NG/OG tube  constipation  last BM:     : normal  incontinent  oliguria/anuria  thomas    MSK: moves all 4 extremities, no cyanosis or edema weakness  edema             ambulatory  bedbound/wheelchair bound    NEURO: nonfocal. awake and alert, oriented x ____      Skin: warm and dry.  pressure ulcers- Stage_____  no rash    Psych: normal affect, appropriate behavior    LABS:                          8.4    6.30  )-----------( 165      ( 03 Feb 2023 02:55 )             25.5     02-03    146<H>  |  111<H>  |  59.7<H>  ----------------------------<  143<H>  3.6   |  25.0  |  2.68<H>    Ca    8.4      03 Feb 2023 02:55  Phos  4.1     02-03  Mg     2.4     02-03    TPro  4.8<L>  /  Alb  2.8<L>  /  TBili  0.2<L>  /  DBili  x   /  AST  18  /  ALT  17  /  AlkPhos  56  02-03        I&O's Summary    02 Feb 2023 07:01  -  03 Feb 2023 07:00  --------------------------------------------------------  IN: 315 mL / OUT: 970 mL / NET: -655 mL        RADIOLOGY & ADDITIONAL STUDIES: Reviewed     ADVANCE DIRECTIVES/TREATMENT PREFERENCES:  DNR YES NO  Completed on:                     MOLST  YES NO   Completed on:  Living Will  YES NO   Completed on:    NEUROLOGICAL MEDICATIONS/OPIOIDS/BENZODIAZEPINE IN PAST 24 HOURS    melatonin   5 milliGRAM(s) Oral (02-02-23 @ 22:23)    metoclopramide Injectable   5 milliGRAM(s) IV Push (02-03-23 @ 09:41)    ondansetron Injectable   4 milliGRAM(s) IV Push (02-03-23 @ 07:57)    sertraline   50 milliGRAM(s) Oral (02-02-23 @ 12:55)    traZODone   50 milliGRAM(s) Oral (02-02-23 @ 22:22)     [facility] PALLIATIVE MEDICINE     CC: FOLLOW UP VISIT + GOC    INTERVAL HPI/OVERNIGHT EVENTS:  Source if other than patient:  []Family   [x]Team     Downgraded out of ICU, awaiting SDU bed  No acute events overnight.   Seen at bedside. Pt awake, complaining of nausea.       PRESENT SYMPTOMS:     Dyspnea: no  Nausea/Vomiting:  No  Anxiety:   No  Depression: Yes due to situation  Fatigue: Yes   Loss of appetite: Yes    Constipation:  No, per RN overnight BM dark stool x1    Pain: No             Character-            Duration-            Effect-            Factors-            Frequency-            Location-            Severity-    Pain AD Score:  http://geriatrictoolkit.[email or web address]/painad.pdf (press ctrl + left click to view)    Review of Systems: Reviewed                     Negative: no chest pain or dyspnea at rest                     Positive: persistent nausea  All others negative    MEDICATIONS  (STANDING):  albuterol/ipratropium for Nebulization 3 milliLiter(s) Nebulizer every 6 hours  dextrose 50% Injectable 25 Gram(s) IV Push once  dextrose 50% Injectable 12.5 Gram(s) IV Push once  dextrose 50% Injectable 25 Gram(s) IV Push once  furosemide   Injectable 40 milliGRAM(s) IV Push every 24 hours  glucagon  Injectable 1 milliGRAM(s) IntraMuscular once  influenza  Vaccine (HIGH DOSE) 0.7 milliLiter(s) IntraMuscular once  insulin glargine Injectable (LANTUS) 15 Unit(s) SubCutaneous at bedtime  insulin lispro (ADMELOG) corrective regimen sliding scale   SubCutaneous every 6 hours  iron sucrose IVPB 100 milliGRAM(s) IV Intermittent every 24 hours  levothyroxine 125 MICROGram(s) Oral at bedtime  midodrine      midodrine 10 milliGRAM(s) Oral three times a day  nystatin Powder 1 Application(s) Topical two times a day  pantoprazole  Injectable 40 milliGRAM(s) IV Push two times a day  piperacillin/tazobactam IVPB.. 3.375 Gram(s) IV Intermittent every 12 hours  sertraline 50 milliGRAM(s) Oral daily  traZODone 50 milliGRAM(s) Oral at bedtime    MEDICATIONS  (PRN):  acetylcysteine 20%  Inhalation 4 milliLiter(s) Inhalation two times a day PRN chest congestion  benzocaine/menthol Lozenge 1 Lozenge Oral every 4 hours PRN Sore Throat  benzonatate 100 milliGRAM(s) Oral three times a day PRN Cough  bismuth subsalicylate Liquid 5 milliLiter(s) Oral every 8 hours PRN dyspepsia  guaiFENesin  milliGRAM(s) Oral every 12 hours PRN Cough  melatonin 5 milliGRAM(s) Oral at bedtime PRN Sleep  ondansetron Injectable 4 milliGRAM(s) IV Push every 6 hours PRN Nausea and/or Vomiting  sodium chloride 0.9% lock flush 10 milliLiter(s) IV Push every 1 hour PRN Pre/post blood products, medications, blood draw, and to maintain line patency      PHYSICAL EXAM:    Vital Signs Last 24 Hrs  T(C): 36.2 (03 Feb 2023 08:03), Max: 36.6 (02 Feb 2023 12:00)  T(F): 97.1 (03 Feb 2023 08:03), Max: 97.9 (02 Feb 2023 12:00)  HR: 83 (03 Feb 2023 10:32) (83 - 110)  BP: 109/63 (03 Feb 2023 10:32) (85/65 - 116/98)  BP(mean): 75 (03 Feb 2023 10:32) (66 - 106)  RR: 14 (03 Feb 2023 10:32) (12 - 27)  SpO2: 95% (03 Feb 2023 10:32) (94% - 100%)    Parameters below as of 03 Feb 2023 08:53  Patient On (Oxygen Delivery Method): room air    Karnofsky:  40%    GEN: frail. resting comfortably and no acute distress    HEENT: mucous membrane moist      Lungs: comfortable, nonlabored     CV: +s1/s2 RRR    GI: +BS, abdomen soft, NTND    : thomas    MSK:  no cyanosis or edema      NEURO: nonfocal. awake and alert, interactive    Skin: warm and dry.       LABS:                          8.4    6.30  )-----------( 165      ( 03 Feb 2023 02:55 )             25.5     02-03    146<H>  |  111<H>  |  59.7<H>  ----------------------------<  143<H>  3.6   |  25.0  |  2.68<H>    Ca    8.4      03 Feb 2023 02:55  Phos  4.1     02-03  Mg     2.4     02-03    TPro  4.8<L>  /  Alb  2.8<L>  /  TBili  0.2<L>  /  DBili  x   /  AST  18  /  ALT  17  /  AlkPhos  56  02-03        I&O's Summary    02 Feb 2023 07:01  -  03 Feb 2023 07:00  --------------------------------------------------------  IN: 315 mL / OUT: 970 mL / NET: -655 mL    RADIOLOGY & ADDITIONAL STUDIES: Reviewed     ADVANCE DIRECTIVES/TREATMENT PREFERENCES: DNR/DNI MOLST in place    NEUROLOGICAL MEDICATIONS/OPIOIDS/BENZODIAZEPINE IN PAST 24 HOURS    melatonin   5 milliGRAM(s) Oral (02-02-23 @ 22:23)    metoclopramide Injectable   5 milliGRAM(s) IV Push (02-03-23 @ 09:41)    ondansetron Injectable   4 milliGRAM(s) IV Push (02-03-23 @ 07:57)    sertraline   50 milliGRAM(s) Oral (02-02-23 @ 12:55)    traZODone   50 milliGRAM(s) Oral (02-02-23 @ 22:22)

## 2023-02-03 NOTE — CONSULT NOTE ADULT - SUBJECTIVE AND OBJECTIVE BOX
[facility]                                              CARDIOLOGY AT [place] [address]                                             Telephone: [phone]. Fax:[phone]                                                       CARDIOLOGY CONSULTATION NOTE                                                                                             History obtained by: Patient and medical record  Community Cardiologist: Out in pathogue, not able to recall   obtained: Yes [  ] No [ x ]  Reason for Consultation: NSTEMI  Available out pt records reviewed: Yes [ x ] No [  ]    HPI:  Patient is a 88y old  Female PMH TAVR, CAD , HTN, and DM who presented to the ED with complaints of shortness of breath and several weeks of bloody bowel movements. Chest xray shows LLL PNA. Underwent an EGD with cautery and endoclip for a duodenal ulcer on . Course complicated by new onset Afib with RVR and NSTEMI, persistent nausea. Has made trops which have peaked.  EKG from today with no ischemia. Has had episodes of intermittent "chest pressure" which comes on with her nausea symptoms and is describes as "burning". Had in depth discussion with Juan and patient at bedside.  Discussed further non invasive testing and stating if tests are abnormal the next step would be to pursue invasive testing.  At this time Juan and the patient told me they are unwilling to agree to such testing, use of contrast, anti platelet medications, or anesthesia / cardiac sedation. There main focus was to stop the persistent nausea and make her "feel better". "I want her comfortable"          CARDIAC TESTING   ECHO:  < from: TTE Echo Complete w/o Contrast w/ Doppler (23 @ 13:36) >  PHYSICIAN INTERPRETATION:  Left Ventricle: The left ventricular internal cavity size is normal.  Left ventricular ejection fraction, by visual estimation, is 55 to 60%.   Spectral Doppler shows impaired relaxation pattern of left ventricular   myocardial filling (Grade I diastolic dysfunction). Poor endocardial   border definition precludes reliable assessment of regional wall motion   abnormalities.  Right Ventricle: The right ventricle was not well visualized. Grossly   normal RV systolic function.  Left Atrium: The left atrium was not well visualized.  Right Atrium: The right atrium was not well visualized.  Pericardium: There is no evidence of pericardial effusion.  Mitral Valve: The mitral valve is not well seen. Thickening and   calcification of the anterior and posterior mitral valve leaflets. There   is moderate mitral annular calcification. Mild mitral valve regurgitation   is seen.  Tricuspid Valve: The tricuspid valve is not well seen. Mild tricuspid   regurgitation is visualized. Estimated pulmonary artery systolic pressure   is 47.4 mmHg assuming a right atrial pressure of 8 mmHg, which is   consistent with mild pulmonary hypertension.  Aortic Valve: The aortic valve was not well visualized. Peak aortic valve   gradient is 9.9 mmHg and the mean gradient is 5.5 mmHg, which is probably   normal in the setting of a prosthetic aortic valve. Trivial aortic valve   regurgitation is seen.  Pulmonic Valve: The pulmonic valve was not well visualized. No indication   of pulmonic valve regurgitation.  Pulmonary Artery: The main pulmonary artery is normal in size.  Venous: The inferior vena cava was normal sized, with respiratory size   variation less than 50%.  In comparison to the previous echocardiogram(s): There are no prior   studies on this patient for comparison purposes.      < end of copied text >    STRESS:    CATH:     ELECTROPHYSIOLOGY:     PAST MEDICAL HISTORY  Hypertension    History of Hyperthyroidism    Hyperlipemia    Diabetes mellitus type II        PAST SURGICAL HISTORY  S/P TKR (total knee replacement)    Carpal tunnel syndrome on both sides    History of cataract extraction    H/O total hysterectomy    History of laminectomy        SOCIAL HISTORY:  Denies smoking/alcohol/drugs  CIGARETTES:     ALCOHOL:  DRUGS:    FAMILY HISTORY:    Family History of Cardiovascular Disease:  Yes [  ] No [ x ]  Coronary Artery Disease in first degree relative: Yes [  ] No [ x ]  Sudden Cardiac Death in First degree relative: Yes [  ] No [ x ]    HOME MEDICATIONS:  Acetaminophen Extra Strength Gelcaps 500 m tab(s) orally every 8 hours (2023 15:34)  aspirin 81 mg oral tablet: 81 milligram(s) orally once a day (2023 15:34)  calcitriol 0.25 mcg oral capsule: 1 cap(s) orally once a day (2023 15:34)  Cinnamon 500 mg oral capsule: 2 cap(s) orally once a day (2023 15:34)  Cranberry oral capsule: 1 tab(s) orally once a day (2023 15:34)  Desitin Rapid Relief 13% topical cream: Apply topically to affected area 6 times a day, As Needed (2023 15:34)  fenofibrate 48 mg oral tablet: 1 tab(s) orally once a day (2023 15:34)  Fish Oil 1000 mg oral capsule: 1 cap(s) orally once a day (2023 15:34)  hydrOXYzine hydrochloride 50 mg oral tablet: 50  orally once a day (2023 15:34)  ICaps AREDS oral capsule:  (2023 15:34)  lactulose 10 g/15 mL oral solution: 10 gram(s) orally once a day, As Needed (2023 15:34)  levothyroxine 125 mcg (0.125 mg) oral capsule: 1 cap(s) orally once a day (2023 15:34)  metoprolol tartrate 25 mg oral tablet: 1 tab(s) orally once a day (2023 15:34)  NovoLOG FlexPen 100 units/mL injectable solution: as per sliding scale; up to 60u total during the day  (2023 15:34)  Plavix 75 mg oral tablet: 1 tab(s) orally once a day (2023 15:34)  pravastatin 20 mg oral tablet: 1 tab(s) orally once a day (2023 15:34)  Probiotic Formula (Bacillus Coagulans) oral capsule: 1 cap(s) orally once a day (2023 15:34)  sertraline 50 mg oral tablet: 1 tab(s) orally once a day (2023 15:34)  traZODone 150 mg oral tablet: 150 milligram(s) orally once a day (at bedtime) (2023 15:34)      CURRENT CARDIAC MEDICATIONS:  furosemide   Injectable 40 milliGRAM(s) IV Push every 24 hours  metoprolol tartrate 12.5 milliGRAM(s) Oral two times a day  midodrine      midodrine 10 milliGRAM(s) Oral three times a day      CURRENT OTHER MEDICATIONS:  acetylcysteine 20%  Inhalation 4 milliLiter(s) Inhalation two times a day PRN chest congestion  albuterol/ipratropium for Nebulization 3 milliLiter(s) Nebulizer every 6 hours  benzonatate 100 milliGRAM(s) Oral three times a day PRN Cough  guaiFENesin  milliGRAM(s) Oral every 12 hours PRN Cough  melatonin 5 milliGRAM(s) Oral at bedtime PRN Sleep  morphine  - Injectable 1 milliGRAM(s) IV Push every 4 hours PRN pain  ondansetron Injectable 4 milliGRAM(s) IV Push every 6 hours PRN Nausea and/or Vomiting  prochlorperazine   Injectable 10 milliGRAM(s) IntraMuscular every 8 hours  sertraline 50 milliGRAM(s) Oral daily  traZODone 50 milliGRAM(s) Oral at bedtime  bismuth subsalicylate Liquid 5 milliLiter(s) Oral every 8 hours PRN dyspepsia  pantoprazole  Injectable 40 milliGRAM(s) IV Push two times a day  benzocaine/menthol Lozenge 1 Lozenge Oral every 4 hours PRN Sore Throat  dextrose 50% Injectable 25 Gram(s) IV Push once, Stop order after: 1 Doses  dextrose 50% Injectable 12.5 Gram(s) IV Push once, Stop order after: 1 Doses  dextrose 50% Injectable 25 Gram(s) IV Push once, Stop order after: 1 Doses  glucagon  Injectable 1 milliGRAM(s) IntraMuscular once, Stop order after: 1 Doses  influenza  Vaccine (HIGH DOSE) 0.7 milliLiter(s) IntraMuscular once  insulin glargine Injectable (LANTUS) 15 Unit(s) SubCutaneous at bedtime  insulin lispro (ADMELOG) corrective regimen sliding scale   SubCutaneous every 6 hours  iron sucrose IVPB 100 milliGRAM(s) IV Intermittent every 24 hours, Stop order after: 5 Days  levothyroxine 125 MICROGram(s) Oral at bedtime  nystatin Powder 1 Application(s) Topical two times a day  piperacillin/tazobactam IVPB.. 3.375 Gram(s) IV Intermittent every 12 hours, Stop order after: 19 Doses  sodium chloride 0.9% lock flush 10 milliLiter(s) IV Push every 1 hour PRN Pre/post blood products, medications, blood draw, and to maintain line patency      ALLERGIES:   No Known Allergies      REVIEW OF SYMPTOMS:   CONSTITUTIONAL: No fever, no chills, no weight loss, no weight gain, no fatigue +nausea  ENMT:  No vertigo; No sinus or throat pain  NECK: No pain or stiffness  CARDIOVASCULAR: No chest pain, no dyspnea, no syncope/presyncope, no palpitations, no dizziness, no Orthopnea, no Paroxsymal nocturnal dyspnea  RESPIRATORY: no Shortness of breath, no cough, no wheezing  : No dysuria, no hematuria   GI: No dark color stool, no nausea, no diarrhea, no constipation, no abdominal pain   NEURO: No headache, no slurred speech   MUSCULOSKELETAL: No joint pain or swelling; No muscle, back, or extremity pain  PSYCH: No agitation, no anxiety.    ALL OTHER REVIEW OF SYSTEMS ARE NEGATIVE.    VITAL SIGNS:  T(C): 36.2 (23 @ 11:38), Max: 36.4 (23 @ 23:11)  T(F): 97.2 (23 @ 11:38), Max: 97.5 (23 @ 23:11)  HR: 81 (23 @ 12:00) (81 - 110)  BP: 108/55 (23 @ 15:39) (96/60 - 118/56)  RR: 18 (23 @ 15:39) (13 - 22)  SpO2: 92% (23 @ 15:39) (92% - 100%)    INTAKE AND OUTPUT:      @ 07:01  -   @ 07:00  --------------------------------------------------------  IN: 315 mL / OUT: 970 mL / NET: -655 mL     @ 07:01  -   @ 17:05  --------------------------------------------------------  IN: 0 mL / OUT: 90 mL / NET: -90 mL        PHYSICAL EXAM:  Constitutional: Comfortable . No acute distress.   HEENT: Atraumatic and normocephalic , neck is supple . no JVD. No carotid bruit.  CNS: A&Ox3. No focal deficits.   Respiratory: CTAB, unlabored   Cardiovascular: RRR normal s1 s2. No murmur. No rubs or gallop.  Gastrointestinal: Soft, non-tender. +Bowel sounds.   Extremities: 2+ Peripheral Pulses, No clubbing, cyanosis, or edema  Psychiatric: Calm . no agitation.   Skin: Warm and dry, no ulcers on extremities     LABS:  ( 2023 02:55 )  Troponin T  X    ,  CPK  X    , CKMB  X    , BNP 84518<H>    , ( 2023 03:38 )  Troponin T  0.60<H>,  CPK  X    , CKMB  X    , BNP X        , ( 2023 13:52 )  Troponin T  0.76<H>,  CPK  X    , CKMB  X    , BNP X                                  8.4    6.30  )-----------( 165      ( 2023 02:55 )             25.5     02-03    146<H>  |  111<H>  |  59.7<H>  ----------------------------<  143<H>  3.6   |  25.0  |  2.68<H>    Ca    8.4      2023 02:55  Phos  4.1     02-03  Mg     2.4     02-03    TPro  4.8<L>  /  Alb  2.8<L>  /  TBili  0.2<L>  /  DBili  x   /  AST  18  /  ALT  17  /  AlkPhos  56  02-03                INTERPRETATION OF TELEMETRY: SR    ECG: SR  Prior ECG: Yes [x  ] No [  ]

## 2023-02-03 NOTE — PROGRESS NOTE ADULT - ASSESSMENT
88y  Female with h/o TAVR, CAD, on ASA/plavix, HTN, DM, CKD. Patient was admitted on 1/28 with complaints of shortness of breath x 1 day and bloody bowel movements for the past several weeks. Patient was found to have H/H 6.7/22, Hypotensive. CXR suggestive of LLL pneumonia. Patient did not have evidence of active bleeding since admission to MICU. Started on IV vasopressor due to hypotension. Blood cultures are growing gram negative rods and gram positive cocci in pairs. 1/29 EGD remarkable for Duodenal ulcers s/p endoclip x 2. Patient continued to have multiple episodes of melena/hematochezia and taken to IR for coil embolization right gastroepiploic, SPDA, and GDA proper 1/30. Patient has been on Vancomycin and Zosyn since admission.        Klebsiella pneumoniae Bacteremia/Sepsis  Enterococcus faecalis  Bacteremia/sepsis  GI Bleed  Acute blood loss anemia   Leukocytosis   h/o TAVR       - Blood cultures 1/28 reporting Klebsiella pneumoniae, Enterococcus faecalis and 1 of 4 bottles with CoNS  - Repeat blood cultures 1/30 no growth   - CoNS likely contamination   - RVP/COVID 19 PCR 1/28 and 1/31 negative   - Check CT A/P if possible   - TTE with no veg  - Given 1 of 4 bottles of Klebsiella pneumoniae, Enterococcus and rapid clearance of bacteremia, unlikely endocarditis although unable to rule this out completely.   - Per palliative care notes, no further escalation of care, ? if this includes Ct A/P and Zosyn till 2/12/23 and surveillance blood cultures   - Procalcitonin level 0.46  - Continue Zosyn  - Plan for Zosyn till 2/12/23 followed by surveillance blood cultures   - Follow up cultures  - Trend Fever  - Trend WBC      Will Follow    d/w clinical pharmacy and Dr Campbell    88y  Female with h/o TAVR, CAD, on ASA/plavix, HTN, DM, CKD. Patient was admitted on 1/28 with complaints of shortness of breath x 1 day and bloody bowel movements for the past several weeks. Patient was found to have H/H 6.7/22, Hypotensive. CXR suggestive of LLL pneumonia. Patient did not have evidence of active bleeding since admission to MICU. Started on IV vasopressor due to hypotension. Blood cultures are growing gram negative rods and gram positive cocci in pairs. 1/29 EGD remarkable for Duodenal ulcers s/p endoclip x 2. Patient continued to have multiple episodes of melena/hematochezia and taken to IR for coil embolization right gastroepiploic, SPDA, and GDA proper 1/30. Patient has been on Vancomycin and Zosyn since admission.        Klebsiella pneumoniae Bacteremia/Sepsis  Enterococcus faecalis  Bacteremia/sepsis  GI Bleed  Acute blood loss anemia   Leukocytosis   h/o TAVR       - Blood cultures 1/28 reporting Klebsiella pneumoniae, Enterococcus faecalis and 1 of 4 bottles with CoNS  - Repeat blood cultures 1/30 no growth   - CoNS likely contamination   - RVP/COVID 19 PCR 1/28 and 1/31 negative   - Check CT A/P if possible   - TTE with no veg  - Given 1 of 4 bottles of Klebsiella pneumoniae, Enterococcus and rapid clearance of bacteremia, unlikely endocarditis although unable to rule this out completely.   - Per palliative care notes, no further escalation of care, ? if this includes CT A/P and Zosyn till 2/12/23 and surveillance blood cultures   - Procalcitonin level 0.46  - Continue Zosyn  - Plan for Zosyn till 2/12/23 followed by surveillance blood cultures   - Follow up cultures  - Trend Fever  - Trend WBC      Will Follow    d/w clinical pharmacy and Dr Dc

## 2023-02-03 NOTE — PROGRESS NOTE ADULT - SUBJECTIVE AND OBJECTIVE BOX
[facility]  INFECTIOUS DISEASES at [place] and [place]  =======================================================                               Luis Villarreal MD#   Melba Marquis MD*                             Niki Carroll MD*   Kiana Kam MD*            Diplomates American Board of Internal Medicine & Infectious Diseases                # [place] Office - Appt - Tel  [phone] Fax [phone]                * [place] Office - Appt - Tel [phone] Fax [phone]                                  Hospital Consult line:  [phone]  =======================================================    TATE CHEN [id number]    Follow up: Bacteremia    Off pressors  Awake  c/o nausea       Allergies:  No Known Allergies       REVIEW OF SYSTEMS:  CONSTITUTIONAL:  No Fever or chills  HEENT:  No diplopia or blurred vision.  No earache, sore throat or runny nose.  CARDIOVASCULAR:  No chest pain  RESPIRATORY:  No cough, shortness of breath  GASTROINTESTINAL:  + nausea. No vomiting or diarrhea.  GENITOURINARY:  [place]th   MUSCULOSKELETAL:  no joint aches, no muscle pain  SKIN:  No change in skin, hair or nails.  NEUROLOGIC:  No Headache  PSYCHIATRIC:  No disorder of thought or mood.  ENDOCRINE:  No heat or cold intolerance  HEMATOLOGICAL:  No easy bruising or bleeding.       Physical Exam:  GEN: lethargic   HEENT: normocephalic and atraumatic. EOMI. PERRL.    NECK: Supple.   LUNGS: Decreased basal BS B/L   HEART: RRR  ABDOMEN: Soft, NT, ND.  +BS.    : Sheth   EXTREMITIES: trace edema.  MSK: No joint swelling  NEUROLOGIC: Awake alert answering questions   PSYCHIATRIC: Appropriate affect   SKIN: No rash      Vitals:  T(F): 97.1 (03 Feb 2023 08:03), Max: 97.9 (02 Feb 2023 12:00)  HR: 85 (03 Feb 2023 08:00)  BP: 96/60 (03 Feb 2023 08:00)  RR: 21 (03 Feb 2023 08:00)  SpO2: 97% (03 Feb 2023 08:00) (94% - 100%)  temp max in last 48H T(F): , Max: 97.9 (02-01-23 @ 08:45)    Current Antibiotics:  piperacillin/tazobactam IVPB.. 3.375 Gram(s) IV Intermittent every 12 hours    Other medications:  albuterol/ipratropium for Nebulization 3 milliLiter(s) Nebulizer every 6 hours  dextrose 50% Injectable 25 Gram(s) IV Push once  dextrose 50% Injectable 12.5 Gram(s) IV Push once  dextrose 50% Injectable 25 Gram(s) IV Push once  furosemide   Injectable 40 milliGRAM(s) IV Push every 24 hours  glucagon  Injectable 1 milliGRAM(s) IntraMuscular once  influenza  Vaccine (HIGH DOSE) 0.7 milliLiter(s) IntraMuscular once  insulin glargine Injectable (LANTUS) 15 Unit(s) SubCutaneous at bedtime  insulin lispro (ADMELOG) corrective regimen sliding scale   SubCutaneous every 6 hours  iron sucrose IVPB 100 milliGRAM(s) IV Intermittent every 24 hours  levothyroxine 125 MICROGram(s) Oral at bedtime  midodrine      midodrine 10 milliGRAM(s) Oral three times a day  nystatin Powder 1 Application(s) Topical two times a day  pantoprazole  Injectable 40 milliGRAM(s) IV Push two times a day  sertraline 50 milliGRAM(s) Oral daily  traZODone 50 milliGRAM(s) Oral at bedtime                            8.4    6.30  )-----------( 165      ( 03 Feb 2023 02:55 )             25.5     02-03    146<H>  |  111<H>  |  59.7<H>  ----------------------------<  143<H>  3.6   |  25.0  |  2.68<H>    Ca    8.4      03 Feb 2023 02:55  Phos  4.1     02-03  Mg     2.4     02-03    TPro  4.8<L>  /  Alb  2.8<L>  /  TBili  0.2<L>  /  DBili  x   /  AST  18  /  ALT  17  /  AlkPhos  56  02-03    RECENT CULTURES:  02-01 @ 17:20    RVP  NotDetec    01-31 @ 08:35 .Blood Blood-Peripheral     No growth to date.    01-31 @ 08:15 .Blood Blood-Peripheral     No growth to date.    01-30 @ 08:45 .Blood Blood     No growth to date.    01-28 @ 11:15 .Blood Blood-Peripheral Blood Culture PCR  Klebsiella pneumoniae  Enterococcus faecalis    Growth in aerobic and anaerobic bottles: Klebsiella pneumoniae  Growth in aerobic and anaerobic bottles: Enterococcus faecalis  Growth in aerobic bottle: Coag Negative Staphylococcus Unable to Identify  further  Coag Negative Staphylococcus  Singleset isolate, possible contaminant. Contact  Microbiology if susceptibility testing clinically  indicated.  ***Blood Panel PCR results on this specimen are available  approximately 3 hours after the Gram stain result.***  Gram stain, PCR, and/or culture results may not always  correspond due to difference in methodologies.  ************************************************************  This PCR assay was performed by multiplex PCR. This  Assay tests for 66 bacterial and resistance gene targets.  Please refer to the [facility] test directory  at [email or web address] for details.  Growth in aerobic bottle: Gram Negative Rods and Gram positive cocci in  pairs  Growth in anaerobic bottle: Gram Negative Rods and Gram positive cocci in  pairs    01-28 @ 10:08 .Blood Blood-Peripheral     No Growth Final      WBC Count: 6.30 K/uL (02-03-23 @ 02:55)  WBC Count: 6.85 K/uL (02-02-23 @ 13:00)  WBC Count: 6.54 K/uL (02-02-23 @ 03:38)  WBC Count: 7.61 K/uL (02-01-23 @ 22:25)  WBC Count: 7.71 K/uL (02-01-23 @ 13:52)  WBC Count: 12.35 K/uL (02-01-23 @ 05:00)  WBC Count: 8.53 K/uL (02-01-23 @ 00:04)  WBC Count: 13.66 K/uL (01-31-23 @ 04:00)  WBC Count: 13.23 K/uL (01-30-23 @ 21:29)  WBC Count: 9.25 K/uL (01-30-23 @ 16:56)  WBC Count: 10.38 K/uL (01-30-23 @ 03:40)  WBC Count: 10.78 K/uL (01-29-23 @ 21:30)  WBC Count: 10.19 K/uL (01-29-23 @ 18:11)  WBC Count: 15.40 K/uL (01-29-23 @ 10:16)    Creatinine, Serum: 2.68 mg/dL (02-03-23 @ 02:55)  Creatinine, Serum: 2.62 mg/dL (02-02-23 @ 03:38)  Creatinine, Serum: 2.63 mg/dL (02-01-23 @ 13:52)  Creatinine, Serum: 2.53 mg/dL (02-01-23 @ 05:00)  Creatinine, Serum: 2.52 mg/dL (02-01-23 @ 00:04)  Creatinine, Serum: 2.33 mg/dL (01-31-23 @ 04:00)  Creatinine, Serum: 2.46 mg/dL (01-30-23 @ 03:40)  Creatinine, Serum: 2.47 mg/dL (01-29-23 @ 21:30)  Creatinine, Serum: 2.48 mg/dL (01-29-23 @ 17:00)  Creatinine, Serum: 2.35 mg/dL (01-29-23 @ 10:16)    Procalcitonin, Serum: 0.46 ng/mL (01-29-23 @ 04:11)  Procalcitonin, Serum: 0.37 ng/mL (01-28-23 @ 21:28)     SARS-CoV-2: NotDetec (02-01-23 @ 17:20)  SARS-CoV-2: NotDetec (01-28-23 @ 10:08)        < from: Xray Chest 1 View- PORTABLE-Urgent (Xray Chest 1 View- PORTABLE-Urgent .) (01.31.23 @ 09:22) >    ACC: [id number] EXAM:  XR CHEST PORTABLE URGENT 1V   ORDERED BY: KOURTNEY   VENESSA     PROCEDURE DATE:  01/31/2023      INTERPRETATION:  AP chest on January 31, 2023 at 9:11 AM. Patient has a   GI bleed and is short of breath.    Heart magnified by technique. However aortic valve again noted.    There is an increasing left base effusion now mild and is significant   increase right base infiltrate with pleural component.    Incidental azygous lobe.    IMPRESSION: Increasing bilateral lung and pleural findings.    --- End of Report ---  < end of copied text >      < from: TTE Echo Complete w/o Contrast w/ Doppler (01.31.23 @ 13:36) >  Summary:   1. Technically difficult study.   2. Left ventricular ejection fraction, by visual estimation, is 55 to 60%.   3. Spectral Doppler shows impaired relaxation pattern of left   ventricular myocardial filling (Grade I diastolic dysfunction).   4. There is mild concentric left ventricular hypertrophy.   5. Mild mitral valve regurgitation.   6. Moderate mitralannular calcification.   7. Thickening and calcification of the anterior and posterior mitral   valve leaflets.   8. Mild tricuspid regurgitation.   9. TAVR in the aortic position.  10. Peak aortic valve gradient is 9.9 mmHg and the mean gradient is 5.5   mmHg, which is probably normal in the setting of a prosthetic aortic valve.  11. Estimated pulmonary artery systolic pressure is 47.4 mmHg assuming a   right atrial pressure of 8 mmHg, which is consistent with mild pulmonary   hypertension.  12. Suboptimal study for evaluation of endocarditis, consider LISSA if   clinically indicated.    < end of copied text >

## 2023-02-03 NOTE — PROGRESS NOTE ADULT - CONVERSATION DETAILS
Called and spoke with HCP son Juan along with Palliative SW  We provided general overnight events including but not limited to new onset of nausea and more recently atypical chest pain per writer's discussion with hospitalist this afternoon, slight worsening of hemoglobin and renal function.     Son affirmed decisions made yesterday for no escalation of care, no IV vasopressors, No transfusion, no "invasive interventions" and no testing that would involve contrast or anesthesia as it would predispose mother to higher risk of complications. Remains DNR/DNI MOLST in chart.    Juan is okay with all other conservative medical mgnt including antibiotics, imaging. He requested for continued updates should mother have any further clinical deterioration. Reassurance provided and all questions answered. He also advised that brother Austin will be by shortly as well.     Updated hospitalist on above conversation. Called and spoke with HCP son Juan along with Palliative SW  We provided general overnight events including but not limited to new onset of nausea and more recently atypical chest pain per writer's discussion with hospitalist this afternoon, slight worsening of hemoglobin and renal function.     Son affirmed decisions made yesterday for no escalation of care, no IV vasopressors, No transfusion, no "invasive interventions" and no testing that would involve contrast or anesthesia as it would predispose mother to higher risk of complications. Remains DNR/DNI MOLST in chart.    Juan is okay with all other conservative medical mgnt including antibiotics, imaging. He requested for continued updates should mother have any further clinical deterioration. We also broach what comfort measures would look like if that juncture would occur. Reassurance provided and all questions answered. He also advised that brother Austin will be by shortly as well.     Updated hospitalist on above conversation.

## 2023-02-03 NOTE — PROGRESS NOTE ADULT - ASSESSMENT
87 y/o F with a h/o TAVR, CAD, on ASA/ Plavix, HTN, DM, CKD admitted with Acute blood loss anemia, septic shock, bacteremia. GI re-consulted for nausea

## 2023-02-03 NOTE — PROGRESS NOTE ADULT - PROBLEM SELECTOR PLAN 1
EGD 1/29/23- 8mm D2 non-bleeding ulcer, 10mm D2 cratered ulcer with visible vessel s/p cautery, s/p Endoclip x 2  S/p IR coil embolization of the R gastroepiploic, SPDA and GDA proper on 1/30/23. HP antigen negative.   Re-consulted for nausea      - Recommend cardiac workup for chest pressure, recommend evaluation for non-GI causes of nausea   - Continue antiemetics  - Trend CBC, transfuse as needed, goal Hgb > 8. Monitor for signs of bleeding.   - IV PPI BID, Will need PO PPI BID x 12 weeks   _________________________________________________________________  Assessment and recommendations are final when note is signed by the attending physician.
EGD 1/29/23- 8mm D2 non-bleeding ulcer, 10mm D2 cratered ulcer with visible vessel s/p cautery, s/p Endoclip x 2  S/p IR coil embolization of the R gastroepiploic, SPDA and GDA proper on 1/30/23     - Trend CBC, transfuse as needed, goal Hgb > 8. Monitor for signs of bleeding.   - Avoid NSAIDs and antiplatelet at this time   - IV PPI ggt for 72 hrs in total, then can change to BID. Will need PO PPI BID x 12 weeks   - H. pylori antigen pending at this time  _________________________________________________________________  Assessment and recommendations are final when note is signed by the attending physician.

## 2023-02-03 NOTE — PROGRESS NOTE ADULT - SUBJECTIVE AND OBJECTIVE BOX
Chief Complaint:  Patient is a 88y old  Female who presents with a chief complaint of GIB (03 Feb 2023 01:59)      HPI/ 24 hr events: GI reconsulted today for nausea.   Patient seen and examined at bedside. Patient reports nausea with associated chest pressure that started this morning. She has no appetite. Her nausea is not controlled with zofran or reglan she was given. She is pending administration of compazine at this time. She also feels some phlegm in her throat. She had BM x 2 this morning per her RN, she does not feel constipated. Denies vomiting, diarrhea, abdominal pain. Vitals are stable. Hgb 8.4.     REVIEW OF SYSTEMS:   General: Negative  HEENT: Negative  CV: Negative  Respiratory: Negative  GI: See HPI  : Negative  MSK: Negative  Hematologic: Negative  Skin: Negative    MEDICATIONS:   MEDICATIONS  (STANDING):  albuterol/ipratropium for Nebulization 3 milliLiter(s) Nebulizer every 6 hours  dextrose 50% Injectable 25 Gram(s) IV Push once  dextrose 50% Injectable 12.5 Gram(s) IV Push once  dextrose 50% Injectable 25 Gram(s) IV Push once  furosemide   Injectable 40 milliGRAM(s) IV Push every 24 hours  glucagon  Injectable 1 milliGRAM(s) IntraMuscular once  influenza  Vaccine (HIGH DOSE) 0.7 milliLiter(s) IntraMuscular once  insulin glargine Injectable (LANTUS) 15 Unit(s) SubCutaneous at bedtime  insulin lispro (ADMELOG) corrective regimen sliding scale   SubCutaneous every 6 hours  iron sucrose IVPB 100 milliGRAM(s) IV Intermittent every 24 hours  levothyroxine 125 MICROGram(s) Oral at bedtime  midodrine      midodrine 10 milliGRAM(s) Oral three times a day  nystatin Powder 1 Application(s) Topical two times a day  pantoprazole  Injectable 40 milliGRAM(s) IV Push two times a day  piperacillin/tazobactam IVPB.. 3.375 Gram(s) IV Intermittent every 12 hours  prochlorperazine   Injectable 10 milliGRAM(s) IntraMuscular every 8 hours  sertraline 50 milliGRAM(s) Oral daily  traZODone 50 milliGRAM(s) Oral at bedtime    MEDICATIONS  (PRN):  acetylcysteine 20%  Inhalation 4 milliLiter(s) Inhalation two times a day PRN chest congestion  benzocaine/menthol Lozenge 1 Lozenge Oral every 4 hours PRN Sore Throat  benzonatate 100 milliGRAM(s) Oral three times a day PRN Cough  bismuth subsalicylate Liquid 5 milliLiter(s) Oral every 8 hours PRN dyspepsia  guaiFENesin  milliGRAM(s) Oral every 12 hours PRN Cough  melatonin 5 milliGRAM(s) Oral at bedtime PRN Sleep  morphine  - Injectable 1 milliGRAM(s) IV Push every 4 hours PRN pain  ondansetron Injectable 4 milliGRAM(s) IV Push every 6 hours PRN Nausea and/or Vomiting  sodium chloride 0.9% lock flush 10 milliLiter(s) IV Push every 1 hour PRN Pre/post blood products, medications, blood draw, and to maintain line patency      ALLERGIES:   Allergies    No Known Allergies    Intolerances        VITAL SIGNS:   Vital Signs Last 24 Hrs  T(C): 36.2 (03 Feb 2023 11:38), Max: 36.4 (02 Feb 2023 15:48)  T(F): 97.2 (03 Feb 2023 11:38), Max: 97.5 (02 Feb 2023 15:48)  HR: 81 (03 Feb 2023 12:00) (81 - 110)  BP: 118/56 (03 Feb 2023 12:00) (85/65 - 118/56)  BP(mean): 75 (03 Feb 2023 12:00) (66 - 78)  RR: 16 (03 Feb 2023 12:00) (13 - 27)  SpO2: 96% (03 Feb 2023 12:00) (95% - 100%)    Parameters below as of 03 Feb 2023 08:53  Patient On (Oxygen Delivery Method): room air      I&O's Summary    02 Feb 2023 07:01  -  03 Feb 2023 07:00  --------------------------------------------------------  IN: 315 mL / OUT: 970 mL / NET: -655 mL        PHYSICAL EXAM:   GENERAL:  No acute distress  HEENT:  NC/AT, conjunctiva clear, sclera anicteric  CHEST:  No increased effort, breath sounds coarse   HEART:  Regular rhythm  ABDOMEN:  Soft, obese, non-tender, non-distended, normoactive bowel sounds, no rebound or guarding  EXTREMITIES: + Edema  SKIN:  Warm, dry  NEURO:  Calm, cooperative    LABS:                        8.4    6.30  )-----------( 165      ( 03 Feb 2023 02:55 )             25.5     02-03    146<H>  |  111<H>  |  59.7<H>  ----------------------------<  143<H>  3.6   |  25.0  |  2.68<H>    Ca    8.4      03 Feb 2023 02:55  Phos  4.1     02-03  Mg     2.4     02-03    TPro  4.8<L>  /  Alb  2.8<L>  /  TBili  0.2<L>  /  DBili  x   /  AST  18  /  ALT  17  /  AlkPhos  56  02-03    LIVER FUNCTIONS - ( 03 Feb 2023 02:55 )  Alb: 2.8 g/dL / Pro: 4.8 g/dL / ALK PHOS: 56 U/L / ALT: 17 U/L / AST: 18 U/L / GGT: x                                               RADIOLOGY & ADDITIONAL STUDIES:

## 2023-02-03 NOTE — CONSULT NOTE ADULT - SUBJECTIVE AND OBJECTIVE BOX
CHIEF COMPLAINT/INTERVAL HISTORY:    Patient is a 88y old  Female who presents with a chief complaint of GIB (30 Jan 2023 14:56)      HPI:      Patient is a 88y old  Female who presents with a chief complaint of shortness of breath     BRIEF HOSPITAL COURSE: 89 y/o F hx of aortic valve repair, CAD on ASA/plavix, HTN, DM presented with shortness of breath for the past 1 day. endorsing bloody bowel movements for the "past several weeks." Denies prior episodes of GI bleeding. was recently treated with z-pack for bronchitis per patient. denies prior colonoscopy/endoscopy in the past. Denies NSAID use.   In the ER, patient hypotensive with SBP in the 70s on arrival. Placed on 4L nasal cannula for shortness of breath and hypoxia by ER team. 1 unit of PRBC started aprox 1 hour ago, 2 units prbc in total. chest xray shows LLL PNA - ceftriaxone and vancomycin given. Potassium 6.8 w/ no acute ischemic changes on EKG. 10U IVP insulin, amp of d50, 2g calcium gluconate and albuterol given for hyperK treatment in the ER. guaiac positive, GI consulted and plan is for EGD tomorrow morning.       PAST MEDICAL & SURGICAL HISTORY:    Allergies    No Known Allergies    Intolerances          Review of Systems:  CONSTITUTIONAL: No fever, chills, or fatigue  EYES: No eye pain, visual disturbances, or discharge  ENMT:  No difficulty hearing, tinnitus, vertigo; No sinus or throat pain  NECK: No pain or stiffness  RESPIRATORY: No cough, wheezing, chills or hemoptysis; + shortness of breath  CARDIOVASCULAR: No chest pain, palpitations, dizziness, or leg swelling  GASTROINTESTINAL: Denies abdominal or epigastric pain. No nausea, vomiting, or hematemesis; No diarrhea or constipation. +melena or hematochezia.  GENITOURINARY: No dysuria, frequency, hematuria, or incontinence  NEUROLOGICAL: No headaches, memory loss, loss of strength, numbness, or tremors  SKIN: No itching, burning, rashes, or lesions   MUSCULOSKELETAL: No joint pain or swelling; No muscle, back, or extremity pain  PSYCHIATRIC: No depression, anxiety, mood swings, or difficulty sleeping      Medications:              pantoprazole Infusion 8 mG/Hr IV Continuous <Continuous>                      ICU Vital Signs Last 24 Hrs  T(C): 36.7 (28 Jan 2023 12:10), Max: 37.1 (28 Jan 2023 09:03)  T(F): 98 (28 Jan 2023 12:10), Max: 98.8 (28 Jan 2023 09:03)  HR: 82 (28 Jan 2023 13:55) (64 - 82)  BP: 85/42 (28 Jan 2023 13:55) (70/44 - 122/53)  BP(mean): --  ABP: --  ABP(mean): --  RR: 20 (28 Jan 2023 13:55) (18 - 24)  SpO2: 95% (28 Jan 2023 13:55) (93% - 100%)    O2 Parameters below as of 28 Jan 2023 13:55  Patient On (Oxygen Delivery Method): room air          Vital Signs Last 24 Hrs  T(C): 36.7 (28 Jan 2023 12:10), Max: 37.1 (28 Jan 2023 09:03)  T(F): 98 (28 Jan 2023 12:10), Max: 98.8 (28 Jan 2023 09:03)  HR: 82 (28 Jan 2023 13:55) (64 - 82)  BP: 85/42 (28 Jan 2023 13:55) (70/44 - 122/53)  BP(mean): --  RR: 20 (28 Jan 2023 13:55) (18 - 24)  SpO2: 95% (28 Jan 2023 13:55) (93% - 100%)    Parameters below as of 28 Jan 2023 13:55  Patient On (Oxygen Delivery Method): room air            I&O's Detail        LABS:                        6.7    8.79  )-----------( 188      ( 28 Jan 2023 10:08 )             22.3     01-28    140  |  104  |  82.6<H>  ----------------------------<  299<H>  6.8<HH>   |  22.0  |  2.38<H>    Ca    9.6      28 Jan 2023 10:08  Mg     2.1     01-28    TPro  4.6<L>  /  Alb  2.6<L>  /  TBili  <0.2<L>  /  DBili  x   /  AST  22  /  ALT  10  /  AlkPhos  70  01-28      CARDIAC MARKERS ( 28 Jan 2023 10:08 )  x     / <0.01 ng/mL / x     / x     / x          CAPILLARY BLOOD GLUCOSE        PT/INR - ( 28 Jan 2023 10:08 )   PT: 12.2 sec;   INR: 1.05 ratio         PTT - ( 28 Jan 2023 10:08 )  PTT:24.2 sec    CULTURES:      Physical Examination:    General: No acute distress.  Alert, oriented, interactive, nonfocal. pale appearing female.     HEENT: Pupils equal, reactive to light.  Symmetric.    PULM: Clear to auscultation bilaterally, no significant sputum production    CVS: Regular rate and rhythm, no murmurs, rubs, or gallops    ABD: Soft, nondistended, nontender, normoactive bowel sounds, no masses    EXT: No edema, nontender    SKIN: Warm and well perfused, no rashes noted.    CRITICAL CARE TIME SPENT: 30 minutes    (28 Jan 2023 14:07)      SUBJECTIVE & OBJECTIVE: Pt seen and examined at bedside.     ICU Vital Signs Last 24 Hrs  T(C): 36.4 (02 Feb 2023 23:11), Max: 36.6 (02 Feb 2023 12:00)  T(F): 97.5 (02 Feb 2023 23:11), Max: 97.9 (02 Feb 2023 12:00)  HR: 95 (03 Feb 2023 00:00) (84 - 104)  BP: 115/53 (03 Feb 2023 00:00) (85/65 - 116/98)  BP(mean): 71 (03 Feb 2023 00:00) (55 - 106)  ABP: --  ABP(mean): --  RR: 16 (03 Feb 2023 00:00) (12 - 27)  SpO2: 95% (03 Feb 2023 00:00) (94% - 100%)    O2 Parameters below as of 02 Feb 2023 20:00  Patient On (Oxygen Delivery Method): nasal cannula  O2 Flow (L/min): 2    MEDICATIONS  (STANDING):  albuterol/ipratropium for Nebulization 3 milliLiter(s) Nebulizer every 6 hours  dextrose 50% Injectable 25 Gram(s) IV Push once  dextrose 50% Injectable 12.5 Gram(s) IV Push once  dextrose 50% Injectable 25 Gram(s) IV Push once  furosemide   Injectable 40 milliGRAM(s) IV Push every 24 hours  glucagon  Injectable 1 milliGRAM(s) IntraMuscular once  influenza  Vaccine (HIGH DOSE) 0.7 milliLiter(s) IntraMuscular once  insulin glargine Injectable (LANTUS) 15 Unit(s) SubCutaneous at bedtime  insulin lispro (ADMELOG) corrective regimen sliding scale   SubCutaneous every 6 hours  iron sucrose IVPB 100 milliGRAM(s) IV Intermittent every 24 hours  levothyroxine 125 MICROGram(s) Oral at bedtime  midodrine      midodrine 10 milliGRAM(s) Oral three times a day  nystatin Powder 1 Application(s) Topical two times a day  pantoprazole  Injectable 40 milliGRAM(s) IV Push two times a day  piperacillin/tazobactam IVPB.. 3.375 Gram(s) IV Intermittent every 12 hours  sertraline 50 milliGRAM(s) Oral daily  traZODone 50 milliGRAM(s) Oral at bedtime    MEDICATIONS  (PRN):  acetylcysteine 20%  Inhalation 4 milliLiter(s) Inhalation two times a day PRN chest congestion  benzocaine/menthol Lozenge 1 Lozenge Oral every 4 hours PRN Sore Throat  benzonatate 100 milliGRAM(s) Oral three times a day PRN Cough  guaiFENesin  milliGRAM(s) Oral every 12 hours PRN Cough  melatonin 5 milliGRAM(s) Oral at bedtime PRN Sleep  ondansetron Injectable 4 milliGRAM(s) IV Push every 6 hours PRN Nausea and/or Vomiting  sodium chloride 0.9% lock flush 10 milliLiter(s) IV Push every 1 hour PRN Pre/post blood products, medications, blood draw, and to maintain line patency      LABS:                        8.9    6.85  )-----------( 158      ( 02 Feb 2023 13:00 )             27.2     02-02    141  |  107  |  65.1<H>  ----------------------------<  85  3.6   |  23.0  |  2.62<H>    Ca    8.1<L>      02 Feb 2023 03:38  Phos  4.2     02-02  Mg     2.3     02-02    TPro  4.9<L>  /  Alb  2.8<L>  /  TBili  0.4  /  DBili  x   /  AST  52<H>  /  ALT  24  /  AlkPhos  60  02-01          CAPILLARY BLOOD GLUCOSE      POCT Blood Glucose.: 189 mg/dL (03 Feb 2023 01:28)  POCT Blood Glucose.: 244 mg/dL (02 Feb 2023 22:49)  POCT Blood Glucose.: 213 mg/dL (02 Feb 2023 21:31)  POCT Blood Glucose.: 123 mg/dL (02 Feb 2023 16:36)  POCT Blood Glucose.: 155 mg/dL (02 Feb 2023 10:46)  POCT Blood Glucose.: 119 mg/dL (02 Feb 2023 06:26)      PHYSICAL EXAM:    GENERAL: NAD, well-groomed, well-developed  HEAD:  Atraumatic, Normocephalic  EYES: EOMI, PERRLA, conjunctiva and sclera clear  ENMT: Moist mucous membranes  NECK: Supple, No JVD  NERVOUS SYSTEM:  Alert & Oriented X3, Motor Strength 5/5 B/L upper and lower extremities; DTRs 2+ intact and symmetric  CHEST/LUNG: Clear to auscultation bilaterally; No rales, rhonchi, wheezing, or rubs  HEART: Regular rate and rhythm; No murmurs, rubs, or gallops  ABDOMEN: Soft, Nontender, Nondistended; Bowel sounds present  EXTREMITIES:  2+ Peripheral Pulses, No clubbing, cyanosis, or edema        CHIEF COMPLAINT/INTERVAL HISTORY:    Patient is a 88y old  Female who presents with a chief complaint of GIB (30 Jan 2023 14:56)      HPI:    Patient is a 88y old  Female who presents with a chief complaint of shortness of breath     BRIEF HOSPITAL COURSE: Patient is a 88 year old female with history of AS s/p TAVR, CAD on ASA/plavix, HTN, and DM who presented to the ED with complaints of shortness of breath and several weeks of bloody bowel movements. Upon arrival , patient  was hypotensive with SBP in the 70s and patient was also noted to be hypoxic and placed on 4L nasal cannula. Chest xray shows LLL PNA; patient was cultured and started on ceftriaxone and vancomycin given. Labs were remarkable for a Hb of 6.8 and Potassium of 6.8 w/ no acute ischemic changes on EKG. Patient received medical management for hyperkalemia and PRBCs for guaiac positive stool. GI was consulted and patient underwent an EGD with cautery and endoclip for a duodenal ulcer on 1/29 which was then followed by IR coil emobolization Patient received several units of blood products. Hospital course further complicated by shock, bacteremia, new onset Afib with RVR and NSTEMI. Patient was successfully weaned off vasopressors and started on PO midodrine. Course further complicated by iatrogenic fluid overload for which patient was started on aggressive IV diuresis, which had to be reduced given hypotension. Palliative care was consulted; and patient and family decided against any further transfusions, vasopressors or procedures. Patient is now medically stable for transfer to the medical service.      PAST MEDICAL & SURGICAL HISTORY: as above  Social Hx - Denies tobacco/illicit drug use  Family Hx - Non-contributory    SUBJECTIVE & OBJECTIVE: Pt seen and examined at bedside. Patient complaining of chest congestion and inability to lay flat. Started on mucinex/mucomyst earlier today. CT chest pending. MBS also scheduled for 2/3. Patient otherwise denies any new complaints.     ROS: No palpitations,, light headedness, dizziness, headache, nausea/vomiting, fevers/chills, abdominal pain, dysuria or increased urinary frequency.    ICU Vital Signs Last 24 Hrs  T(C): 36.4 (02 Feb 2023 23:11), Max: 36.6 (02 Feb 2023 12:00)  T(F): 97.5 (02 Feb 2023 23:11), Max: 97.9 (02 Feb 2023 12:00)  HR: 95 (03 Feb 2023 00:00) (84 - 104)  BP: 115/53 (03 Feb 2023 00:00) (85/65 - 116/98)  BP(mean): 71 (03 Feb 2023 00:00) (55 - 106)  ABP: --  ABP(mean): --  RR: 16 (03 Feb 2023 00:00) (12 - 27)  SpO2: 95% (03 Feb 2023 00:00) (94% - 100%)    O2 Parameters below as of 02 Feb 2023 20:00  Patient On (Oxygen Delivery Method): nasal cannula  O2 Flow (L/min): 2    MEDICATIONS  (STANDING):  albuterol/ipratropium for Nebulization 3 milliLiter(s) Nebulizer every 6 hours  dextrose 50% Injectable 25 Gram(s) IV Push once  dextrose 50% Injectable 12.5 Gram(s) IV Push once  dextrose 50% Injectable 25 Gram(s) IV Push once  furosemide   Injectable 40 milliGRAM(s) IV Push every 24 hours  glucagon  Injectable 1 milliGRAM(s) IntraMuscular once  influenza  Vaccine (HIGH DOSE) 0.7 milliLiter(s) IntraMuscular once  insulin glargine Injectable (LANTUS) 15 Unit(s) SubCutaneous at bedtime  insulin lispro (ADMELOG) corrective regimen sliding scale   SubCutaneous every 6 hours  iron sucrose IVPB 100 milliGRAM(s) IV Intermittent every 24 hours  levothyroxine 125 MICROGram(s) Oral at bedtime  midodrine      midodrine 10 milliGRAM(s) Oral three times a day  nystatin Powder 1 Application(s) Topical two times a day  pantoprazole  Injectable 40 milliGRAM(s) IV Push two times a day  piperacillin/tazobactam IVPB.. 3.375 Gram(s) IV Intermittent every 12 hours  sertraline 50 milliGRAM(s) Oral daily  traZODone 50 milliGRAM(s) Oral at bedtime    MEDICATIONS  (PRN):  acetylcysteine 20%  Inhalation 4 milliLiter(s) Inhalation two times a day PRN chest congestion  benzocaine/menthol Lozenge 1 Lozenge Oral every 4 hours PRN Sore Throat  benzonatate 100 milliGRAM(s) Oral three times a day PRN Cough  guaiFENesin  milliGRAM(s) Oral every 12 hours PRN Cough  melatonin 5 milliGRAM(s) Oral at bedtime PRN Sleep  ondansetron Injectable 4 milliGRAM(s) IV Push every 6 hours PRN Nausea and/or Vomiting  sodium chloride 0.9% lock flush 10 milliLiter(s) IV Push every 1 hour PRN Pre/post blood products, medications, blood draw, and to maintain line patency      LABS:                        8.9    6.85  )-----------( 158      ( 02 Feb 2023 13:00 )             27.2     02-02    141  |  107  |  65.1<H>  ----------------------------<  85  3.6   |  23.0  |  2.62<H>    Ca    8.1<L>      02 Feb 2023 03:38  Phos  4.2     02-02  Mg     2.3     02-02    TPro  4.9<L>  /  Alb  2.8<L>  /  TBili  0.4  /  DBili  x   /  AST  52<H>  /  ALT  24  /  AlkPhos  60  02-01          CAPILLARY BLOOD GLUCOSE      POCT Blood Glucose.: 189 mg/dL (03 Feb 2023 01:28)  POCT Blood Glucose.: 244 mg/dL (02 Feb 2023 22:49)  POCT Blood Glucose.: 213 mg/dL (02 Feb 2023 21:31)  POCT Blood Glucose.: 123 mg/dL (02 Feb 2023 16:36)  POCT Blood Glucose.: 155 mg/dL (02 Feb 2023 10:46)  POCT Blood Glucose.: 119 mg/dL (02 Feb 2023 06:26)      PHYSICAL EXAM:    GENERAL: elderly female, laying in recliner, not in acute respiratory distress  HEAD:  Atraumatic, Normocephalic  EYES: conjunctiva and sclera clear  ENMT: Moist mucous membranes  NECK: Supple   NERVOUS SYSTEM:  Alert & Oriented X3, generalized weakness  CHEST/LUNG: + rhonchi  HEART: Regular rate and rhythm; + S1/S2  ABDOMEN: Soft, Nontender, obese; Bowel sounds present  EXTREMITIES:  ++ edema

## 2023-02-03 NOTE — PROGRESS NOTE ADULT - ASSESSMENT
88F with CAD, aortic valve repair, HTN, DM, admitted with acute blood loss anemia/gi bleed s/p EGD/IR intervention, course complicated by bacteremia/septic shock, and new onset afib with RVR and NSTEMI.     Hypotension  - s/p mixed shock requiring pressor support this admission  - labile BP despite on midodrine 10mg TID  - per GOC yesterday, no escalation of care and no further IV Vasopressor if warranted    Acute Blood Loss Anemia/GI Bleeding  - s/p EGD and IR interventions on 1/29 and 1/30   - H/H trending down slowly, PPI  - serial CBC     NSTEMI  - not a candidate for anticoagulation due to recent gastrointestinal hemorrhage and thus not a candidate for further workup like cardiac cath    Bacteremia  - unclear etiology  - repeat cultures negative  - antibiotics per infectious diseases   - pending imaging of abdomen to help identify source    Palliative Care Encounter 88F with CAD, aortic valve repair, HTN, DM, admitted with acute blood loss anemia/gi bleed s/p EGD/IR intervention, course complicated by bacteremia/septic shock, and new onset afib with RVR and NSTEMI.     Hypotension  - s/p mixed shock requiring pressor support this admission  - labile BP despite on midodrine 10mg TID  - per GOC yesterday, no escalation of care and no further IV Vasopressor if warranted    Acute Blood Loss Anemia/GI Bleeding  - s/p EGD and IR interventions on 1/29 and 1/30   - H/H trending down slowly, PPI  - serial CBC     NSTEMI  - not a candidate for anticoagulation due to recent gastrointestinal hemorrhage and thus not a candidate for further workup like cardiac cath    Bacteremia  - unclear etiology  - repeat cultures negative  - pending CT imag    Palliative Care Encounter 88F with CAD, aortic valve repair, HTN, DM, admitted with acute blood loss anemia/gi bleed s/p EGD/IR intervention, course complicated by bacteremia/septic shock, and new onset afib with RVR and NSTEMI.     Hypotension  - s/p mixed shock requiring pressor support this admission  - labile BP despite on midodrine 10mg TID  - per GOC yesterday, no escalation of care and no further IV Vasopressor if warranted    Acute Blood Loss Anemia/GI Bleeding  - s/p EGD and IR interventions on 1/29 and 1/30   - H/H trending down slowly, PPI  - serial CBC     NSTEMI  - not a candidate for anticoagulation due to recent gastrointestinal hemorrhage and thus not a candidate for further workup like cardiac cath    Bacteremia  - unclear etiology  - repeat cultures negative  - pending CT scans  - per ID, plan for Zosyn till 2/12/23 followed by surveillance blood cultures     Nausea  - unclear etiology  - intermittent despite zofran  - reglan ordered and discussed with ICU pharmacist   - recommend IV compazine if refractory symptoms    Palliative Care Encounter  - DNR/DNI MOLST  - HCP is jyothi [place] (out of [place]) and alternate jyothi Martinez  - GOC at present is no escalation of care including no IV vasopressor, no IV transfusion, no invasive intervention or testing that involves contrast or anesthesia. Son is okay with all other conservative medical mgnt.   - Overall prognosis is guarded at best  - Palliative team to follow for support and clinical progress for ongoing goc 88F with CAD, aortic valve repair, HTN, DM, admitted with acute blood loss anemia/gi bleed s/p EGD/IR intervention, course complicated by bacteremia/septic shock, and new onset afib with RVR and NSTEMI.     Hypotension  - s/p mixed shock requiring pressor support this admission  - labile BP despite on midodrine 10mg TID  - per GOC yesterday, no escalation of care and no further IV Vasopressor if warranted    Acute Blood Loss Anemia/GI Bleeding  - s/p EGD and IR interventions on 1/29 and 1/30   - H/H trending down slowly, PPI  - no escalation of care to including no transfusion --> if worsening please discuss with son regarding ongoing goc  - serial CBC     NSTEMI  - not a candidate for anticoagulation due to recent gastrointestinal hemorrhage and thus not a candidate for further workup like cardiac cath    Bacteremia  - unclear etiology  - repeat cultures negative  - pending CT scans  - per ID, plan for Zosyn till 2/12/23 followed by surveillance blood cultures     Nausea  - unclear etiology  - intermittent despite zofran  - reglan ordered and discussed with ICU pharmacist   - recommend IV compazine if refractory symptoms    Palliative Care Encounter  - DNR/DNI MOLST  - HCP is jyothi [place]ayoa (out of country) and alternate jyothi Martinez  - GOC at present is no escalation of care including no IV vasopressor, no IV transfusion, no invasive intervention or testing that involves contrast or anesthesia. Son is okay with all other conservative medical mgnt. He was made aware if clinical deterioration despite active treatment, we would need further GOC including possible transition to a comfort measure approach.   - Overall prognosis is guarded at best  - Palliative team to follow for support and clinical progress for ongoing goc

## 2023-02-03 NOTE — CONSULT NOTE ADULT - ASSESSMENT
88y old  Female PMH TAVR, CAD , HTN, and DM who presented to the ED with complaints of shortness of breath and several weeks of bloody bowel movements. Chest xray shows LLL PNA. Underwent an EGD with cautery and endoclip for a duodenal ulcer on 1/29. Course complicated by new onset Afib with RVR and NSTEMI, persistent nausea. Has made trops which have peaked.  EKG from today with no ischemia. Has had episodes of intermittent "chest pressure" which comes on with her nausea symptoms and is describes as "burning". Had in depth discussion with Juan and patient at bedside.  Discussed further non invasive testing and stating if tests are abnormal the next step would be to pursue invasive testing.  At this time Juan and the patient told me they are unwilling to agree to such testing, use of contrast, anti platelet medications, or anesthesia / cardiac sedation. There main focus was to stop the persistent nausea and make her "feel better". "I want her comfortable"

## 2023-02-04 NOTE — PROGRESS NOTE ADULT - ASSESSMENT
88y  Female with h/o TAVR, CAD, on ASA/plavix, HTN, DM, CKD. Patient was admitted on 1/28 with complaints of shortness of breath x 1 day and bloody bowel movements for the past several weeks. Patient was found to have H/H 6.7/22, Hypotensive. CXR suggestive of LLL pneumonia. Patient did not have evidence of active bleeding since admission to MICU. Started on IV vasopressor due to hypotension. Blood cultures are growing gram negative rods and gram positive cocci in pairs. 1/29 EGD remarkable for Duodenal ulcers s/p endoclip x 2. Patient continued to have multiple episodes of melena/hematochezia and taken to IR for coil embolization right gastroepiploic, SPDA, and GDA proper 1/30. Patient has been on Vancomycin and Zosyn since admission.        Klebsiella pneumoniae Bacteremia/Sepsis  Enterococcus faecalis  Bacteremia/sepsis  GI Bleed  Acute blood loss anemia   Leukocytosis   h/o TAVR       - Blood cultures 1/28 reporting Klebsiella pneumoniae, Enterococcus faecalis and 1 of 4 bottles with CoNS  - Repeat blood cultures 1/30 no growth   - CoNS likely contamination   - RVP/COVID 19 PCR 1/28 and 1/31 negative   - Check CT A/P if possible, d/w Dr Dc since the Son does not want IV contrast this would be have to be without contrast or possibly with just oral contrast   - TTE with no veg  - Given 1 of 4 bottles of Klebsiella pneumoniae, Enterococcus and rapid clearance of bacteremia, unlikely endocarditis although unable to rule this out completely.   - Per palliative care notes, no further escalation of care, ? if this includes CT A/P and Zosyn till 2/12/23 and surveillance blood cultures   - Procalcitonin level 0.46  - Continue Zosyn  - Plan for Zosyn till 2/12/23 followed by surveillance blood cultures   - Follow up cultures  - Trend Fever  - Trend WBC      Will Follow    d/w clinical pharmacy and Dr Dc

## 2023-02-04 NOTE — PROGRESS NOTE ADULT - ASSESSMENT
Patient is a 88 year old female with history of AS s/p TAVR, CAD, DM, CKD who presented with GI bleed due to a duodonal ulcer and underwent EGD along with IR embolization. Patient requiring several units of blood products and Hb stabilize. Hospital course complicated by shock, new onset Afib, NSTEMI and polymicrobial bacteremia. Patient weaned off pressors and downgraded to medicine for further management.     #Gastrointestinal Hemorrhage due to Duodenal Ulcer   #acute blood loss anemia  - Hb stabilized s/p multiple units of PRBCs, 1 unit of platelets and FFP  - s/p EGD with cautery and endoclip on 1/29  - s/p IR coil embolization on 1/30  - continue protonix 40 mg IV BID  - continue venofer  - continue to hold DAPT  - no further melena reported tonight  - H. Pylori Ag negative  - patient has declined any further transfusions if indicated  - GI recs reviewed; no further intervention  - Palliative care recs appreciated  - repeat H/H later today as hgb lower this morning - HR normal and no dark stool however    #Shock possibly due to Hypovolemia vs Sepsis vs Cardiogenic  - BP stabilized off pressors  - continue midodrine and wean as tolerated  - right IJ to be removed   - TTE reviewed; preserved EF and no WMA  - no further vasopressors per GOC discussion on 2/2    #NSTEMI   - history of CAD with intermittent chest pressure   - continue to hold DAPT as above  - TNI peaked at 0.7 and downtrending to 0.6  - cardiology not consulted since patient does not want any further aggressive interventions     #New Onset Atrial Fibrillation  - not a candidate for AC  - will add beta-blocker if rate is uncontrolled and BP permits    #Polymicrobial Bacteremia possibly in the setting of Aspiration PNA  vs UTI ?  - blood cultures positive for Klebsiella and Enterococcus; also with CoNS which was likely a contaminant  - repeat blood cultures negative  - CXR reviewed with RLL infiltrate and Left basilar effusion  - continue empiric zosyn - until 2/18  - MBS ordered  - continue mucinex and mucomyst as ordered by MICU team  - CT chest/abd/pelv - pending read  - LFT normal  - UA positive, but no UCx obtained. Low yield at this time since patient is already on antibiotic.  - TTE negative for vegetation; LISSA deferred since patient does not want any further aggressive measures  - ID on board; f/u further recs pending CT scans    #Acute on Chronic HFpEF  #Acute hypoxemic resp failure from heart failure exac - on IV lasix - less congestion today 2/4 she reports  - grossly volume overloaded  - IV lasix daily 40mg for now  - If BP remains stable, will increase diuretics   - strict I/os, daily weights  - TTE as above    #Anxiety/Depression  - continue zoloft and trazadone    #Hypothyroidism  -continue synthroid    #CKD Stage 3/4  - baseline creatinine 2.6 in Sept 2022  - creatinine essentially at baseline  - urine studies reviewed  - avoid nephrotoxic agents and renally dose medications  - monitor I/Os, daily weights  - nephrology recs reviewed; signed off  - follows with Dr. Norman on outpatient    #DM  - Hba1c 6.0; sugars uncontrolled  - continue lantus and ISS at current doses  - change to ADA diet    DVT ppx - SCDs

## 2023-02-04 NOTE — DISCHARGE NOTE FOR THE EXPIRED PATIENT - HOSPITAL COURSE
Patient is a 88 year old female with history of AS s/p TAVR, CAD, DM, CKD who presented with GI bleed due to a duodonal ulcer and underwent EGD along with IR embolization. Patient requiring several units of blood products and Hb stabilize. Hospital course complicated by shock, new onset Afib, NSTEMI and polymicrobial bacteremia. Course further complicated by iatrogenic fluid overload for which patient was started on aggressive IV diuresis, which had to be reduced given hypotension. Palliative care was consulted. Patient and family decided against any further transfusions, vasopressors or procedures. Patient weaned off pressors started on PO midodrine and downgraded to medicine for further management. Pt Hgb stabilized s/p multiple units of PRBCs, platelets and FFP. Despite patient stabilization, patient overall prognosis poor. Pt  2023.  Patient is a 88 year old female with history of AS s/p TAVR, CAD, DM, CKD who presented with GI bleed due to a duodonal ulcer and underwent EGD along with IR embolization. Patient requiring several units of blood products and Hb stabilize. Hospital course complicated by shock, new onset Afib, NSTEMI and polymicrobial bacteremia. Course further complicated by iatrogenic fluid overload for which patient was started on aggressive IV diuresis, which had to be reduced given hypotension. Palliative care was consulted. Patient and family decided against any further transfusions, vasopressors or procedures. Patient DNR/DNI. Patient weaned off pressors started on PO midodrine and downgraded to medicine for further management. Pt Hgb stabilized s/p multiple units of PRBCs, platelets and FFP. Despite patient stabilization, patient overall prognosis poor. Pt  2023.

## 2023-02-04 NOTE — PROGRESS NOTE ADULT - PROVIDER SPECIALTY LIST ADULT
Critical Care
Infectious Disease
Nephrology
Palliative Care
Gastroenterology
Infectious Disease
Infectious Disease
MICU
MICU
Nephrology
Nephrology
Palliative Care
Critical Care
Hospitalist
Infectious Disease
Gastroenterology
Gastroenterology

## 2023-02-04 NOTE — PROGRESS NOTE ADULT - SUBJECTIVE AND OBJECTIVE BOX
[facility]  INFECTIOUS DISEASES at [employer]  =======================================================                               Luis Villarreal MD#   Melba Marquis MD*                             Niki Carroll MD*   Kiana Kam MD*            Diplomates American Board of Internal Medicine & Infectious Diseases                # [place] Office - Appt - Tel  [phone] Fax [phone]                * [place] Office - Appt - Tel [phone] Fax [phone]                                  Hospital Consult line:  [phone]  =======================================================    TATE CHEN [id number]    Follow up: Bacteremia    Off pressors  Awake  c/o nausea       Allergies:  No Known Allergies       REVIEW OF SYSTEMS:  CONSTITUTIONAL:  No Fever or chills  HEENT:  No diplopia or blurred vision.  No earache, sore throat or runny nose.  CARDIOVASCULAR:  No chest pain  RESPIRATORY:  No cough, shortness of breath  GASTROINTESTINAL:  + nausea. No vomiting or diarrhea.  GENITOURINARY:  [place]   MUSCULOSKELETAL:  no joint aches, no muscle pain  SKIN:  No change in skin, hair or nails.  NEUROLOGIC:  No Headache  PSYCHIATRIC:  No disorder of thought or mood.  ENDOCRINE:  No heat or cold intolerance  HEMATOLOGICAL:  No easy bruising or bleeding.       Physical Exam:  GEN: lethargic   HEENT: normocephalic and atraumatic. EOMI. PERRL.    NECK: Supple.   LUNGS: Decreased basal BS B/L   HEART: RRR  ABDOMEN: Soft, NT, ND.  +BS.    : [place]   EXTREMITIES: trace edema.  MSK: No joint swelling  NEUROLOGIC: Awake alert answering questions   PSYCHIATRIC: Appropriate affect   SKIN: No rash      Vitals:  T(F): 97.5 (04 Feb 2023 05:00), Max: 97.6 (03 Feb 2023 21:27)  HR: 84 (04 Feb 2023 05:00)  BP: 149/88 (04 Feb 2023 05:00)  RR: 18 (04 Feb 2023 05:00)  SpO2: 94% (04 Feb 2023 05:00) (92% - 99%)  temp max in last 48H T(F): , Max: 97.9 (02-02-23 @ 12:00)    Current Antibiotics:  piperacillin/tazobactam IVPB.. 3.375 Gram(s) IV Intermittent every 12 hours    Other medications:  albuterol/ipratropium for Nebulization 3 milliLiter(s) Nebulizer every 6 hours  dextrose 50% Injectable 25 Gram(s) IV Push once  dextrose 50% Injectable 12.5 Gram(s) IV Push once  dextrose 50% Injectable 25 Gram(s) IV Push once  furosemide   Injectable 40 milliGRAM(s) IV Push every 24 hours  glucagon  Injectable 1 milliGRAM(s) IntraMuscular once  influenza  Vaccine (HIGH DOSE) 0.7 milliLiter(s) IntraMuscular once  insulin glargine Injectable (LANTUS) 15 Unit(s) SubCutaneous at bedtime  insulin lispro (ADMELOG) corrective regimen sliding scale   SubCutaneous every 6 hours  iron sucrose IVPB 100 milliGRAM(s) IV Intermittent every 24 hours  levothyroxine 125 MICROGram(s) Oral at bedtime  metoprolol tartrate 12.5 milliGRAM(s) Oral two times a day  midodrine      midodrine 10 milliGRAM(s) Oral three times a day  nystatin Powder 1 Application(s) Topical two times a day  pantoprazole  Injectable 40 milliGRAM(s) IV Push two times a day  prochlorperazine   Injectable 10 milliGRAM(s) IntraMuscular every 8 hours  sertraline 50 milliGRAM(s) Oral daily  traZODone 50 milliGRAM(s) Oral at bedtime                            8.4    6.30  )-----------( 165      ( 03 Feb 2023 02:55 )             25.5     02-03    146<H>  |  111<H>  |  59.7<H>  ----------------------------<  143<H>  3.6   |  25.0  |  2.68<H>    Ca    8.4      03 Feb 2023 02:55  Phos  4.1     02-03  Mg     2.4     02-03    TPro  4.8<L>  /  Alb  2.8<L>  /  TBili  0.2<L>  /  DBili  x   /  AST  18  /  ALT  17  /  AlkPhos  56  02-03    RECENT CULTURES:  02-01 @ 17:20    RVP  NotDetec    01-31 @ 08:35 .Blood Blood-Peripheral     No growth to date.    01-31 @ 08:15 .Blood Blood-Peripheral     No growth to date.    01-30 @ 08:45 .Blood Blood     No growth to date.    01-28 @ 11:15 .Blood Blood-Peripheral Blood Culture PCR  Klebsiella pneumoniae  Enterococcus faecalis    Growth in aerobic and anaerobic bottles: Klebsiella pneumoniae  Growth in aerobic and anaerobic bottles: Enterococcus faecalis  Growth in aerobic bottle: Coag Negative Staphylococcus Unable to Identify  further  Coag Negative Staphylococcus  Singleset isolate, possible contaminant. Contact  Microbiology if susceptibility testing clinically  indicated.  ***Blood Panel PCR results on this specimen are available  approximately 3 hours after the Gram stain result.***  Gram stain, PCR, and/or culture results may not always  correspond due to difference in methodologies.  ************************************************************  This PCR assay was performed by multiplex PCR. This  Assay tests for 66 bacterial and resistance gene targets.  Please refer to the [facility] test directory  at [email or web address] for details.  Growth in aerobic bottle: Gram Negative Rods and Gram positive cocci in  pairs  Growth in anaerobic bottle: Gram Negative Rods and Gram positive cocci in  pairs    01-28 @ 10:08 .Blood Blood-Peripheral     No Growth Final      WBC Count: 6.30 K/uL (02-03-23 @ 02:55)  WBC Count: 6.85 K/uL (02-02-23 @ 13:00)  WBC Count: 6.54 K/uL (02-02-23 @ 03:38)  WBC Count: 7.61 K/uL (02-01-23 @ 22:25)  WBC Count: 7.71 K/uL (02-01-23 @ 13:52)  WBC Count: 12.35 K/uL (02-01-23 @ 05:00)  WBC Count: 8.53 K/uL (02-01-23 @ 00:04)  WBC Count: 13.66 K/uL (01-31-23 @ 04:00)  WBC Count: 13.23 K/uL (01-30-23 @ 21:29)  WBC Count: 9.25 K/uL (01-30-23 @ 16:56)    Creatinine, Serum: 2.68 mg/dL (02-03-23 @ 02:55)  Creatinine, Serum: 2.62 mg/dL (02-02-23 @ 03:38)  Creatinine, Serum: 2.63 mg/dL (02-01-23 @ 13:52)  Creatinine, Serum: 2.53 mg/dL (02-01-23 @ 05:00)  Creatinine, Serum: 2.52 mg/dL (02-01-23 @ 00:04)  Creatinine, Serum: 2.33 mg/dL (01-31-23 @ 04:00)    Procalcitonin, Serum: 0.46 ng/mL (01-29-23 @ 04:11)  Procalcitonin, Serum: 0.37 ng/mL (01-28-23 @ 21:28)     SARS-CoV-2: NotDetec (02-01-23 @ 17:20)  SARS-CoV-2: NotDetec (01-28-23 @ 10:08)        < from: Xray Chest 1 View- PORTABLE-Urgent (Xray Chest 1 View- PORTABLE-Urgent .) (01.31.23 @ 09:22) >    ACC: [id number] EXAM:  XR CHEST PORTABLE URGENT 1V   ORDERED BY: KOURTNEY CLIFFORD     PROCEDURE DATE:  01/31/2023      INTERPRETATION:  AP chest on January 31, 2023 at 9:11 AM. Patient has a   GI bleed and is short of breath.    Heart magnified by technique. However aortic valve again noted.    There is an increasing left base effusion now mild and is significant   increase right base infiltrate with pleural component.    Incidental azygous lobe.    IMPRESSION: Increasing bilateral lung and pleural findings.    --- End of Report ---  < end of copied text >      < from: TTE Echo Complete w/o Contrast w/ Doppler (01.31.23 @ 13:36) >  Summary:   1. Technically difficult study.   2. Left ventricular ejection fraction, by visual estimation, is 55 to 60%.   3. Spectral Doppler shows impaired relaxation pattern of left   ventricular myocardial filling (Grade I diastolic dysfunction).   4. There is mild concentric left ventricular hypertrophy.   5. Mild mitral valve regurgitation.   6. Moderate mitralannular calcification.   7. Thickening and calcification of the anterior and posterior mitral   valve leaflets.   8. Mild tricuspid regurgitation.   9. TAVR in the aortic position.  10. Peak aortic valve gradient is 9.9 mmHg and the mean gradient is 5.5   mmHg, which is probably normal in the setting of a prosthetic aortic valve.  11. Estimated pulmonary artery systolic pressure is 47.4 mmHg assuming a   right atrial pressure of 8 mmHg, which is consistent with mild pulmonary   hypertension.  12. Suboptimal study for evaluation of endocarditis, consider LISSA if   clinically indicated.    < end of copied text >

## 2023-02-04 NOTE — PROGRESS NOTE ADULT - SUBJECTIVE AND OBJECTIVE BOX
CC: GIB (03 Feb 2023 01:59)    INTERVAL HPI/OVERNIGHT EVENTS:  no acute events overnight  nauseous today    Vital Signs Last 24 Hrs  T(C): 36.4 (04 Feb 2023 10:17), Max: 36.4 (03 Feb 2023 21:27)  T(F): 97.5 (04 Feb 2023 10:17), Max: 97.6 (03 Feb 2023 21:27)  HR: 91 (04 Feb 2023 10:17) (76 - 91)  BP: 104/54 (04 Feb 2023 10:17) (101/71 - 149/88)  BP(mean): 79 (03 Feb 2023 21:27) (79 - 82)  RR: 18 (04 Feb 2023 10:17) (16 - 18)  SpO2: 93% (04 Feb 2023 10:17) (92% - 98%)    Parameters below as of 04 Feb 2023 10:17  Patient On (Oxygen Delivery Method): nasal cannula    PHYSICAL EXAM:  General: in no acute distress  Eyes: PERRLA, EOMI; conjunctiva and sclera clear  Head: Normocephalic; atraumatic  ENMT: No nasal discharge; airway clear  Neck: Supple; non tender; no masses  Respiratory: No wheezes, rales or rhonchi  Cardiovascular: Regular rate and rhythm. S1 and S2 Normal; No murmurs, gallops or rubs  Gastrointestinal: Soft non-tender non-distended; Normal bowel sounds  Genitourinary: No costovertebral angle tenderness  Extremities: Normal range of motion, No clubbing, cyanosis or edema  Vascular: Peripheral pulses palpable 2+ bilaterally  Neurological: Alert and oriented x4  Skin: Warm and dry. No acute rash  Lymph Nodes: No acute cervical adenopathy  Musculoskeletal: Normal gait, tone, without deformities  Psychiatric: Cooperative and appropriate    I&O's Detail    03 Feb 2023 07:01  -  04 Feb 2023 07:00  --------------------------------------------------------  IN:    IV PiggyBack: 75 mL    IV PiggyBack: 100 mL    Oral Fluid: 225 mL  Total IN: 400 mL    OUT:    Indwelling Catheter - Urethral (mL): 390 mL  Total OUT: 390 mL    Total NET: 10 mL      04 Feb 2023 07:01  -  04 Feb 2023 12:22  --------------------------------------------------------  IN:    IV PiggyBack: 25 mL  Total IN: 25 mL    OUT:  Total OUT: 0 mL    Total NET: 25 mL    CARDIAC MARKERS ( 04 Feb 2023 08:51 )  x     / 0.55 ng/mL / x     / x     / x      CARDIAC MARKERS ( 03 Feb 2023 21:45 )  x     / 0.56 ng/mL / x     / x     / x      CARDIAC MARKERS ( 03 Feb 2023 15:45 )  x     / 0.59 ng/mL / x     / x     / x                   7.5    10.14 )-----------( 251      ( 04 Feb 2023 08:51 )             23.3     04 Feb 2023 08:51    148    |  106    |  60.0   ----------------------------<  141    3.4     |  25.0   |  2.93     Ca    9.1        04 Feb 2023 08:51  Phos  4.1       03 Feb 2023 02:55  Mg     2.5       04 Feb 2023 08:51    TPro  5.2    /  Alb  3.0    /  TBili  0.2    /  DBili  x      /  AST  20     /  ALT  17     /  AlkPhos  50     04 Feb 2023 08:51    CAPILLARY BLOOD GLUCOSE  POCT Blood Glucose.: 118 mg/dL (04 Feb 2023 12:08)  POCT Blood Glucose.: 130 mg/dL (04 Feb 2023 05:08)  POCT Blood Glucose.: 179 mg/dL (03 Feb 2023 23:28)  POCT Blood Glucose.: 188 mg/dL (03 Feb 2023 21:11)  POCT Blood Glucose.: 183 mg/dL (03 Feb 2023 17:07)    LIVER FUNCTIONS - ( 04 Feb 2023 08:51 )  Alb: 3.0 g/dL / Pro: 5.2 g/dL / ALK PHOS: 50 U/L / ALT: 17 U/L / AST: 20 U/L / GGT: x           MEDICATIONS  (STANDING):  albuterol/ipratropium for Nebulization 3 milliLiter(s) Nebulizer every 6 hours  dextrose 50% Injectable 25 Gram(s) IV Push once  dextrose 50% Injectable 12.5 Gram(s) IV Push once  dextrose 50% Injectable 25 Gram(s) IV Push once  furosemide   Injectable 40 milliGRAM(s) IV Push every 24 hours  glucagon  Injectable 1 milliGRAM(s) IntraMuscular once  influenza  Vaccine (HIGH DOSE) 0.7 milliLiter(s) IntraMuscular once  insulin glargine Injectable (LANTUS) 15 Unit(s) SubCutaneous at bedtime  insulin lispro (ADMELOG) corrective regimen sliding scale   SubCutaneous every 6 hours  iron sucrose IVPB 100 milliGRAM(s) IV Intermittent every 24 hours  levothyroxine 125 MICROGram(s) Oral at bedtime  metoclopramide Injectable 10 milliGRAM(s) IV Push three times a day  metoprolol tartrate 12.5 milliGRAM(s) Oral two times a day  midodrine      midodrine 10 milliGRAM(s) Oral three times a day  nystatin Powder 1 Application(s) Topical two times a day  pantoprazole  Injectable 40 milliGRAM(s) IV Push two times a day  piperacillin/tazobactam IVPB.. 3.375 Gram(s) IV Intermittent every 12 hours  sertraline 50 milliGRAM(s) Oral daily  traZODone 50 milliGRAM(s) Oral at bedtime    MEDICATIONS  (PRN):  acetylcysteine 20%  Inhalation 4 milliLiter(s) Inhalation two times a day PRN chest congestion  benzocaine/menthol Lozenge 1 Lozenge Oral every 4 hours PRN Sore Throat  benzonatate 100 milliGRAM(s) Oral three times a day PRN Cough  bismuth subsalicylate Liquid 5 milliLiter(s) Oral every 8 hours PRN dyspepsia  guaiFENesin  milliGRAM(s) Oral every 12 hours PRN Cough  melatonin 5 milliGRAM(s) Oral at bedtime PRN Sleep  morphine  - Injectable 1 milliGRAM(s) IV Push every 4 hours PRN pain  ondansetron Injectable 4 milliGRAM(s) IV Push every 6 hours PRN Nausea and/or Vomiting  sodium chloride 0.9% lock flush 10 milliLiter(s) IV Push every 1 hour PRN Pre/post blood products, medications, blood draw, and to maintain line patency      RADIOLOGY & ADDITIONAL TESTS:

## 2023-02-05 LAB
ALBUMIN, RANDOM URINE: 5.3 MG/DL — SIGNIFICANT CHANGE UP
COLLECT DURATION TIME UR: 24 HR — SIGNIFICANT CHANGE UP
CULTURE RESULTS: SIGNIFICANT CHANGE UP
CULTURE RESULTS: SIGNIFICANT CHANGE UP
MICROALBUMIN 24H UR-MRATE: 26.5 MG/24HR — SIGNIFICANT CHANGE UP (ref 0–29.9)
MICROALBUMIN ?TM UR-MRATE: 18.4 UG/MIN — SIGNIFICANT CHANGE UP (ref 0–19.9)
SPECIMEN SOURCE: SIGNIFICANT CHANGE UP
SPECIMEN SOURCE: SIGNIFICANT CHANGE UP
TOTAL VOLUME - 24 HOUR: 500 ML — SIGNIFICANT CHANGE UP
URINE CREATININE CALCULATION: 0.3 G/24 H — LOW (ref 0.8–1.8)

## 2023-02-16 NOTE — CHART NOTE - NSCHARTNOTEFT_GEN_A_CORE
Palliative care social work note.    Bereavement call made to patients son Juan. Support and resources offered.

## 2023-02-16 NOTE — CHART NOTE - NSCHARTNOTESELECT_GEN_ALL_CORE
Event Note
Medical Necessity  note/Event Note
hospitalist/Event Note

## 2023-03-03 ENCOUNTER — APPOINTMENT (OUTPATIENT)
Dept: ENDOCRINOLOGY | Facility: CLINIC | Age: 88
End: 2023-03-03

## (undated) DEVICE — CONTAINER FORMALIN 80ML YELLOW

## (undated) DEVICE — SALIVA EJECTOR (BLUE)

## (undated) DEVICE — TUBING MEDI-VAC W MAXIGRIP CONNECTORS 1/4"X6'

## (undated) DEVICE — GOWN LG

## (undated) DEVICE — DRSG BANDAID 0.75X3"

## (undated) DEVICE — PACK IV START WITH CHG

## (undated) DEVICE — BIOPSY FORCEP COLD DISP

## (undated) DEVICE — BIOPSY FORCEP RADIAL JAW 4 STANDARD WITH NEEDLE

## (undated) DEVICE — BASIN EMESIS 10IN GRADUATED MAUVE

## (undated) DEVICE — BITE BLOCK ADULT 20 X 27MM (GREEN)

## (undated) DEVICE — DRSG CURITY GAUZE SPONGE 4 X 4" 12-PLY NON-STERILE

## (undated) DEVICE — DENTURE CUP PINK

## (undated) DEVICE — LUBRICATING JELLY HR ONE SHOT 3G

## (undated) DEVICE — UNDERPAD LINEN SAVER 17 X 24"

## (undated) DEVICE — DRSG 2X2

## (undated) DEVICE — CLAMP BX HOT RAD JAW 3

## (undated) DEVICE — LINE BREATHE SAMPLNG

## (undated) DEVICE — ELCTR ECG CONDUCTIVE ADHESIVE

## (undated) DEVICE — CATH IV SAFE BC 22G X 1" (BLUE)

## (undated) DEVICE — TUBING IV SET GRAVITY 3Y 100" MACRO